# Patient Record
Sex: FEMALE | Race: WHITE | Employment: UNEMPLOYED | URBAN - METROPOLITAN AREA
[De-identification: names, ages, dates, MRNs, and addresses within clinical notes are randomized per-mention and may not be internally consistent; named-entity substitution may affect disease eponyms.]

---

## 2022-05-08 ENCOUNTER — APPOINTMENT (OUTPATIENT)
Dept: GENERAL RADIOLOGY | Age: 57
DRG: 363 | End: 2022-05-08
Payer: MEDICARE

## 2022-05-08 ENCOUNTER — HOSPITAL ENCOUNTER (INPATIENT)
Age: 57
LOS: 10 days | Discharge: HOME | DRG: 363 | End: 2022-05-18
Attending: STUDENT IN AN ORGANIZED HEALTH CARE EDUCATION/TRAINING PROGRAM | Admitting: STUDENT IN AN ORGANIZED HEALTH CARE EDUCATION/TRAINING PROGRAM
Payer: MEDICARE

## 2022-05-08 ENCOUNTER — APPOINTMENT (OUTPATIENT)
Dept: CT IMAGING | Age: 57
DRG: 363 | End: 2022-05-08
Payer: MEDICARE

## 2022-05-08 DIAGNOSIS — I50.9 NEW ONSET OF CONGESTIVE HEART FAILURE (HCC): Primary | ICD-10-CM

## 2022-05-08 DIAGNOSIS — J96.01 ACUTE RESPIRATORY FAILURE WITH HYPOXIA (HCC): ICD-10-CM

## 2022-05-08 DIAGNOSIS — L03.90 CELLULITIS, UNSPECIFIED CELLULITIS SITE: ICD-10-CM

## 2022-05-08 DIAGNOSIS — N63.10 BREAST MASS, RIGHT: ICD-10-CM

## 2022-05-08 PROBLEM — C80.1 MALIGNANCY (HCC): Status: ACTIVE | Noted: 2022-05-08

## 2022-05-08 PROBLEM — R09.89 SUSPECTED CHF (CONGESTIVE HEART FAILURE): Status: ACTIVE | Noted: 2022-05-08

## 2022-05-08 PROBLEM — M79.89 LEG SWELLING: Status: ACTIVE | Noted: 2022-05-08

## 2022-05-08 LAB
A/G RATIO: 1 (ref 1.1–2.2)
ALBUMIN SERPL-MCNC: 3.4 G/DL (ref 3.4–5)
ALP BLD-CCNC: 93 U/L (ref 40–129)
ALT SERPL-CCNC: 6 U/L (ref 10–40)
ANION GAP SERPL CALCULATED.3IONS-SCNC: 11 MMOL/L (ref 3–16)
APTT: 27.1 SEC (ref 26.2–38.6)
AST SERPL-CCNC: 11 U/L (ref 15–37)
BACTERIA: NORMAL /HPF
BASOPHILS ABSOLUTE: 0.1 K/UL (ref 0–0.2)
BASOPHILS RELATIVE PERCENT: 0.7 %
BILIRUB SERPL-MCNC: 1.1 MG/DL (ref 0–1)
BILIRUBIN URINE: NEGATIVE
BLOOD, URINE: NEGATIVE
BUN BLDV-MCNC: 7 MG/DL (ref 7–20)
CALCIUM SERPL-MCNC: 9.2 MG/DL (ref 8.3–10.6)
CHLORIDE BLD-SCNC: 96 MMOL/L (ref 99–110)
CLARITY: ABNORMAL
CO2: 30 MMOL/L (ref 21–32)
COLOR: YELLOW
CREAT SERPL-MCNC: 0.8 MG/DL (ref 0.6–1.1)
EOSINOPHILS ABSOLUTE: 0.1 K/UL (ref 0–0.6)
EOSINOPHILS RELATIVE PERCENT: 0.9 %
EPITHELIAL CELLS, UA: 3 /HPF (ref 0–5)
FERRITIN: 17.2 NG/ML (ref 15–150)
GFR AFRICAN AMERICAN: >60
GFR NON-AFRICAN AMERICAN: >60
GLUCOSE BLD-MCNC: 168 MG/DL (ref 70–99)
GLUCOSE BLD-MCNC: 174 MG/DL (ref 70–99)
GLUCOSE URINE: NEGATIVE MG/DL
HCT VFR BLD CALC: 41 % (ref 36–48)
HCT VFR BLD CALC: 41.3 % (ref 36–48)
HEMOGLOBIN: 12.7 G/DL (ref 12–16)
HYALINE CASTS: 1 /LPF (ref 0–8)
IMMATURE RETIC FRACT: 0.58 (ref 0.21–0.37)
INR BLD: 1.41 (ref 0.88–1.12)
IRON SATURATION: 5 % (ref 15–50)
IRON: 20 UG/DL (ref 37–145)
KETONES, URINE: NEGATIVE MG/DL
LACTIC ACID: 1 MMOL/L (ref 0.4–2)
LACTIC ACID: 1.5 MMOL/L (ref 0.4–2)
LEUKOCYTE ESTERASE, URINE: NEGATIVE
LYMPHOCYTES ABSOLUTE: 0.9 K/UL (ref 1–5.1)
LYMPHOCYTES RELATIVE PERCENT: 11.3 %
MCH RBC QN AUTO: 22.9 PG (ref 26–34)
MCHC RBC AUTO-ENTMCNC: 31.1 G/DL (ref 31–36)
MCV RBC AUTO: 73.7 FL (ref 80–100)
MICROSCOPIC EXAMINATION: YES
MONOCYTES ABSOLUTE: 0.9 K/UL (ref 0–1.3)
MONOCYTES RELATIVE PERCENT: 10.8 %
NEUTROPHILS ABSOLUTE: 6.1 K/UL (ref 1.7–7.7)
NEUTROPHILS RELATIVE PERCENT: 76.3 %
NITRITE, URINE: NEGATIVE
PDW BLD-RTO: 18.8 % (ref 12.4–15.4)
PERFORMED ON: ABNORMAL
PH UA: 5.5 (ref 5–8)
PLATELET # BLD: 300 K/UL (ref 135–450)
PMV BLD AUTO: 7.1 FL (ref 5–10.5)
POTASSIUM REFLEX MAGNESIUM: 3.9 MMOL/L (ref 3.5–5.1)
PRO-BNP: 3604 PG/ML (ref 0–124)
PROTEIN UA: NEGATIVE MG/DL
PROTHROMBIN TIME: 16.2 SEC (ref 9.9–12.7)
RBC # BLD: 5.56 M/UL (ref 4–5.2)
RBC UA: 1 /HPF (ref 0–4)
RETICULOCYTE ABSOLUTE COUNT: 0.18 M/UL (ref 0.02–0.1)
RETICULOCYTE COUNT PCT: 3.26 % (ref 0.5–2.18)
SODIUM BLD-SCNC: 137 MMOL/L (ref 136–145)
SPECIFIC GRAVITY UA: 1 (ref 1–1.03)
TOTAL IRON BINDING CAPACITY: 379 UG/DL (ref 260–445)
TOTAL PROTEIN: 6.9 G/DL (ref 6.4–8.2)
TROPONIN: 0.01 NG/ML
TROPONIN: 0.01 NG/ML
URINE REFLEX TO CULTURE: ABNORMAL
URINE TYPE: ABNORMAL
UROBILINOGEN, URINE: 1 E.U./DL
WBC # BLD: 8 K/UL (ref 4–11)
WBC UA: 1 /HPF (ref 0–5)

## 2022-05-08 PROCEDURE — 85045 AUTOMATED RETICULOCYTE COUNT: CPT

## 2022-05-08 PROCEDURE — 99285 EMERGENCY DEPT VISIT HI MDM: CPT

## 2022-05-08 PROCEDURE — 81001 URINALYSIS AUTO W/SCOPE: CPT

## 2022-05-08 PROCEDURE — 1200000000 HC SEMI PRIVATE

## 2022-05-08 PROCEDURE — 6360000002 HC RX W HCPCS: Performed by: STUDENT IN AN ORGANIZED HEALTH CARE EDUCATION/TRAINING PROGRAM

## 2022-05-08 PROCEDURE — 36415 COLL VENOUS BLD VENIPUNCTURE: CPT

## 2022-05-08 PROCEDURE — 6360000004 HC RX CONTRAST MEDICATION: Performed by: PHYSICIAN ASSISTANT

## 2022-05-08 PROCEDURE — 6360000002 HC RX W HCPCS: Performed by: PHYSICIAN ASSISTANT

## 2022-05-08 PROCEDURE — 83880 ASSAY OF NATRIURETIC PEPTIDE: CPT

## 2022-05-08 PROCEDURE — 2580000003 HC RX 258: Performed by: PHYSICIAN ASSISTANT

## 2022-05-08 PROCEDURE — 83615 LACTATE (LD) (LDH) ENZYME: CPT

## 2022-05-08 PROCEDURE — 96375 TX/PRO/DX INJ NEW DRUG ADDON: CPT

## 2022-05-08 PROCEDURE — 82803 BLOOD GASES ANY COMBINATION: CPT

## 2022-05-08 PROCEDURE — 2500000003 HC RX 250 WO HCPCS: Performed by: STUDENT IN AN ORGANIZED HEALTH CARE EDUCATION/TRAINING PROGRAM

## 2022-05-08 PROCEDURE — 85025 COMPLETE CBC W/AUTO DIFF WBC: CPT

## 2022-05-08 PROCEDURE — 71045 X-RAY EXAM CHEST 1 VIEW: CPT

## 2022-05-08 PROCEDURE — 85730 THROMBOPLASTIN TIME PARTIAL: CPT

## 2022-05-08 PROCEDURE — 83540 ASSAY OF IRON: CPT

## 2022-05-08 PROCEDURE — 93005 ELECTROCARDIOGRAM TRACING: CPT | Performed by: PHYSICIAN ASSISTANT

## 2022-05-08 PROCEDURE — 2700000000 HC OXYGEN THERAPY PER DAY

## 2022-05-08 PROCEDURE — 94761 N-INVAS EAR/PLS OXIMETRY MLT: CPT

## 2022-05-08 PROCEDURE — 82728 ASSAY OF FERRITIN: CPT

## 2022-05-08 PROCEDURE — 36600 WITHDRAWAL OF ARTERIAL BLOOD: CPT

## 2022-05-08 PROCEDURE — 83605 ASSAY OF LACTIC ACID: CPT

## 2022-05-08 PROCEDURE — 83550 IRON BINDING TEST: CPT

## 2022-05-08 PROCEDURE — 6370000000 HC RX 637 (ALT 250 FOR IP): Performed by: PHYSICIAN ASSISTANT

## 2022-05-08 PROCEDURE — 2580000003 HC RX 258: Performed by: STUDENT IN AN ORGANIZED HEALTH CARE EDUCATION/TRAINING PROGRAM

## 2022-05-08 PROCEDURE — 71260 CT THORAX DX C+: CPT

## 2022-05-08 PROCEDURE — 6360000002 HC RX W HCPCS: Performed by: NURSE PRACTITIONER

## 2022-05-08 PROCEDURE — 80053 COMPREHEN METABOLIC PANEL: CPT

## 2022-05-08 PROCEDURE — 96374 THER/PROPH/DIAG INJ IV PUSH: CPT

## 2022-05-08 PROCEDURE — 85610 PROTHROMBIN TIME: CPT

## 2022-05-08 PROCEDURE — 84484 ASSAY OF TROPONIN QUANT: CPT

## 2022-05-08 PROCEDURE — 87040 BLOOD CULTURE FOR BACTERIA: CPT

## 2022-05-08 PROCEDURE — 2060000000 HC ICU INTERMEDIATE R&B

## 2022-05-08 RX ORDER — SODIUM CHLORIDE 0.9 % (FLUSH) 0.9 %
5-40 SYRINGE (ML) INJECTION PRN
Status: DISCONTINUED | OUTPATIENT
Start: 2022-05-08 | End: 2022-05-18 | Stop reason: HOSPADM

## 2022-05-08 RX ORDER — SODIUM CHLORIDE 0.9 % (FLUSH) 0.9 %
5-40 SYRINGE (ML) INJECTION PRN
Status: DISCONTINUED | OUTPATIENT
Start: 2022-05-08 | End: 2022-05-08 | Stop reason: SDUPTHER

## 2022-05-08 RX ORDER — LISINOPRIL 5 MG/1
5 TABLET ORAL DAILY
Status: DISCONTINUED | OUTPATIENT
Start: 2022-05-09 | End: 2022-05-18 | Stop reason: HOSPADM

## 2022-05-08 RX ORDER — FLUCONAZOLE 100 MG/1
200 TABLET ORAL ONCE
Status: COMPLETED | OUTPATIENT
Start: 2022-05-08 | End: 2022-05-08

## 2022-05-08 RX ORDER — ACETAMINOPHEN 325 MG/1
650 TABLET ORAL EVERY 6 HOURS PRN
Status: DISCONTINUED | OUTPATIENT
Start: 2022-05-08 | End: 2022-05-18 | Stop reason: HOSPADM

## 2022-05-08 RX ORDER — FUROSEMIDE 10 MG/ML
40 INJECTION INTRAMUSCULAR; INTRAVENOUS ONCE
Status: COMPLETED | OUTPATIENT
Start: 2022-05-08 | End: 2022-05-08

## 2022-05-08 RX ORDER — NICOTINE 21 MG/24HR
1 PATCH, TRANSDERMAL 24 HOURS TRANSDERMAL DAILY PRN
Status: DISCONTINUED | OUTPATIENT
Start: 2022-05-08 | End: 2022-05-18 | Stop reason: HOSPADM

## 2022-05-08 RX ORDER — SODIUM CHLORIDE 0.9 % (FLUSH) 0.9 %
5-40 SYRINGE (ML) INJECTION EVERY 12 HOURS SCHEDULED
Status: DISCONTINUED | OUTPATIENT
Start: 2022-05-08 | End: 2022-05-08 | Stop reason: SDUPTHER

## 2022-05-08 RX ORDER — SODIUM CHLORIDE 9 MG/ML
INJECTION, SOLUTION INTRAVENOUS PRN
Status: DISCONTINUED | OUTPATIENT
Start: 2022-05-08 | End: 2022-05-08 | Stop reason: SDUPTHER

## 2022-05-08 RX ORDER — METRONIDAZOLE 500 MG/100ML
500 INJECTION, SOLUTION INTRAVENOUS EVERY 8 HOURS
Status: DISCONTINUED | OUTPATIENT
Start: 2022-05-09 | End: 2022-05-09

## 2022-05-08 RX ORDER — SODIUM CHLORIDE 9 MG/ML
INJECTION, SOLUTION INTRAVENOUS PRN
Status: DISCONTINUED | OUTPATIENT
Start: 2022-05-08 | End: 2022-05-18 | Stop reason: HOSPADM

## 2022-05-08 RX ORDER — NALOXONE HYDROCHLORIDE 0.4 MG/ML
0.4 INJECTION, SOLUTION INTRAMUSCULAR; INTRAVENOUS; SUBCUTANEOUS PRN
Status: DISCONTINUED | OUTPATIENT
Start: 2022-05-08 | End: 2022-05-18 | Stop reason: HOSPADM

## 2022-05-08 RX ORDER — FLUCONAZOLE 100 MG/1
200 TABLET ORAL DAILY
Status: DISCONTINUED | OUTPATIENT
Start: 2022-05-09 | End: 2022-05-18 | Stop reason: HOSPADM

## 2022-05-08 RX ORDER — LORAZEPAM 2 MG/ML
2 INJECTION INTRAMUSCULAR
Status: DISCONTINUED | OUTPATIENT
Start: 2022-05-08 | End: 2022-05-08

## 2022-05-08 RX ORDER — LORAZEPAM 1 MG/1
3 TABLET ORAL
Status: DISCONTINUED | OUTPATIENT
Start: 2022-05-08 | End: 2022-05-08

## 2022-05-08 RX ORDER — LORAZEPAM 1 MG/1
2 TABLET ORAL
Status: DISCONTINUED | OUTPATIENT
Start: 2022-05-08 | End: 2022-05-08

## 2022-05-08 RX ORDER — LORAZEPAM 2 MG/ML
4 INJECTION INTRAMUSCULAR
Status: DISCONTINUED | OUTPATIENT
Start: 2022-05-08 | End: 2022-05-08

## 2022-05-08 RX ORDER — LORAZEPAM 2 MG/ML
1 INJECTION INTRAMUSCULAR
Status: DISCONTINUED | OUTPATIENT
Start: 2022-05-08 | End: 2022-05-08

## 2022-05-08 RX ORDER — ONDANSETRON 2 MG/ML
4 INJECTION INTRAMUSCULAR; INTRAVENOUS EVERY 6 HOURS PRN
Status: DISCONTINUED | OUTPATIENT
Start: 2022-05-08 | End: 2022-05-18 | Stop reason: HOSPADM

## 2022-05-08 RX ORDER — LORAZEPAM 1 MG/1
4 TABLET ORAL
Status: DISCONTINUED | OUTPATIENT
Start: 2022-05-08 | End: 2022-05-08

## 2022-05-08 RX ORDER — LORAZEPAM 1 MG/1
1 TABLET ORAL
Status: DISCONTINUED | OUTPATIENT
Start: 2022-05-08 | End: 2022-05-08

## 2022-05-08 RX ORDER — FUROSEMIDE 10 MG/ML
40 INJECTION INTRAMUSCULAR; INTRAVENOUS 2 TIMES DAILY
Status: DISCONTINUED | OUTPATIENT
Start: 2022-05-09 | End: 2022-05-11

## 2022-05-08 RX ORDER — NALOXONE HYDROCHLORIDE 0.4 MG/ML
0.4 INJECTION, SOLUTION INTRAMUSCULAR; INTRAVENOUS; SUBCUTANEOUS ONCE
Status: COMPLETED | OUTPATIENT
Start: 2022-05-09 | End: 2022-05-08

## 2022-05-08 RX ORDER — NALOXONE HYDROCHLORIDE 0.4 MG/ML
0.4 INJECTION, SOLUTION INTRAMUSCULAR; INTRAVENOUS; SUBCUTANEOUS PRN
Status: DISCONTINUED | OUTPATIENT
Start: 2022-05-08 | End: 2022-05-08 | Stop reason: SDUPTHER

## 2022-05-08 RX ORDER — MULTIVITAMIN WITH IRON
1 TABLET ORAL DAILY
Status: DISCONTINUED | OUTPATIENT
Start: 2022-05-09 | End: 2022-05-18 | Stop reason: HOSPADM

## 2022-05-08 RX ORDER — ACETAMINOPHEN 650 MG/1
650 SUPPOSITORY RECTAL EVERY 6 HOURS PRN
Status: DISCONTINUED | OUTPATIENT
Start: 2022-05-08 | End: 2022-05-18 | Stop reason: HOSPADM

## 2022-05-08 RX ORDER — HEPARIN SODIUM 5000 [USP'U]/ML
5000 INJECTION, SOLUTION INTRAVENOUS; SUBCUTANEOUS EVERY 8 HOURS SCHEDULED
Status: DISCONTINUED | OUTPATIENT
Start: 2022-05-08 | End: 2022-05-18 | Stop reason: HOSPADM

## 2022-05-08 RX ORDER — SODIUM CHLORIDE 0.9 % (FLUSH) 0.9 %
5-40 SYRINGE (ML) INJECTION EVERY 12 HOURS SCHEDULED
Status: DISCONTINUED | OUTPATIENT
Start: 2022-05-08 | End: 2022-05-18 | Stop reason: HOSPADM

## 2022-05-08 RX ORDER — LORAZEPAM 2 MG/ML
3 INJECTION INTRAMUSCULAR
Status: DISCONTINUED | OUTPATIENT
Start: 2022-05-08 | End: 2022-05-08

## 2022-05-08 RX ORDER — GAUZE BANDAGE 2" X 2"
100 BANDAGE TOPICAL DAILY
Status: DISCONTINUED | OUTPATIENT
Start: 2022-05-09 | End: 2022-05-18 | Stop reason: HOSPADM

## 2022-05-08 RX ADMIN — FUROSEMIDE 40 MG: 10 INJECTION, SOLUTION INTRAMUSCULAR; INTRAVENOUS at 18:35

## 2022-05-08 RX ADMIN — Medication 1500 MG: at 20:29

## 2022-05-08 RX ADMIN — CEFEPIME HYDROCHLORIDE 2000 MG: 2 INJECTION, POWDER, FOR SOLUTION INTRAVENOUS at 19:45

## 2022-05-08 RX ADMIN — METRONIDAZOLE 500 MG: 500 INJECTION, SOLUTION INTRAVENOUS at 23:21

## 2022-05-08 RX ADMIN — NALOXONE HYDROCHLORIDE 0.4 MG: 0.4 INJECTION, SOLUTION INTRAMUSCULAR; INTRAVENOUS; SUBCUTANEOUS at 23:44

## 2022-05-08 RX ADMIN — HEPARIN SODIUM 5000 UNITS: 5000 INJECTION INTRAVENOUS; SUBCUTANEOUS at 23:18

## 2022-05-08 RX ADMIN — FLUCONAZOLE 200 MG: 100 TABLET ORAL at 20:30

## 2022-05-08 RX ADMIN — IOPAMIDOL 75 ML: 755 INJECTION, SOLUTION INTRAVENOUS at 21:13

## 2022-05-08 RX ADMIN — Medication 10 ML: at 23:17

## 2022-05-08 ASSESSMENT — LIFESTYLE VARIABLES
HOW MANY STANDARD DRINKS CONTAINING ALCOHOL DO YOU HAVE ON A TYPICAL DAY: 3 OR 4
HOW OFTEN DO YOU HAVE A DRINK CONTAINING ALCOHOL: 4 OR MORE TIMES A WEEK

## 2022-05-08 NOTE — ED NOTES
Pt to room 38. Pt will not allow RN to put pt on monitor until after she uses the bathroom. When pt back to room, 02 sat 75% on RA. Pt immediately placed on 4L NC and now on 92%. Tech and Medic at bedside for EKG and Labs. PA aware.       David Rivas RN  05/08/22 7822

## 2022-05-08 NOTE — ED PROVIDER NOTES
[address]  [address] [address]  Dept: [phone]  Loc: [phone]  eMERGENCYdEPARTMENT eNCOUnter      Pt Name: Jacob Boyer  MRN: [id number]  Deneen 1965  Date of evaluation: 5/8/2022  Provider:Arianne Harmon PA-C    CHIEF COMPLAINT       Chief Complaint   Patient presents with    Leg Swelling     bilat on and off x 1 year, pt states \"I ate a lot of salt lately\"       CRITICAL CARE TIME   Total Critical Care time was 32 minutes, excluding separately reportable procedures. There was a high probability of clinically significant/life threatening deterioration in the patient's condition which required my urgentintervention. HISTORY OF PRESENT ILLNESS  (Location/Symptom, Timing/Onset, Context/Setting, Quality, Duration,Modifying Factors, Severity.)   Jacob Boyer is a 55 y.o. female who presents to the emergency department by private vehicle complaining of lower extremity swelling. Patient states for the past year she has had intermittent bilateral lower extremity swelling. States swelling has been worse over the past couple weeks. Patient states \"I ate a lot of salt recently\", believes this is the cause. She also complaining of wound on her right shin. She cut herself shaving about a week ago. Wound is still open and she has developed blisters around region. Over the past couple days she has developed shortness of breath. States she becomes easily winded when overexcited, talking too long or exerting herself. Patient sleeps elevated on pillows, denies any increase in elevation. She has had a mild occasional cough, denies hemoptysis or production. Denies any chest pain, lightheadedness, fevers, chills, palpitations. Additionally, patient requesting us to look at a mass on her right breast.  Patient states she developed to firm nodules to her right breast about a year ago.   Mass has gradually enlarged over time and bleeds daily. She has not been seen or evaluated for this. She is also complaining of redness malodorous drainage beneath left breast.  Patient states when this occurs she typically place Vaseline. She is been applying Vaseline with no relief. Malodor began over the past couple days. Nursing Notes were reviewedand agreed with or any disagreements were addressed in the HPI. REVIEW OF SYSTEMS    (2-9 systems for level 4, 10 or more for level 5)     Review of Systems   Constitutional: Negative for chills and fever. HENT: Negative for congestion. Eyes: Negative. Respiratory: Positive for shortness of breath. Cardiovascular: Positive for leg swelling. Negative for chest pain. Gastrointestinal: Negative for abdominal pain, nausea and vomiting. Genitourinary: Negative. Musculoskeletal: Negative for arthralgias and myalgias. Skin: Positive for wound. Neurological: Negative. Psychiatric/Behavioral: Negative for behavioral problems and confusion. Except as noted above the remainder of the review of systems was reviewed and negative. PAST MEDICAL HISTORY   No past medical history on file. SURGICAL HISTORY     No past surgical history on file. CURRENT MEDICATIONS     [unfilled]    ALLERGIES     Patient has no known allergies. FAMILY HISTORY     No family history on file. No family status information on file. SOCIAL HISTORY          PHYSICAL EXAM    (up to 7 for level 4, 8 or more for level 5)     ED Triage Vitals [05/08/22 1539]   Enc Vitals Group      BP (!) 193/101      Pulse 108      Resp 21      Temp 98.2 °F (36.8 °C)      Temp Source Oral      SpO2 (!) 87 %      Weight 263 lb 7.2 oz (119.5 kg)      Height 5' 6\" (1.676 m)      Head Circumference       Peak Flow       Pain Score       Pain Loc       Pain Edu? Excl. in [address]? Physical Exam  HENT:      Head: Normocephalic and atraumatic.    Cardiovascular:      Rate and Rhythm: Regular rhythm. Tachycardia present. Pulmonary:      Breath sounds: Rhonchi present. Comments: Conversational tachypnea, coarse breath sounds audible throughout  Abdominal:      Palpations: Abdomen is soft. Tenderness: There is no abdominal tenderness. Comments: Abdominal wall induration present   Musculoskeletal:         General: Normal range of motion. Cervical back: Normal range of motion and neck supple. Comments: Bilateral lower extremity pitting edema, equal bilaterally,   Skin:     General: Skin is warm. Comments: 1. Wound with surrounding erythema to right lower extremity as noted in image below   2. beneath left breast erythema with satellite lesions, breast tissue indurated in associated region, malodorous  3. Right breast mass as noted in image below   Neurological:      General: No focal deficit present. Mental Status: She is alert and oriented to person, place, and time. Psychiatric:         Mood and Affect: Mood normal.         Behavior: Behavior normal.                            DIAGNOSTIC RESULTS     EKG: All EKG's are interpreted by the Emergency Department Physician who either signs or Co-signs this chart in the absence of a cardiologist.    RADIOLOGY:   Non-plain film images such as CT, Ultrasound and MRI are read by the radiologist. Plain radiographic images are visualized and preliminarilyinterpreted by the emergency physician with the below findings:    Interpretation per the Radiologist below,if available at the time of this note:    XR CHEST PORTABLE   Final Result   Pulmonary vascular congestion/interstitial edema. Cardiomegaly.          CT CHEST ABDOMEN PELVIS W CONTRAST    (Results Pending)         LABS:  Labs Reviewed   CBC WITH AUTO DIFFERENTIAL - Abnormal; Notable for the following components:       Result Value    RBC 5.56 (*)     MCV 73.7 (*)     MCH 22.9 (*)     RDW 18.8 (*)     Lymphocytes Absolute 0.9 (*)     All other components within normal limits   COMPREHENSIVE METABOLIC PANEL W/ REFLEX TO MG FOR LOW K - Abnormal; Notable for the following components:    Chloride 96 (*)     Glucose 168 (*)     Albumin/Globulin Ratio 1.0 (*)     Total Bilirubin 1.1 (*)     ALT 6 (*)     AST 11 (*)     All other components within normal limits   URINALYSIS WITH REFLEX TO CULTURE - Abnormal; Notable for the following components:    Clarity, UA CLOUDY (*)     All other components within normal limits   BRAIN NATRIURETIC PEPTIDE - Abnormal; Notable for the following components:    Pro-BNP 3,604 (*)     All other components within normal limits   CULTURE, BLOOD 1   CULTURE, BLOOD 2   TROPONIN   LACTIC ACID   MICROSCOPIC URINALYSIS   IRON AND TIBC   FERRITIN   LACTATE DEHYDROGENASE   RETICULOCYTES   APTT   PROTIME-INR   TROPONIN       All other labs were within normal range or not returned as of this dictation. EMERGENCY DEPARTMENT COURSE and DIFFERENTIAL DIAGNOSIS/MDM:   Vitals:    Vitals:    05/08/22 1539 05/08/22 1707 05/08/22 1711 05/08/22 1715   BP: (!) 193/101 138/80 138/80 (!) 155/78   Pulse: 108 95 104 106   Resp: 21 29 16 19   Temp: 98.2 °F (36.8 °C)      TempSrc: Oral      SpO2: (!) 87% 93% 95% 95%   Weight: 263 lb 7.2 oz (119.5 kg)      Height: 5' 6\" (1.676 m)          MDM     Patient presents ED with HPI noted above. Patient hypoxic with oxygen saturation of 75% on room air after transitioning from bathroom to bed when being roomed. She was placed on supplemental oxygen. Patient found to have new onset CHF. Chest x-ray showed pulmonary vascular congestion/interstitial edema and cardiomegaly. BNP elevated at 3604. Rhonchi audible on exam.  Patient with bilateral lower extremity and abdominal wall edema. EKG showed no acute ischemic pattern. Troponin 0.01. Patient with yeast infection beneath left breast with concerns for secondary infection. Skin around wound in right lower extremity with increased erythema.   Patient covered with antibiotics. Lactic normal.    Right breast mass will need addressed. Consultation made to hospitalist regarding admission. Patient seen independently with my attending available for consultation as needed. CONSULTS:  IP CONSULT TO HEART FAILURE NURSE/COORDINATOR  IP CONSULT TO DIETITIAN  IP CONSULT TO INFECTIOUS DISEASES  PHARMACY TO DOSE VANCOMYCIN  IP CONSULT TO GENERAL SURGERY  IP CONSULT TO OB GYN  IP CONSULT TO ONCOLOGY  IP CONSULT TO CARDIOLOGY  IP CONSULT TO SOCIAL WORK    PROCEDURES:  Procedures    FINAL IMPRESSION      1. New onset of congestive heart failure ([address]    2. Breast mass, right    3. Cellulitis, unspecified cellulitis site    4. Acute respiratory failure with hypoxia (HCC)          DISPOSITION/PLAN   [unfilled]    PATIENT REFERRED TO:  No follow-up provider specified.     DISCHARGE MEDICATIONS:  New Prescriptions    No medications on file       (Please note that portions of this note were completed with a voice recognition program.  Efforts were made to edit the dictations but occasionally words are mis-transcribed.)    MYRA Hilario, [place]  05/08/22 [facility], MYRA  05/08/22 [address] [place] [address], MYRA  05/15/22 [facility]

## 2022-05-08 NOTE — ED PROVIDER NOTES
The Ekg interpreted by me shows  sinus tachycardia, uffs=794   Axis is   Right axis deviation  QTc is  474 msec  Intervals and Durations are unremarkable.  Low voltage  ST Segments: nonspecific changes  No prior EKG available for comparison           Jaswant Almaguer MD  05/10/22 1168

## 2022-05-08 NOTE — PROGRESS NOTES
Patient denies the use of any prescription medication, OTC, or herbal supplements.     Gloria Martinez, Pharmacy Intern  5/8/2022  5:00 PM

## 2022-05-08 NOTE — H&P
Hospital Medicine History & Physical      PCP: No primary care provider on file. Date of Admission: 5/8/2022    Date of Service: Pt seen/examined on 5/8/2022 and admitted to inpatient    Chief Complaint: Leg swelling, shortness of breath, breast swelling and lesion      History Of Present Illness: The patient is a 55 y.o. female with no major past medical history although patient does not see a doctor in avoids medical evaluation other than coming in today but from what can be understood patient does have a significant smoking abuse history of smoking about 1 pack/day for at least a few decades as well as alcohol abuse history with at least a few beers if not upwards of 6 of them daily but has never had alcohol withdrawal before who presents to [facility] with main concern of progressively worsening shortness of breath in the last few days and especially in the last 24 hours mainly on exertion. However, it is noted that patient has had significant intermittent leg swelling and some component of possible abdominal bloating and swelling only on for least the last few months. Patient is somewhat in denial from what I can tell and also had difficulty telling me about her significant symptoms of breast swelling and she noted that the breast lesion that is currently present that is very fungating in appearance and very concerning at this time started as a marble sized area on her breast about 4 years ago and has been continuing to worsen.   Per the  and son, patient has been in denial and has been putting off coming for further evaluation of the breast lesions but also that the  feels as though patient's worsening shortness of breath has been going on much longer than the last few days and potentially is  For least a few months with leg swelling potentially being the same timeframe although I suspect that this is both been getting worse for any longer number of time. Apart from the symptoms of the breast swelling and lesions as well as leg swelling and shortness of breath on exertion, patient denies other recent symptoms of fever, chills, dizziness, syncope, chest pain, dysuria, blood in urine/stool/sputum, nausea/vomiting/diarrhea/abdominal pain. Past Medical History:    History reviewed. No pertinent past medical history. Past Surgical History:    History reviewed. No pertinent surgical history. Medications Prior to Admission:    Prior to Admission medications    Not on File       Allergies:  Patient has no known allergies. Social History:  The patient currently lives home    TOBACCO:   reports that she has been smoking. She has a 64.50 pack-year smoking history. She has never used smokeless tobacco.  ETOH:   reports current alcohol use of about 1.0 - 3.0 standard drink of alcohol per week. Drug use: Denied drug use    Family History:  Reviewed in detail and negative for DM, Early CAD, Cancer, CVA. Positive as follows:    History reviewed. No pertinent family history. REVIEW OF SYSTEMS:   as noted in the HPI. All other systems reviewed and negative. PHYSICAL EXAM:    /73   Pulse 95   Temp 98.4 °F (36.9 °C) (Oral)   Resp 23   Ht 5' 6\" (1.676 m)   Wt 256 lb 9.9 oz (116.4 kg)   SpO2 96%   BMI 41.42 kg/m²     General appearance: Chronically ill-appearing, fatigued appearing, on nasal cannula oxygen at this time, seems alert and oriented at this time in the ED  HEENT Normal cephalic, atraumatic without obvious deformity. Pupils equal, round, and reactive to light. Extra ocular muscles intact. Moist mucous membranes, anicteric sclera  Neck: Supple, no JVD.   Lungs: Diminished breath sounds bilaterally, likely this is due to body habitus, feel as though patient has significantly more crackles right posterior base compared to left but both are present, no wheezing noted  Heart: Slightly tachycardic intermittently but regular rhythm  Abdomen: Noted body wall anasarca and significant swelling, somewhat firm abdomen but not rigid, active bowel sounds although difficult to tell entirely  Extremities: 2+ bilateral lower extremity pitting edema, there is some increased erythema to the right lower extremity that is more significant compared to left there is no drainage or focal area of fluctuance to suggest abscess  Skin: Right and left lower extremity compared as above, skin to the bilateral breasts are noted: Right breast demonstrates a very large fungating impressive mass lesion that is tender and has some layering areas of blood at the 11 o'clock position of the breast and it does also seem to have some seeping area that looks potentially like pus around the area of the breast-it is very tender to palpation and underneath both breast there is area to suggest possible yeast infection and erythema. The left and right breast both have significant induration to both areas and even around the area of the right breast mass lesion that is described earlier. Neurologic: Currently in the ED patient is grossly intact neurologically and alert and oriented with family at bedside, she has 5 of 5 strength all extremities  Mental status: Alert, oriented, thought content appropriate. Capillary Refill: Acceptable  < 3 seconds  Peripheral Pulses: +3 Easily felt, not easily obliterated with pressure      CT chest abdomen and pelvis IV contrast:  1.  CT CHEST: Mild congestive heart failure. 2. Nonspecific main pulmonary artery dilatation can be seen with pulmonary   hypertension. 3. Mild atelectasis bilateral lower lungs and small volume right pleural   effusion.  Pneumonia is a consideration. 4. Large fungating right breast mass with diffuse right breast skin   thickening almost certainly primary breast cancer.    5. Bilateral axillary lymph node enlargement may be reactive or reflect   metastatic disease. 6. Anasarca. 7. CT ABDOMEN/PELVIS: No acute abnormality. 8. Nonspecific left adrenal 1.5 cm nodule.  Follow-up noncontrast CT abdomen   no sooner than 48 hours from contrast administration recommended. 9. Nonspecific mild adenopathy right upper quadrant common with hepatic   steatosis, probably reactive. 10. Nonspecific splenomegaly. 11. Hepatic steatosis and hepatomegaly. 12. Anasarca.          CBC   Recent Labs     05/08/22 1645 05/09/22  0609   WBC 8.0 10.8   HGB 12.7 12.4   HCT 41.3  41.0 39.8    300      RENAL  Recent Labs     05/08/22 1645 05/09/22  0609    140   K 3.9 3.6   CL 96* 99   CO2 30 29   BUN 7 6*   CREATININE 0.8 0.8     LFT'S  Recent Labs     05/08/22 1645   AST 11*   ALT 6*   BILITOT 1.1*   ALKPHOS 93     COAG  Recent Labs     05/08/22  2148   INR 1.41*     CARDIAC ENZYMES  Recent Labs     05/08/22 1645 05/08/22  2148   TROPONINI 0.01 0.01       U/A:    Lab Results   Component Value Date    COLORU Yellow 05/08/2022    WBCUA 1 05/08/2022    RBCUA 1 05/08/2022    BACTERIA None Seen 05/08/2022    CLARITYU CLOUDY 05/08/2022    SPECGRAV 1.005 05/08/2022    LEUKOCYTESUR Negative 05/08/2022    BLOODU Negative 05/08/2022    GLUCOSEU Negative 05/08/2022       ABG    Lab Results   Component Value Date    XYT0OCF 38.7 05/08/2022    BEART 9.2 05/08/2022    L3MNMDEC 92.7 05/08/2022    PHART 7.295 05/08/2022    WTS7LHZ 79.6 05/08/2022    PO2ART 72.5 05/08/2022    PCZ3LCW 41.1 05/08/2022           Active Hospital Problems    Diagnosis Date Noted    Suspected CHF (congestive heart failure) [R09.89] 05/08/2022     Priority: Medium    Malignancy ([address] [C80.1] 05/08/2022     Priority: Medium    Leg swelling [M79.89] 05/08/2022     Priority: Medium    Cellulitis [L03.90] 05/08/2022     Priority: Medium   · Acute respiratory failure with hypoxia and hypercarbia  · Smoking abuse      PHYSICIANS CERTIFICATION:    I certify that Brad Anguiano is expected to be hospitalized for greater than 2 midnights based on the following assessment and plan:      ASSESSMENT/PLAN:  · Suspected congestive heart failure  · Acute stroke with hypoxia and hypercarbia  · Leg swelling  · Cellulitis  · Pneumonia  · Malignancy  · Smoking abuse  · Altered mental status    Plan:  · Patient started on broad-spectrum antibiotics for some component of infectious process: Cellulitis versus pneumonia versus both. Suspect some component of congestive heart failure given patient's body wall anasarca and pleural effusions  · Patient started on vancomycin/cefepime/Flagyl and fluconazole, started on miconazole powder for breast cellulitis  · Patient started on lisinopril for the morning, started on Lasix 40 twice daily IV and got first dose of Lasix in the ED  · Patient did have a rapid response episode later and was significantly obtunded, noted to be hypercarbic, started on BiPAP  · Initially placed patient on CIWA protocol with Ativan but patient never got Ativan and due to her obtunded nature decided to stop the Ativan  · May need to consider steroid therapy later if hypercarbia continues, holding steroid therapy if patient does need biopsy evaluation of malignancy  · Consult to infectious disease  · Consult to cardiology  · Consult oncology  · Consult to general surgery  · Consult to OB/GYN  · Pulmonary consult  · Started on nicotine replacement therapy with nicotine patch  · Order transthoracic echo for the morning  · Repeat labs daily      DVT Prophylaxis: Heparin  Diet: ADULT DIET; Regular; 4 carb choices (60 gm/meal); Low Sodium (2 gm); 1500 ml  Code Status: Full Code  PT/OT Eval Status: Ambulatory    Dispo -pending clinical course       Samy Aquino, DO    Thank you No primary care provider on file. for the opportunity to be involved in this patient's care. If you have any questions or concerns please feel free to contact me at [phone].

## 2022-05-09 LAB
AMPHETAMINE SCREEN, URINE: NORMAL
ANION GAP SERPL CALCULATED.3IONS-SCNC: 12 MMOL/L (ref 3–16)
BARBITURATE SCREEN URINE: NORMAL
BASE EXCESS ARTERIAL: 9.2 MMOL/L (ref -3–3)
BASE EXCESS VENOUS: 10.1 MMOL/L
BASOPHILS ABSOLUTE: 0.1 K/UL (ref 0–0.2)
BASOPHILS RELATIVE PERCENT: 0.6 %
BENZODIAZEPINE SCREEN, URINE: NORMAL
BUN BLDV-MCNC: 6 MG/DL (ref 7–20)
CALCIUM SERPL-MCNC: 8.9 MG/DL (ref 8.3–10.6)
CANNABINOID SCREEN URINE: NORMAL
CARBOXYHEMOGLOBIN ARTERIAL: 6.2 % (ref 0–1.5)
CARBOXYHEMOGLOBIN: 5.4 %
CHLORIDE BLD-SCNC: 99 MMOL/L (ref 99–110)
CHOLESTEROL, TOTAL: 111 MG/DL (ref 0–199)
CO2: 29 MMOL/L (ref 21–32)
COCAINE METABOLITE SCREEN URINE: NORMAL
CREAT SERPL-MCNC: 0.8 MG/DL (ref 0.6–1.1)
EKG ATRIAL RATE: 108 BPM
EKG DIAGNOSIS: NORMAL
EKG P AXIS: 67 DEGREES
EKG P-R INTERVAL: 158 MS
EKG Q-T INTERVAL: 354 MS
EKG QRS DURATION: 76 MS
EKG QTC CALCULATION (BAZETT): 474 MS
EKG R AXIS: 150 DEGREES
EKG T AXIS: 6 DEGREES
EKG VENTRICULAR RATE: 108 BPM
EOSINOPHILS ABSOLUTE: 0.1 K/UL (ref 0–0.6)
EOSINOPHILS RELATIVE PERCENT: 0.6 %
ESTIMATED AVERAGE GLUCOSE: 154.2 MG/DL
GFR AFRICAN AMERICAN: >60
GFR NON-AFRICAN AMERICAN: >60
GLUCOSE BLD-MCNC: 144 MG/DL (ref 70–99)
HBA1C MFR BLD: 7 %
HCO3 ARTERIAL: 38.7 MMOL/L (ref 21–29)
HCO3 VENOUS: 39 MMOL/L (ref 23–29)
HCT VFR BLD CALC: 39.8 % (ref 36–48)
HDLC SERPL-MCNC: 26 MG/DL (ref 40–60)
HEMOGLOBIN, ART, EXTENDED: 12.6 G/DL (ref 12–16)
HEMOGLOBIN: 12.4 G/DL (ref 12–16)
LACTATE DEHYDROGENASE: 248 U/L (ref 100–190)
LDL CHOLESTEROL CALCULATED: 67 MG/DL
LYMPHOCYTES ABSOLUTE: 0.8 K/UL (ref 1–5.1)
LYMPHOCYTES RELATIVE PERCENT: 7.5 %
Lab: NORMAL
MAGNESIUM: 1.5 MG/DL (ref 1.8–2.4)
MCH RBC QN AUTO: 23 PG (ref 26–34)
MCHC RBC AUTO-ENTMCNC: 31 G/DL (ref 31–36)
MCV RBC AUTO: 74.2 FL (ref 80–100)
METHADONE SCREEN, URINE: NORMAL
METHEMOGLOBIN ARTERIAL: 0.2 %
METHEMOGLOBIN VENOUS: 0.6 %
MONOCYTES ABSOLUTE: 1.1 K/UL (ref 0–1.3)
MONOCYTES RELATIVE PERCENT: 10.5 %
NEUTROPHILS ABSOLUTE: 8.7 K/UL (ref 1.7–7.7)
NEUTROPHILS RELATIVE PERCENT: 80.8 %
O2 SAT, ARTERIAL: 92.7 %
O2 SAT, VEN: 93 %
O2 THERAPY: ABNORMAL
O2 THERAPY: ABNORMAL
OPIATE SCREEN URINE: NORMAL
OXYCODONE URINE: NORMAL
PCO2 ARTERIAL: 79.6 MMHG (ref 35–45)
PCO2, VEN: 76.3 MMHG (ref 40–50)
PDW BLD-RTO: 19.1 % (ref 12.4–15.4)
PH ARTERIAL: 7.29 (ref 7.35–7.45)
PH UA: 5
PH VENOUS: 7.32 (ref 7.35–7.45)
PHENCYCLIDINE SCREEN URINE: NORMAL
PLATELET # BLD: 300 K/UL (ref 135–450)
PMV BLD AUTO: 7.4 FL (ref 5–10.5)
PO2 ARTERIAL: 72.5 MMHG (ref 75–108)
PO2, VEN: 73 MMHG
POTASSIUM SERPL-SCNC: 3.6 MMOL/L (ref 3.5–5.1)
PROCALCITONIN: 0.11 NG/ML (ref 0–0.15)
PROPOXYPHENE SCREEN: NORMAL
RBC # BLD: 5.37 M/UL (ref 4–5.2)
SODIUM BLD-SCNC: 140 MMOL/L (ref 136–145)
TCO2 ARTERIAL: 41.1 MMOL/L
TCO2 CALC VENOUS: 41 MMOL/L
TRIGL SERPL-MCNC: 90 MG/DL (ref 0–150)
VLDLC SERPL CALC-MCNC: 18 MG/DL
WBC # BLD: 10.8 K/UL (ref 4–11)

## 2022-05-09 PROCEDURE — 83036 HEMOGLOBIN GLYCOSYLATED A1C: CPT

## 2022-05-09 PROCEDURE — 80048 BASIC METABOLIC PNL TOTAL CA: CPT

## 2022-05-09 PROCEDURE — 87070 CULTURE OTHR SPECIMN AEROBIC: CPT

## 2022-05-09 PROCEDURE — 2060000000 HC ICU INTERMEDIATE R&B

## 2022-05-09 PROCEDURE — 85025 COMPLETE CBC W/AUTO DIFF WBC: CPT

## 2022-05-09 PROCEDURE — 84145 PROCALCITONIN (PCT): CPT

## 2022-05-09 PROCEDURE — 83735 ASSAY OF MAGNESIUM: CPT

## 2022-05-09 PROCEDURE — 2500000003 HC RX 250 WO HCPCS: Performed by: STUDENT IN AN ORGANIZED HEALTH CARE EDUCATION/TRAINING PROGRAM

## 2022-05-09 PROCEDURE — 6370000000 HC RX 637 (ALT 250 FOR IP): Performed by: INTERNAL MEDICINE

## 2022-05-09 PROCEDURE — 6360000002 HC RX W HCPCS: Performed by: STUDENT IN AN ORGANIZED HEALTH CARE EDUCATION/TRAINING PROGRAM

## 2022-05-09 PROCEDURE — 87205 SMEAR GRAM STAIN: CPT

## 2022-05-09 PROCEDURE — 0HBT0ZX EXCISION OF RIGHT BREAST, OPEN APPROACH, DIAGNOSTIC: ICD-10-PCS | Performed by: SURGERY

## 2022-05-09 PROCEDURE — 80061 LIPID PANEL: CPT

## 2022-05-09 PROCEDURE — 93010 ELECTROCARDIOGRAM REPORT: CPT | Performed by: INTERNAL MEDICINE

## 2022-05-09 PROCEDURE — 93970 EXTREMITY STUDY: CPT

## 2022-05-09 PROCEDURE — 94660 CPAP INITIATION&MGMT: CPT

## 2022-05-09 PROCEDURE — 94761 N-INVAS EAR/PLS OXIMETRY MLT: CPT

## 2022-05-09 PROCEDURE — 88341 IMHCHEM/IMCYTCHM EA ADD ANTB: CPT

## 2022-05-09 PROCEDURE — 97530 THERAPEUTIC ACTIVITIES: CPT

## 2022-05-09 PROCEDURE — 88342 IMHCHEM/IMCYTCHM 1ST ANTB: CPT

## 2022-05-09 PROCEDURE — 36415 COLL VENOUS BLD VENIPUNCTURE: CPT

## 2022-05-09 PROCEDURE — 2580000003 HC RX 258: Performed by: STUDENT IN AN ORGANIZED HEALTH CARE EDUCATION/TRAINING PROGRAM

## 2022-05-09 PROCEDURE — 80307 DRUG TEST PRSMV CHEM ANLYZR: CPT

## 2022-05-09 PROCEDURE — 6360000002 HC RX W HCPCS: Performed by: INTERNAL MEDICINE

## 2022-05-09 PROCEDURE — 99223 1ST HOSP IP/OBS HIGH 75: CPT | Performed by: INTERNAL MEDICINE

## 2022-05-09 PROCEDURE — 6370000000 HC RX 637 (ALT 250 FOR IP): Performed by: STUDENT IN AN ORGANIZED HEALTH CARE EDUCATION/TRAINING PROGRAM

## 2022-05-09 PROCEDURE — 97161 PT EVAL LOW COMPLEX 20 MIN: CPT

## 2022-05-09 PROCEDURE — 88305 TISSUE EXAM BY PATHOLOGIST: CPT

## 2022-05-09 PROCEDURE — APPNB45 APP NON BILLABLE 31-45 MINUTES: Performed by: PHYSICIAN ASSISTANT

## 2022-05-09 PROCEDURE — 87641 MR-STAPH DNA AMP PROBE: CPT

## 2022-05-09 PROCEDURE — 82803 BLOOD GASES ANY COMBINATION: CPT

## 2022-05-09 PROCEDURE — 19120 REMOVAL OF BREAST LESION: CPT | Performed by: SURGERY

## 2022-05-09 PROCEDURE — 2700000000 HC OXYGEN THERAPY PER DAY

## 2022-05-09 RX ORDER — NALOXONE HYDROCHLORIDE 1 MG/ML
2 INJECTION INTRAMUSCULAR; INTRAVENOUS; SUBCUTANEOUS PRN
Status: DISCONTINUED | OUTPATIENT
Start: 2022-05-09 | End: 2022-05-18 | Stop reason: HOSPADM

## 2022-05-09 RX ORDER — ATORVASTATIN CALCIUM 10 MG/1
10 TABLET, FILM COATED ORAL DAILY
Status: DISCONTINUED | OUTPATIENT
Start: 2022-05-10 | End: 2022-05-18 | Stop reason: HOSPADM

## 2022-05-09 RX ORDER — MAGNESIUM SULFATE IN WATER 40 MG/ML
4000 INJECTION, SOLUTION INTRAVENOUS ONCE
Status: COMPLETED | OUTPATIENT
Start: 2022-05-09 | End: 2022-05-09

## 2022-05-09 RX ORDER — MAGNESIUM SULFATE 1 G/100ML
1000 INJECTION INTRAVENOUS PRN
Status: DISCONTINUED | OUTPATIENT
Start: 2022-05-09 | End: 2022-05-09

## 2022-05-09 RX ORDER — LIDOCAINE HYDROCHLORIDE AND EPINEPHRINE 10; 10 MG/ML; UG/ML
20 INJECTION, SOLUTION INFILTRATION; PERINEURAL ONCE
Status: DISCONTINUED | OUTPATIENT
Start: 2022-05-09 | End: 2022-05-18 | Stop reason: HOSPADM

## 2022-05-09 RX ORDER — METRONIDAZOLE 500 MG/1
500 TABLET ORAL EVERY 8 HOURS SCHEDULED
Status: DISCONTINUED | OUTPATIENT
Start: 2022-05-09 | End: 2022-05-13

## 2022-05-09 RX ADMIN — VANCOMYCIN HYDROCHLORIDE 1000 MG: 1 INJECTION, POWDER, LYOPHILIZED, FOR SOLUTION INTRAVENOUS at 21:22

## 2022-05-09 RX ADMIN — HEPARIN SODIUM 5000 UNITS: 5000 INJECTION INTRAVENOUS; SUBCUTANEOUS at 13:25

## 2022-05-09 RX ADMIN — NALOXONE HYDROCHLORIDE 2 MG: 1 INJECTION PARENTERAL at 00:18

## 2022-05-09 RX ADMIN — METRONIDAZOLE 500 MG: 500 INJECTION, SOLUTION INTRAVENOUS at 08:14

## 2022-05-09 RX ADMIN — THIAMINE HCL TAB 100 MG 100 MG: 100 TAB at 10:38

## 2022-05-09 RX ADMIN — HEPARIN SODIUM 5000 UNITS: 5000 INJECTION INTRAVENOUS; SUBCUTANEOUS at 05:58

## 2022-05-09 RX ADMIN — METRONIDAZOLE 500 MG: 500 TABLET ORAL at 13:25

## 2022-05-09 RX ADMIN — FLUCONAZOLE 200 MG: 100 TABLET ORAL at 20:35

## 2022-05-09 RX ADMIN — HEPARIN SODIUM 5000 UNITS: 5000 INJECTION INTRAVENOUS; SUBCUTANEOUS at 21:18

## 2022-05-09 RX ADMIN — CEFEPIME HYDROCHLORIDE 2000 MG: 2 INJECTION, POWDER, FOR SOLUTION INTRAVENOUS at 10:44

## 2022-05-09 RX ADMIN — CEFEPIME HYDROCHLORIDE 2000 MG: 2 INJECTION, POWDER, FOR SOLUTION INTRAVENOUS at 20:34

## 2022-05-09 RX ADMIN — THERA TABS 1 TABLET: TAB at 10:38

## 2022-05-09 RX ADMIN — MICONAZOLE NITRATE: 2 POWDER TOPICAL at 20:38

## 2022-05-09 RX ADMIN — MAGNESIUM SULFATE HEPTAHYDRATE 4000 MG: 40 INJECTION, SOLUTION INTRAVENOUS at 13:40

## 2022-05-09 RX ADMIN — VANCOMYCIN HYDROCHLORIDE 1000 MG: 1 INJECTION, POWDER, LYOPHILIZED, FOR SOLUTION INTRAVENOUS at 11:28

## 2022-05-09 RX ADMIN — METRONIDAZOLE 500 MG: 500 TABLET ORAL at 21:18

## 2022-05-09 ASSESSMENT — ENCOUNTER SYMPTOMS
SHORTNESS OF BREATH: 1
GASTROINTESTINAL NEGATIVE: 1
EYES NEGATIVE: 1

## 2022-05-09 NOTE — PROCEDURES
Attempted to perform complete echo @ 9:45am, however, patient could not tolerate probe pressure under the left breast due to pain. Mentions skin abrasions causing tenderness. Patient would like to try again when tenderness and pain have subsided.

## 2022-05-09 NOTE — PROGRESS NOTES
Upon entering pt's room, she was only arousal to painful stimuli (sternal rub). Pt will open her eyes for a second but will not answer orientation questions. Provider notified at this time. VSS    Provider came to bedside to assess pt. ABG and UDS ordered. Ordered narcan x2 doses as well. This RN administered them with provider still at bedside. Pt began to become more arousal to verbal stimuli but drifting. After 2mg of narcan was given, pt now alert and oriented x 4. VSS. Pt placed on bipap at this time.

## 2022-05-09 NOTE — CONSULTS
General Surgery Consult Note      [place]   : 1965 MRN: [id number]  Date of Admission: 2022  Admitting [de-identified] Kt Rosa DO  Primary Care Physician: No primary care provider on file. Reason for Consult: right breast mass    Diagnosis Present on Admission:   Suspected CHF (congestive heart failure)   Malignancy (HCC)   Leg swelling   Cellulitis   New onset of congestive heart failure (HCC)   Acute on chronic systolic heart failure (HCC)   Type 2 diabetes mellitus without complication, without long-term current use of insulin (HCC)   Aortic atherosclerosis (HCC)   Nicotine dependence      History of Present Illness  [place] is a 55 y.o. female admitted on 2022 for shortness of breath and increasing leg swelling. She was noted to have a large right breast mass which has been present for several years. Intermittent bleeding, no pain. Chest CT was obtained, and the fungating breast mass was noted, as well as axillary node enlargement. Past Medical History:   Diagnosis Date    Breast cancer [facility])      History reviewed. No pertinent surgical history. History reviewed. No pertinent family history. Social History     Socioeconomic History    Marital status:      Spouse name: Not on file    Number of children: Not on file    Years of education: Not on file    Highest education level: Not on file   Occupational History    Not on file   Tobacco Use    Smoking status: Current Every Day Smoker     Packs/day: 1.50     Years: 43.00     Pack years: 64.50    Smokeless tobacco: Never Used   Substance and Sexual Activity    Alcohol use:  Yes     Alcohol/week: 1.0 - 3.0 standard drink     Types: 1 - 3 Cans of beer per week     Comment: 22 last drink    Drug use: Not on file    Sexual activity: Not on file   Other Topics Concern    Not on file   Social History Narrative    Not on file     Social Determinants of Health     Financial Resource Strain:     Difficulty of Paying Living Expenses: Not on file   Food Insecurity:     Worried About Running Out of Food in the Last Year: Not on file    Partha of Food in the Last Year: Not on file   Transportation Needs:     Lack of Transportation (Medical): Not on file    Lack of Transportation (Non-Medical): Not on file   Physical Activity:     Days of Exercise per Week: Not on file    Minutes of Exercise per Session: Not on file   Stress:     Feeling of Stress : Not on file   Social Connections:     Frequency of Communication with Friends and Family: Not on file    Frequency of Social Gatherings with Friends and Family: Not on file    Attends Quaker Services: Not on file    Active Member of [address] [place] [address] [employer] or Organizations: Not on file    Attends Club or Organization Meetings: Not on file    Marital Status: Not on file   Intimate Partner Violence:     Fear of Current or Ex-Partner: Not on file    Emotionally Abused: Not on file    Physically Abused: Not on file    Sexually Abused: Not on file   Housing Stability:     Unable to Pay for Housing in the Last Year: Not on file    Number of [place] in the Last Year: Not on file    Unstable Housing in the Last Year: Not on file     No Known Allergies  Prior to Admission medications    Not on File       Review of Systems  As per HPI, otherwise negative    Physical Exam  BP 96/60   Pulse 89   Temp 98.7 °F (37.1 °C) (Oral)   Resp 20   Ht 5' 6\" (1.676 m)   Wt 256 lb 9.9 oz (116.4 kg)   SpO2 92%   BMI 41.42 kg/m²       Intake/Output Summary (Last 24 hours) at 5/9/2022 1650  Last data filed at 5/9/2022 1636  Gross per 24 hour   Intake 1560 ml   Output 3900 ml   Net -2340 ml       Body mass index is 41.42 kg/m². General/Appearance: WDWN female, alert, oriented, NAD  Breast - right side - large central fungating exophytic mass. Adenopathy present. Left breast - generalized swelling. No masses.  Inframammary fungal rash present. Labs  Recent Labs     05/08/22  1645 05/08/22  2148 05/09/22  0609   WBC 8.0  --  10.8   HGB 12.7  --  12.4   HCT 41.3  41.0  --  39.8     --  300   APTT  --  27.1  --    INR  --  1.41*  --      Recent Labs     05/08/22  1645 05/09/22  0609    140   K 3.9 3.6   CL 96* 99   CO2 30 29   BUN 7 6*   GFRAA >60 >60   MG  --  1.50*     Recent Labs     05/08/22  1645   AST 11*   ALT 6*     No results for input(s): UOSM in the last 72 hours. Invalid input(s): UAPR, UCOL, [facility], [place], [facility], [place], [place], [facility]    Imaging  VL Extremity Venous Bilateral   Final Result      CT CHEST ABDOMEN PELVIS W CONTRAST   Final Result   1. CT CHEST: Mild congestive heart failure. 2. Nonspecific main pulmonary artery dilatation can be seen with pulmonary   hypertension. 3. Mild atelectasis bilateral lower lungs and small volume right pleural   effusion. Pneumonia is a consideration. 4. Large fungating right breast mass with diffuse right breast skin   thickening almost certainly primary breast cancer. 5. Bilateral axillary lymph node enlargement may be reactive or reflect   metastatic disease. 6. Anasarca. 7. CT ABDOMEN/PELVIS: No acute abnormality. 8. Nonspecific left adrenal 1.5 cm nodule. Follow-up noncontrast CT abdomen   no sooner than 48 hours from contrast administration recommended. 9. Nonspecific mild adenopathy right upper quadrant common with hepatic   steatosis, probably reactive. 10. Nonspecific splenomegaly. 11. Hepatic steatosis and hepatomegaly. 12. Anasarca. XR CHEST PORTABLE   Final Result   Pulmonary vascular congestion/interstitial edema. Cardiomegaly. Assessment  Laura Mireles is a 55 y.o. female with large fungating right breast cancer, locally advanced. We were asked to see her by oncology for surgical biopsy.     Plan    Biopsy obtained at bedside today:    PREOPERATIVE DIAGNOSIS: right breast mass    POSTOPERATIVE DIAGNOSIS: same    PROCEDURE:right breast incisional biopsy    SURGEON: Kanika    ESTIMATED BLOOD LOSS:  Less than 25 mL    ASSISTANT: Elsa Blulock    ANESTHESIA: none    INDICATIONS FOR PROCEDURE: The patient is a 55 y.o. female who had  presented with a right breast mass. She is here now for incisional biopsy of the mass. The risks, benefits and alternatives were discussed with the  patient. Questions were answered and she is agreeable to proceed. DESCRIPTION OF OPERATION: At the bedside, the area was cleansed with chlorhexidine. A scalpel was used to excise a 1 cm portion of the mass from the medial edge of the tumor. It was sent to pathology for permanent section. Hemostasis was obtained with pressure, silver nitrate, and gelfoam. It was covered with a dry dressing. The patient tolerated the procedure well.        Electronically signed by Mo Moncada MD on 5/9/2022 at 4:53 PM

## 2022-05-09 NOTE — CONSULTS
Infectious Diseases Inpatient Consult Note      Reason for Consult:  Rt breast fungating mass with secondary infection concern for malignancy     Requesting Physician:       Primary Care Physician:  No primary care provider on file. History Obtained From:  Epic and patient    CHIEF COMPLAINT:     Chief Complaint   Patient presents with    Leg Swelling     bilat on and off x 1 year, pt states \"I ate a lot of salt lately\"       Breast cellulitis and Fungating mass     HISTORY OF PRESENT ILLNESS:  55 y.o. woman not seen physicians for long time now admitted with lower leg swelling, dizziness, Rt breast fungating mass with odor and drainage she also has Left breast swelling with on going fungal dermatitis, bi lateral lower leg edema with poor skin hygiene and poor dentition. She has morbid obesity BMI at  41, she is on high flow oxygen since admit due to sob. She was seen by Oncology and work up in progress for possible breast cancer. Pt has know breast mass for years but did not seek medical attention due to fear and denial per family at bed side. Ct chest/abd/pelvis results noted with anasarca, hepatomegaly and bi lateral axillary adenopathy with Rt Breast fungating mass. Location :Rt breast mass, pain, drainage odor +        Quality :aching        Severity  8/10:     Duration :months       Timing : intermittent   Context : Fungating breast mass     Modifying factors : none   Associated signs and symptoms: sob , cough , dizziness        Past Medical History:    History reviewed. No pertinent past medical history. Past Surgical History:    History reviewed. No pertinent surgical history. Current Medications:    No outpatient medications have been marked as taking for the 5/8/22 encounter [facility] Encounter). Allergies:  Patient has no known allergies. Immunizations : There is no immunization history on file for this patient.       Social History:     Social History     Tobacco Use    Smoking status: Current Every Day Smoker     Packs/day: 1.50     Years: 43.00     Pack years: 64.50    Smokeless tobacco: Never Used   Substance Use Topics    Alcohol use: Yes     Alcohol/week: 1.0 - 3.0 standard drink     Types: 1 - 3 Cans of beer per week     Comment: 05/07/22 last drink    Drug use: Not on file     Social History     Tobacco Use   Smoking Status Current Every Day Smoker    Packs/day: 1.50    Years: 43.00    Pack years: 64.50   Smokeless Tobacco Never Used      Family History :  No DVT no COPD       REVIEW OF SYSTEMS:     Constitutional:  negative for fevers, chills, night sweats  Eyes:  negative for blurred vision, eye discharge, visual disturbance   HEENT:  negative for hearing loss, ear drainage,nasal congestion  Respiratory:    cough ++ , shortness of breath ++  or hemoptysis   Cardiovascular:  negative for chest pain, palpitations, syncope  Gastrointestinal:  negative for nausea, vomiting, diarrhea, constipation, abdominal pain  Genitourinary:  negative for frequency, dysuria, urinary incontinence, hematuria  Hematologic/Lymphatic:  negative for easy bruising, bleeding and lymphadenopathy  Allergic/Immunologic:  negative for recurrent infections, angioedema, anaphylaxis   Endocrine:  negative for weight changes, polyuria, polydipsia and polyphagia  Musculoskeletal:  Rt breast fungating mass + lower leg edema++   Integumentary: No rashes, skin lesions  Neurological:  negative for headaches, slurred speech, unilateral weakness  Psychiatric: negative for hallucinations,confusion,agitation.      PHYSICAL EXAM:      Vitals:    BP (!) 92/48   Pulse 87   Temp 97.7 °F (36.5 °C) (Oral)   Resp 18   Ht 5' 6\" (1.676 m)   Wt 256 lb 9.9 oz (116.4 kg)   SpO2 95%   BMI 41.42 kg/m²     General Appearance: alert,in some  acute distress, ++  pallor, no icterus  On nasal cannula + poor dentition ++  Skin: warm and dry, no rash or erythema  Head: normocephalic and atraumatic  Eyes: pupils equal, round, and reactive to light, conjunctivae normal  ENT: tympanic membrane, external ear and ear canal normal bilaterally, nose without deformity, nasal mucosa and turbinates normal without polyps  Neck: supple and non-tender without mass, no thyromegaly  no cervical lymphadenopathy  Pulmonary/Chest: Bi basal crepts++  wheezes, rales or rhonchi, normal air movement, in some  respiratory distress  Cardiovascular: normal rate, regular rhythm, normal S1 and S2, no murmurs, rubs, clicks, or gallops, no carotid bruits  Abdomen: soft, non-tender, non-distended, normal bowel sounds, no masses or organomegaly  Extremities: no cyanosis, clubbing or ++  edema  Musculoskeletal: normal range of motion, no joint swelling, deformity or tenderness  Integumentary: No rashes, no abnormal skin lesions, no petechiae  Neurologic: reflexes normal and symmetric, no cranial nerve deficit  Psych:  Orientation, sensorium, mood normal   Lines:IV  Rt breast fungating mass++ odor+ drainage  Left beast edema and fungal rash ++       DATA:    CBC:   Lab Results   Component Value Date    WBC 10.8 05/09/2022    HGB 12.4 05/09/2022    HCT 39.8 05/09/2022    MCV 74.2 (L) 05/09/2022     05/09/2022     RENAL:   Lab Results   Component Value Date    CREATININE 0.8 05/09/2022    BUN 6 (L) 05/09/2022     05/09/2022    K 3.6 05/09/2022    CL 99 05/09/2022    CO2 29 05/09/2022     SED RATE: No results found for: SEDRATE  CK: No results found for: CKTOTAL  CRP: No results found for: CRP  Hepatic Function Panel:   Lab Results   Component Value Date    ALKPHOS 93 05/08/2022    ALT 6 05/08/2022    AST 11 05/08/2022    PROT 6.9 05/08/2022    PROT 8.1 01/22/2011    BILITOT 1.1 05/08/2022    LABALBU 3.4 05/08/2022     UA:  Lab Results   Component Value Date    COLORU Yellow 05/08/2022    CLARITYU CLOUDY 05/08/2022    GLUCOSEU Negative 05/08/2022    BILIRUBINUR Negative 05/08/2022    KETUA Negative 05/08/2022    SPECGRAV 1.005 05/08/2022 BLOODU Negative 05/08/2022    PHUR 5.0 05/09/2022    PROTEINU Negative 05/08/2022    UROBILINOGEN 1.0 05/08/2022    NITRU Negative 05/08/2022    LEUKOCYTESUR Negative 05/08/2022    LABMICR YES 05/08/2022    URINETYPE Other 05/08/2022      Urine Microscopic:   Lab Results   Component Value Date    BACTERIA None Seen 05/08/2022    HYALCAST 1 05/08/2022    WBCUA 1 05/08/2022    RBCUA 1 05/08/2022    EPIU 3 05/08/2022     Urine Reflex to Culture:   Lab Results   Component Value Date    URRFLXCULT Not Indicated 05/08/2022         MICRO: cultures reviewed and updated by me     Date/Time       Culture, Blood 1 [[id number]] Collected: 05/08/22 1911   Order Status: Sent Specimen: Blood Updated: 05/08/22 2152   Culture, Blood 2 [[id number]] Collected: 05/08/22 1911   Order Status: Sent Specimen: Blood Updated: 05/08/22 1916       Blood Culture: No results found for: BC, BLOODCULT2    Viral Culture:    No results found for: COVID19  Urine Culture: No results for input(s): Lesa Brown in the last 72 hours. Scheduled Meds:   heparin (porcine)  5,000 Units SubCUTAneous 3 times per day    lisinopril  5 mg Oral Daily    furosemide  40 mg IntraVENous BID    miconazole   Topical BID    cefepime  2,000 mg IntraVENous Q12H    fluconazole  200 mg Oral Daily    metroNIDAZOLE  500 mg IntraVENous Q8H    sodium chloride flush  5-40 mL IntraVENous 2 times per day    multivitamin  1 tablet Oral Daily    thiamine  100 mg Oral Daily    vancomycin (VANCOCIN) intermittent dosing (placeholder)   Other RX Placeholder    vancomycin  1,000 mg IntraVENous Q12H       Continuous Infusions:   sodium chloride         PRN Meds:  naloxone, acetaminophen **OR** acetaminophen, ondansetron, perflutren lipid microspheres, nicotine, sodium chloride flush, sodium chloride, naloxone    Imaging:   VL Extremity Venous Bilateral         CT CHEST ABDOMEN PELVIS W CONTRAST   Final Result   1. CT CHEST: Mild congestive heart failure.    2. Nonspecific main pulmonary artery dilatation can be seen with pulmonary   hypertension. 3. Mild atelectasis bilateral lower lungs and small volume right pleural   effusion. Pneumonia is a consideration. 4. Large fungating right breast mass with diffuse right breast skin   thickening almost certainly primary breast cancer. 5. Bilateral axillary lymph node enlargement may be reactive or reflect   metastatic disease. 6. Anasarca. 7. CT ABDOMEN/PELVIS: No acute abnormality. 8. Nonspecific left adrenal 1.5 cm nodule. Follow-up noncontrast CT abdomen   no sooner than 48 hours from contrast administration recommended. 9. Nonspecific mild adenopathy right upper quadrant common with hepatic   steatosis, probably reactive. 10. Nonspecific splenomegaly. 11. Hepatic steatosis and hepatomegaly. 12. Anasarca. XR CHEST PORTABLE   Final Result   Pulmonary vascular congestion/interstitial edema. Cardiomegaly. All pertinent images and reports for the current Hospitalization were reviewed by me. IMPRESSION:    Patient Active Problem List   Diagnosis    Suspected CHF (congestive heart failure)    Malignancy (HCC)    Leg swelling    Cellulitis     Acute Hypoxic resp failure  CHF  Anasarca  Suspect COPD with significant Smoking history   Obesity BMI at  39  Rt breast Fungating mass +  Left breast fungal dermatitis  DM new diagnosis  Smoking ++  Alcohol abuse  Lower leg edema  CT chest with fungating mass and axillary adenopathy       Seen by Multiple services due to multiple medical issues main concern is the locally advanced fungating mass Rt breast concern for Malignancy and biopsy pending and she is volume over loaded and hypoxic    Will need IV abx for the secondary infection due to fungating mass         Labs, Microbiology, Radiology and pertinent results from current hospitalization and care every where were reviewed by me as a part of the consultation.     PLAN :  1. Cont IV Cefepime x 2 gm q 12 hrs  2. Change to oral Flagyl x 500 mg q 8 HR  3. Cont IV Vancomycin x 1 gm Q 12 hrs  4. Oral Fluconazole x 200 mg   5. Wound cx requested  6. Breast biopsy completed by Gen surgery   7. Check HIV and hepatitis screen   8. Quit smoking   9. Watch for DTS       Discussed with patient/Family and Nursing   Risk of Complications/Morbidity: High      · Illness(es)/ Infection present that pose threat to bodily function. · There is potential for severe exacerbation of infection/side effects of treatment. Therapy requires intensive monitoring for antimicrobial agent toxicity. Thanks for allowing me to participate in your patient's care please call me with any questions or concerns.     Dr. Itz Han MD  [address] [address] Physician  Phone: [phone]   Fax : [phone]

## 2022-05-09 NOTE — PLAN OF CARE
Problem: Discharge Planning  Goal: Discharge to home or other facility with appropriate resources  Outcome: Progressing  Flowsheets  Taken 5/8/2022 2247  Discharge to home or other facility with appropriate resources: Refer to discharge planning if patient needs post-hospital services based on physician order or complex needs related to functional status, cognitive ability or social support system  Taken 5/8/2022 2234  Discharge to home or other facility with appropriate resources: Refer to discharge planning if patient needs post-hospital services based on physician order or complex needs related to functional status, cognitive ability or social support system     Problem: Safety - Adult  Goal: Free from fall injury  Outcome: Progressing     Problem: ABCDS Injury Assessment  Goal: Absence of physical injury  Outcome: Progressing     Problem: Skin/Tissue Integrity  Goal: Absence of new skin breakdown  Description: 1. Monitor for areas of redness and/or skin breakdown  2. Assess vascular access sites hourly  3. Every 4-6 hours minimum:  Change oxygen saturation probe site  4. Every 4-6 hours:  If on nasal continuous positive airway pressure, respiratory therapy assess nares and determine need for appliance change or resting period.   Outcome: Progressing

## 2022-05-09 NOTE — PROGRESS NOTES
Hospitalist Progress Note  5/9/2022 2:48 PM    PCP: No primary care provider on file. [phone]     Date of Admission: 5/8/2022                                                                                                                     HOSPITAL COURSE    Patient demographics:  The patient  Brandie Miller is a 55 y.o. female     Significant past medical history:   Patient Active Problem List   Diagnosis    Suspected CHF (congestive heart failure)    Malignancy ([facility] [address]    Leg swelling    Cellulitis    New onset of congestive heart failure ([facility] [address]         Presenting symptoms:  Leg swelling, shortness of breath, breast swelling and lesion    Diagnostic workup:      CONSULTS DURING ADMISSION :   IP CONSULT TO HEART FAILURE NURSE/COORDINATOR  IP CONSULT TO DIETITIAN  IP CONSULT TO INFECTIOUS DISEASES  PHARMACY TO DOSE VANCOMYCIN  IP CONSULT TO GENERAL SURGERY  IP CONSULT TO OB GYN  IP CONSULT TO ONCOLOGY  IP CONSULT TO CARDIOLOGY  IP CONSULT TO SOCIAL WORK  IP CONSULT TO PULMONOLOGY  IP CONSULT TO PALLIATIVE CARE  IP CONSULT TO [place]      Patient was diagnosed with:        Treatment while inpatient:                                                                                         ----------------------------------------------------------      SUBJECTIVE COMPLAINTS- follow up for CHF    Diet: ADULT DIET; Regular; 4 carb choices (60 gm/meal); Low Sodium (2 gm); 1500 ml      OBJECTIVE:   Patient Active Problem List   Diagnosis    Suspected CHF (congestive heart failure)    Malignancy (HCC)    Leg swelling    Cellulitis    New onset of congestive heart failure ([facility] [address]       Allergies  Patient has no known allergies.     Medications    Scheduled Meds:   metroNIDAZOLE  500 mg Oral 3 times per day    lidocaine-EPINEPHrine  20 mL IntraDERmal Once    magnesium sulfate  4,000 mg IntraVENous Once    heparin (porcine)  5,000 Units SubCUTAneous 3 times per day    lisinopril  5 mg Oral Daily  furosemide  40 mg IntraVENous BID    miconazole   Topical BID    cefepime  2,000 mg IntraVENous Q12H    fluconazole  200 mg Oral Daily    sodium chloride flush  5-40 mL IntraVENous 2 times per day    multivitamin  1 tablet Oral Daily    thiamine  100 mg Oral Daily    vancomycin (VANCOCIN) intermittent dosing (placeholder)   Other RX Placeholder    vancomycin  1,000 mg IntraVENous Q12H     Continuous Infusions:   sodium chloride       PRN Meds:  naloxone, acetaminophen **OR** acetaminophen, ondansetron, perflutren lipid microspheres, nicotine, sodium chloride flush, sodium chloride, naloxone    Vitals   Vitals /wt   Patient Vitals for the past 8 hrs:   BP Temp Temp src Pulse Resp SpO2   05/09/22 1220 -- -- -- -- 27 94 %   05/09/22 1100 117/73 97 °F (36.1 °C) Oral 87 13 93 %   05/09/22 0900 -- -- -- -- 18 95 %   05/09/22 0832 -- -- -- -- 18 92 %   05/09/22 0757 (!) 92/48 -- -- -- -- --   05/09/22 0730 (!) 93/47 97.7 °F (36.5 °C) Oral 87 24 92 %        72HR INTAKE/OUTPUT:      Intake/Output Summary (Last 24 hours) at 5/9/2022 1448  Last data filed at 5/9/2022 1438  Gross per 24 hour   Intake 1080 ml   Output 3900 ml   Net -2820 ml       Exam:    Gen:   Alert and oriented ×3  Eyes: PERRL. No sclera icterus. No conjunctival injection. ENT: No discharge. Pharynx clear. External appearance of ears and nose normal.  Neck: Trachea midline. No obvious mass. Resp: No accessory muscle use. No crackles. No wheezes. No rhonchi. CV: Regular rate. Regular rhythm. No murmur or rub. No edema. GI: Non-tender. Non-distended. No hernia. Skin: Warm, dry, normal texture and turgor. Lymph: No cervical LAD. No supraclavicular LAD. M/S: / Ext. No cyanosis. No clubbing. No joint deformity. Neuro: CN 2-12 are intact,  no neurologic deficits noted.     PT/INR:   Recent Labs     05/08/22  2148   PROTIME 16.2*   INR 1.41*     APTT:   Recent Labs     05/08/22  2148   APTT 27.1       CBC:   Recent Labs 05/08/22  1645 05/09/22  0609   WBC 8.0 10.8   HGB 12.7 12.4   HCT 41.3  41.0 39.8   MCV 73.7* 74.2*    300       BMP:   Recent Labs     05/08/22  1645 05/09/22  0609    140   K 3.9 3.6   CL 96* 99   CO2 30 29   BUN 7 6*   CREATININE 0.8 0.8       LIVER PROFILE:   Recent Labs     05/08/22  1645   ALKPHOS 93   AST 11*   ALT 6*   BILITOT 1.1*     No results for input(s): AMYLASE in the last 72 hours. No results for input(s): LIPASE in the last 72 hours. UA:  Recent Labs     05/08/22  1645   WBCUA 1   RBCUA 1       TROPONIN:   Recent Labs     05/08/22  1645 05/08/22  2148   TROPONINI 0.01 0.01       Lab Results   Component Value Date/Time    URRFLXCULT Not Indicated 05/08/2022 04:45 PM       No results for input(s): TSHREFLEX in the last 72 hours.     No components found for: [id number]  POC GLUCOSE:    Recent Labs     05/08/22  2326   POCGLU 174*     Recent Labs     05/09/22  0609   LABA1C 7.0      Lab Results   Component Value Date    LABA1C 7.0 05/09/2022         ASSESSMENT AND PLAN  Acute on chronic diastolic CHF  Effusion and anasarca  Continue on diuretics    Acute respiratory failure with hypoxia and hypercapnia  On BiPAP support    Leg swelling        Cellulitis  Locally advanced right-sided breast cancer  She will need outside PET scan for further evaluation  Continue antibiotics  Hematology oncology is following    Pneumonia  Continue antibiotics  Patient started on vancomycin/cefepime/Flagyl and fluconazole, started on miconazole powder for breast cellulitis  ID is consulted      Malignancy    Smoking abuse    Altered mental status      Tobacco abuse Z72.0  Nicotine patch and counseling      May need to consider steroid therapy later if hypercarbia continues, holding steroid therapy if patient does need biopsy evaluation of malignancy  Consult to infectious disease  Consult to cardiology  Consult oncology  Consult to general surgery  Consult to OB/GYN  Pulmonary consult        Code Status: Full Code        Dispo -cc        The patient and / or the family were informed of the results of any tests, a time was given to answer questions, a plan was proposed and they agreed with plan. Mark Escalante MD    This note was transcribed using [employer]. Please disregard any translational errors.

## 2022-05-09 NOTE — PROGRESS NOTES
4 Eyes Skin Assessment     NAME:  Leyla Turpin  YOB: 1965  MEDICAL RECORD NUMBER:  [id number]     The patient is being assess for  Admission    I agree that 2 RN's have performed a thorough Head to Toe Skin Assessment on the patient. ALL assessment sites listed below have been assessed. Areas assessed by both nurses:    Head, Face, Ears, Shoulders, Back, Chest, Arms, Elbows, Hands and Sacrum. Buttock, Coccyx, Ischium        Does the Patient have a Wound? Yes wound(s) were present on assessment.  LDA wound assessment was Initiated and completed        Mayur Prevention initiated:  No   Wound Care Orders initiated:  Yes    Pressure Injury (Stage 3,4, Unstageable, DTI, NWPT, and Complex wounds) if present place consult order under [de-identified] No    New and Established Ostomies if present place consult order under : No      Nurse 1 eSignature: Electronically signed by Damian Pacheco RN on 5/9/22 at 6:34 AM EDT    **SHARE this note so that the co-signing nurse is able to place an eSignature**    Nurse 2 eSignature: Electronically signed by Pascual Hoffmann RN on 5/10/22 at 7:01 AM EDT

## 2022-05-09 NOTE — PROGRESS NOTES
Pt arrived to floor via stretcher from ED and ambulated to bed. Telemetry activated. Patient oriented to room and use of call light. Call light and personal items within reach. Admission and assessment initiated. POC and education initiated and reviewed with patient. Denied further needs or questions at this time. Will continue to monitor.

## 2022-05-09 NOTE — CARE COORDINATION
[facility] Wound Ostomy Continence Nurse  Consult Note       NAME:  Angeles Garza  MEDICAL RECORD NUMBER:  [id number]  AGE: 55 y.o. GENDER: female  : 1965  TODAY'S DATE:  2022    Subjective   Reason for WOCN Evaluation and Assessment: fungating mass right breast (presued cancer per oncology notes)    Angeles Garza is a 55 y.o. female referred by:   [] Physician  [x] Nursing  [] Other:     Wound Identification:  Wound Type: malignant wound -supected  Contributing Factors: none    Wound History: mass present for years; pt had not sought out tx  Current Wound Care Treatment:  sacral border    Patient Goal of Care:  [] Wound Healing  [x] Odor Control  [] Palliative Care  [] Pain Control   [] Other:         PAST MEDICAL HISTORY        Diagnosis Date    Breast cancer ([facility] [address]        PAST SURGICAL HISTORY    History reviewed. No pertinent surgical history. FAMILY HISTORY    History reviewed. No pertinent family history. SOCIAL HISTORY    Social History     Tobacco Use    Smoking status: Current Every Day Smoker     Packs/day: 1.50     Years: 43.00     Pack years: 64.50    Smokeless tobacco: Never Used   Substance Use Topics    Alcohol use: Yes     Alcohol/week: 1.0 - 3.0 standard drink     Types: 1 - 3 Cans of beer per week     Comment: 22 last drink    Drug use: Not on file       ALLERGIES    No Known Allergies    MEDICATIONS    No current facility-administered medications on file prior to encounter. No current outpatient medications on file prior to encounter.        Objective    /73   Pulse 87   Temp 97 °F (36.1 °C) (Oral)   Resp 27   Ht 5' 6\" (1.676 m)   Wt 256 lb 9.9 oz (116.4 kg)   SpO2 94%   BMI 41.42 kg/m²     LABS:  WBC:    Lab Results   Component Value Date    WBC 10.8 2022     H/H:    Lab Results   Component Value Date    HGB 12.4 2022    HCT 39.8 2022     PTT:    Lab Results   Component Value Date    APTT 27.1 2022   [APTT}  PT/INR:    Lab apply foam border. Change every other day. Specialty Bed Required : No   [] Low Air Loss   [] Pressure Redistribution  [] Fluid Immersion  [] Bariatric  [] Total Pressure Relief  [] Other:     Current Diet: ADULT DIET; Regular; 4 carb choices (60 gm/meal);  Low Sodium (2 gm); 1500 ml  Dietician consult:  No    Discharge Plan:  Placement for patient upon discharge: home with support    Patient appropriate for Outpatient [address]: No    Referrals:  []   [facility]  [] Supplies  [x] Other: palliative care    Patient/Caregiver Teaching:  Level of patient/caregiver understanding able to:   [] Indicates understanding       [x] Needs reinforcement  [] Unsuccessful      [] Verbal Understanding  [] Demonstrated understanding       [] No evidence of learning  [] Refused teaching         [] N/A       Electronically signed by Erica Quiñonez on 5/9/2022 at 1:33 PM

## 2022-05-09 NOTE — PROGRESS NOTES
Physical Therapy  Facility/Department: [facility] 5N PROGRESSIVE CARE  Physical Therapy Initial Assessment    Name: [place]  : 1965  MRN: [id number]  Date of Service: 2022    Discharge Recommendations:  Patient would benefit from continued therapy after discharge,Continue to assess pending progress   PT Equipment Recommendations  Equipment Needed: No      Patient Diagnosis(es): The primary encounter diagnosis was New onset of congestive heart failure ([facility] [address] Diagnoses of Breast mass, right, Cellulitis, unspecified cellulitis site, and Acute respiratory failure with hypoxia ([address] were also pertinent to this visit. Past Medical History:  has a past medical history of Breast cancer ([facility] [address] Past Surgical History:  has no past surgical history on file. Assessment   Body Structures, Functions, Activity Limitations Requiring Skilled Therapeutic Intervention: Decreased functional mobility ; Decreased endurance  Assessment: Pt is a 55 y.o. F. admitted  for edema, R-sided breast Ca. She is currently on 8 L. O2, and presents pleasant and agreeable to evaluation. PT noted functional LE strength, and pt was able to ambulate short distances in room with SBA, no AD. She would benefit from continued therapy to improve her endurance. If her medical status improves, she could return directly home. Will continue to assess pending progress. [place] scored a 18/24 on the AM-PAC short mobility form. If patient discharges prior to next session this note will serve as a discharge summary. Please see below for the latest assessment towards goals. Specific Instructions for Next Treatment: Improve endurance; Ambulate  Therapy Prognosis: Guarded  Decision Making: Low Complexity  History: See below  Exam: Strength; ROM; Balance;  Ambulation  Clinical Presentation: Stable  Barriers to Learning: Fatigue; SOB  Activity Tolerance  Activity Tolerance: Patient tolerated evaluation without incident     Plan Plan  Plan: 2-3 times per week  Specific Instructions for Next Treatment: Improve endurance; Ambulate  Current Treatment Recommendations: Strengthening,Balance training,Gait training,Functional mobility training,Transfer training,Endurance training,Home exercise program,Equipment evaluation, education, & procurement,Therapeutic activities,Safety education & training,Patient/Caregiver education & training  Safety Devices  Type of Devices: All fall risk precautions in place,Chair alarm in place,Call light within reach,Gait belt,Left in chair  Restraints  Restraints Initially in Place: No     Restrictions  Restrictions/Precautions  Restrictions/Precautions: Fall Risk     Subjective   General  Chart Reviewed: Yes  Additional Pertinent Hx: The patient is a 41-year-old female who hasnot seen a doctor in many years, who comes in to the hospital withincreasing lower extremity edema and shortness of breath. She hadmultiple issues. She had increasing lower extremity edema. An echo hasbeen ordered and a Doppler has been ordered. She also had a very largelocally advanced right-sided breast mass that was fungating and quitered. Response To Previous Treatment: Not applicable  Referring Practitioner: Annie Jonas MD  Referral Date : 05/09/22  Diagnosis: R-sided breast Ca; Edema  Follows Commands: Within Functional Limits  Subjective  Subjective: Pt is pleasant and agreeable to evaluation. Social/Functional History  Social/Functional History  Lives With: Spouse  Type of Home: House  Home Layout: Two level,Able to Live on Main level with bedroom/bathroom  Home Access: Stairs to enter without rails  Entrance Stairs - Number of Steps: 3-4  Bathroom Shower/Tub: Tub/Shower unit  Bathroom Toilet: Standard  Home Equipment:  (No DME)  ADL Assistance: Independent  Ambulation Assistance: Independent  Transfer Assistance: Independent  Active :  Yes  Additional Comments: No Falls     Vision/Hearing  Vision Exceptions: Wears glasses at all times  Hearing: Within functional limits      Cognition   Orientation  Overall Orientation Status: Within Normal Limits     Objective     AROM RLE (degrees)  RLE AROM: WFL  AROM LLE (degrees)  LLE AROM : WFL  AROM RUE (degrees)  RUE AROM : WFL  AROM LUE (degrees)  LUE AROM : WFL    Strength RLE  Strength RLE: WFL  Comment: Hip Flex, Knee Flex/Ext WFL  Strength LLE  Strength LLE: WFL  Comment: Hip Flex, Knee Flex/Ext WFL  Strength RUE  Strength RUE: WFL  Comment: Shoulder Flex, Elbow Flex/Ext WFL  Strength LUE  Strength LUE: WFL  Comment: Shoulder Flex, Elbow Flex/Ext WFL     Bed mobility  Supine to Sit: Stand by assistance     Transfers  Sit to Stand: Stand by assistance  Stand to sit: Stand by assistance     Ambulation  Surface: level tile  Device: No Device (IV pole support)  Assistance: Stand by assistance  Quality of Gait: Fast pace, short steps, no LOB. Limited by fatigue. Distance: 15', 4'     Balance  Comments: March in place x 1 min., (B) UE support, SBA. Able to maintain static sitting balance with supervision. Able to maintain static standing balance with SBA. AM-PAC Score  AM-PAC Inpatient Mobility Raw Score : 18 (05/09/22 1534)  AM-PAC Inpatient T-Scale Score : 43.63 (05/09/22 1534)  Mobility Inpatient CMS 0-100% Score: 46.58 (05/09/22 1534)  Mobility Inpatient CMS G-Code Modifier : CK (05/09/22 1534)    Goals  Short Term Goals  Time Frame for Short term goals: 2-3 days  Short term goal 1: Bed mobility (I)  Short term goal 2: Transfers (I)  Short term goal 3: Ambulation 48' with LRAD, (I)  Short term goal 4: Ascend/Descend 4 steps with SBA  Patient Goals   Patient goals :  To return directly home       Therapy Time   Individual Concurrent Group Co-treatment   Time In 1505         Time Out 1543         Minutes 38         Timed Code Treatment Minutes: 25 Minutes   Low Complexity Evaluation    Canelo Naik PT    Electronically signed by Canelo Naik PT [id number] on 5/9/2022 at 3:45 PM

## 2022-05-09 NOTE — CONSULTS
Oncology Consult    See dictation    A/P:  1. Locally advanced right breast cancer. She has ignored this mass for years. She will need an outpatient PET/CT to evaluate some indeterminate lymphadenopathy and adrenal lesion. I will get a bone scan. If her disease is localized to the breast, the standard of care would be upfront chemotherapy followed by possible surgery down the road. She is not sure if she wants to go through all that. I will consult palliative care. I will also consult surgery for a biopsy. 2.  Edema. Dopplers have been ordered. An echo has been ordered. Thank you for the consultation. I will follow closely.     Alyce Murrieta MD

## 2022-05-09 NOTE — CONSULTS
PALLIATIVE MEDICINE CONSULTATION     Patient name:Danielito Denton   RJU:[id number]    :1965  Room/Bed:O7Q-4436/5277-01   LOS: 1 day         Date of consult:2022    Consult Information    Inpatient consult to Palliative Care  Consult performed by: APRIL Watson CNP  Consult ordered by: Lamine Bojorquez MD         Reason for Consult: Goals of Care, Code Status     ASSESSMENT/RECOMMENDATIONS     55 y.o. female with hypoxia and swelling. Symptom Management:  1. Hypoxia: Patient on 9 liters of oxygen via a nasal cannula. 2. Swelling: Patient has Lasix 40 mg scheduled BID. 3. Goals of Care: Met patient and her mother Emily at the bedside today. Patient was lethargic but oriented x 4. Patient did appear to be decisional today. Reviewed patient's current health status, goals of care and code status. Patient indicated she is not interested in hospice services at this time and would like to pursue all aggressive care at this time. Code status was reviewed and the patient would like to remain a Full Code. Patient/Family Goals of Care :     - Met patient and her mother Emily at the bedside today. Patient was lethargic but oriented x 4. Patient did appear to be decisional today. Reviewed patient's current health status, goals of care and code status. Patient indicated she is not interested in hospice services at this time and would like to pursue all aggressive care at this time. Code status was reviewed and the patient would like to remain a Full Code. Disposition/Discharge Plan:   Pending. Advance Directives:  · Surrogate Decision Maker: Per patient, she does not have POA paperwork completed but would like assistance in naming her mother Emily as her POA. Consult placed to Spiritual Team.  Current surrogate decision maker would be patient's spouse Sam Sue until new POA paperwork was completed. Patient was decisional today.      · Code status:  Full Code    Case discussed with: patient, floor RN, Emily (patient's mother). Thank you for allowing us to participate in the care of this patient. HISTORY     CC: Hypoxia  HPI: The patient is a 55 y.o. female with no major past medical history who presented to [facility] with leg swelling, shortness of breath and breast mass. The patient was admitted with new onset of congestive heart failure, breast mass, cellulitis and acute respiratory failure. Palliative Medicine SymptomScreening/ROS:    Review of Systems   Constitutional: Positive for fatigue. HENT: Negative. Eyes: Negative. Respiratory: Positive for shortness of breath. Cardiovascular: Positive for leg swelling. Gastrointestinal: Negative. Musculoskeletal: Negative. Skin: Positive for pallor. Neurological: Positive for weakness. Psychiatric/Behavioral: Negative. All other systems reviewed and are negative. A complete 10 count ROS was obtained. Pertinent positives mentioned above in HPI/ROS. All others if not mentioned are negative. Palliative Performance Scale:     [x] 60%  Amb reduced; Sig dz. Can't do hobbies/housework; Intake normal or reduced, Occasional assist; LOC full/confusion   [] 50%  Mainly sit/lie; Extensive disease. Mainly assist, Intake normal or reduced; Occasional assist; LOC full/confusion   [] 40%  Mainly in bed; Extensive disease; Mainly assist; Intake normal or reduced; Occasional assist; LOC full/confusion   [] 30%  Bed bound, Extensive disease; Total care; Intake reduced; LOC full/confusion   [] 20%  Bed bound; Extensive disease; Total care; Intake minimal; Drowsy/coma   [] 10%  Bed bound; Extensive disease; Total care; Mouth care only; Drowsy/coma   []  0%   Death     Home med list and hospital medications reviewed in chart as of 5/9/2022     EXAM     Vitals:    05/09/22 1220   BP:    Pulse:    Resp: 27   Temp:    SpO2: 94%       Physical Exam  Vitals reviewed.    Constitutional: Appearance: She is ill-appearing. Interventions: Nasal cannula in place. HENT:      Head: Normocephalic and atraumatic. Nose: Nose normal.   Eyes:      Extraocular Movements: Extraocular movements intact. Pupils: Pupils are equal, round, and reactive to light. Cardiovascular:      Rate and Rhythm: Regular rhythm. Pulses: Normal pulses. Pulmonary:      Comments: Crackles noted bilaterally today; tachypnea noted today. Abdominal:      General: Bowel sounds are normal.      Palpations: Abdomen is soft. Musculoskeletal:      Cervical back: Neck supple. Right lower leg: Edema (1+) present. Left lower leg: Edema (1+) present. Skin:     General: Skin is warm and dry. Coloration: Skin is pale. Comments: Mass noted on right breast   Neurological:      Comments: Oriented x 4    Psychiatric:         Behavior: Behavior normal.         Thought Content:  Thought content normal.         Judgment: Judgment normal.          Current labs in the epic chart reviewed as of 5/9/2022   Review of previous notes, admits, labs, radiology and testing relevant to this consult done in this chart today 5/9/2022      Total time: 84 minutes  >50% of time spent counseling patient at bedside or POA/family member if applicable , reviewing information and discussing care, coordinating with care team  Signed By: Electronically signed by Lugenia Boxer, APRN - CNP on 5/9/2022 at 12:49 PM  Palliative Medicine   [phone]    May 9, 2022

## 2022-05-09 NOTE — ED NOTES
Handoff given to [facility] RN, no further questions at this time.       Neha De La Cruz RN  05/08/22 2017

## 2022-05-09 NOTE — CONSULTS
REASON FOR CONSULTATION/CC: Acute on chronic hypercapnia      Consult at request of DO Bebe for above  PCP: No primary care provider on file. Established Pulmonologist: None     HISTORY OF PRESENT ILLNESS: Pavel Alcantara is a 55y.o. year old female with a history of breast cancer who presents with    Consult secondary to acute on chronic hypercapnic respiratory failure requiring BiPAP overnight and nearly avoiding intubation. Family in the room and the states that he noticed that the patient has been confused for several days mildly at first and then progressing. She denies ever being on oxygen. Significant smoking history. Denies ever having diagnosis of COPD versus asthma      This note may have been  transcribed using [id number] [employer]. Please disregard any translational errors. Assessment:         Plan:      Hospital Day 1     Breast cancer  *Oncology consult    Acute hypoxemic respiratory failure saturation less than 90% on room air  *Currently requiring 11 L nasal cannula  * Wean O2 to sat >90%     Acute hypercapnic respiratory failure on chronic hypercapnic respiratory failure  *Poor response to Narcan. Given 2 doses last night. *Given chronic hypercapnia along with heavy tobacco abuse, suspect obesity hypoventilation syndrome along with COPD. Right pleural effusion with possible pulmonary nodule  *Concern for metastasis. Too small for thoracentesis. Monitor. Abnormal radiograph  *Patient has right-sided atelectasis. Pneumonia is possible with concerns for metastasis. *Currently on vancomycin and cefepime. Sputum culture and MRSA if able. Tobacco abuse  *Significant tobacco history. COPD is possible but no PFTs on chart. *Must quit    Nutrition  - ADULT DIET; Regular; 4 carb choices (60 gm/meal); Low Sodium (2 gm); 1500 ml    Mobility       Access      Code discussion  *Patient is currently listed as full code.   We discussed her current situation and near cough  Cardiovascular: Negative for chest pain  Gastrointestinal: Negative for vomiting, diarrhea    Genitourinary: Negative for hematuria   Musculoskeletal: Negative for arthralgias   Skin: Negative for rash  Neurological: Negative for syncope  Hematological: Negative for adenopathy  Psychiatric/Behavorial: Negative for anxiety    Objective:   PHYSICAL EXAM:  Blood pressure (!) 92/48, pulse 87, temperature 97.7 °F (36.5 °C), temperature source Oral, resp. rate 18, height 5' 6\" (1.676 m), weight 256 lb 9.9 oz (116.4 kg), SpO2 92 %.'    Body mass index is 41.42 kg/m². Gen: No distress. Eyes: PERRL. No sclera icterus. No conjunctival injection. ENT: No discharge. Pharynx clear. External appearance of ears and nose normal.  Neck: Trachea midline. No obvious mass. Resp: No accessory muscle use. No crackles. No wheezes. No rhonchi. CV: Regular rate. Regular rhythm. No murmur or rub. No edema. GI: Non-tender. Non-distended. No hernia. Skin: Warm, dry, normal texture and turgor. No nodule on exposed extremities. Lymph: No cervical LAD. No supraclavicular LAD. M/S: No cyanosis. No clubbing. No joint deformity. Neuro: Moves all four extremities. Psych: Oriented x 3. No anxiety. Awake. Alert. Intact judgement and insight.       Data Reviewed:   LABS:  CBC:   Recent Labs     05/08/22 1645 05/09/22  0609   WBC 8.0 10.8   HGB 12.7 12.4   HCT 41.3  41.0 39.8   MCV 73.7* 74.2*    300     BMP:   Recent Labs     05/08/22 1645 05/09/22  0609    140   K 3.9 3.6   CL 96* 99   CO2 30 29   BUN 7 6*   CREATININE 0.8 0.8     LIVER PROFILE:   Recent Labs     05/08/22 1645   AST 11*   ALT 6*   BILITOT 1.1*   ALKPHOS 93     PT/INR:   Recent Labs     05/08/22 2148   PROTIME 16.2*   INR 1.41*     APTT:   Recent Labs     05/08/22 2148   APTT 27.1     UA:  Recent Labs     05/08/22 1645 05/08/22 1645 05/09/22  0008   COLORU Yellow  --   --    PHUR 5.5   < > 5.0   WBCUA 1  --   --    RBCUA 1  --   -- BACTERIA None Seen  --   --    CLARITYU CLOUDY*  --   --    SPECGRAV 1.005  --   --    LEUKOCYTESUR Negative  --   --    UROBILINOGEN 1.0  --   --    BILIRUBINUR Negative  --   --    BLOODU Negative  --   --    GLUCOSEU Negative  --   --     < > = values in this interval not displayed. Recent Labs     05/08/22  2350   PHART 7.295*   IHF5IZY 79.6*   PO2ART 72.5*            Radiology Review:  Pertinent images / reports were reviewed as a part of this visit. CT Chest w/ contrast: No results found for this or any previous visit. CT Chest w/o contrast: No results found for this or any previous visit. CTPA: No results found for this or any previous visit. CXR PA/LAT: No results found for this or any previous visit. CXR portable: Results for orders placed during the hospital encounter of 05/08/22    XR CHEST PORTABLE    Narrative  EXAMINATION:  ONE XRAY VIEW OF THE CHEST    5/8/2022 5:17 pm    COMPARISON:  None. HISTORY:  ORDERING SYSTEM PROVIDED HISTORY: sob  TECHNOLOGIST PROVIDED HISTORY:  Reason for exam:->sob  Reason for Exam: Leg Swelling    FINDINGS:  HEART/MEDIASTINUM: The cardiac silhouette is enlarged. PLEURA/LUNGS: There is pulmonary vascular congestion/interstitial edema. There is no appreciable pneumothorax. BONES/SOFT TISSUE: No acute abnormality. Impression  Pulmonary vascular congestion/interstitial edema. Cardiomegaly.

## 2022-05-09 NOTE — CONSULTS
Referring Physician: Dr. Anna Marie Junior  Reason for Consultation: \"Suspected CHF\"  Chief Complaint: Short of breath    Subjective:   History of Present Illness:  Kavitha Olsen is a 55 y.o. patient who presented to the hospital with complaints of worsening lower extremity edema and shortness of breath. The patient did not routinely follow with a physician. She notes lower extremity edema for the past several years but did not seek medical attention. The swelling would wax and wane in intensity but would become so severe at times that she could not bend her knees or move her ankles. She would have skin breakdown at times on her lower extremities. However over the last few weeks, she has been having worsening shortness of breath. She states that she will sleep upright in bed or sitting at the edge of her bed secondary to PND/orthopnea. She says the real reason she came to the hospital was that she fell out of bed and her leg was trapped in an awkward position that she thought she may have injured her leg. [facility] was also consulted as she has a large fungating right breast mass. She first noticed 2 \"marble sized\" masses in her right breast approximately 10 years ago. For over 1 year, she says the mass on her chest will open up periodically and bleed but she has never sought medical attention. She was started on IV Lasix and notes improvement in the edema. She has generalized anasarca. Pulmonary was also consulted as the patient had hypercapnic respiratory failure requiring BiPAP there are no family members present bedside but per report the patient had been confused for several days prior to hospitalization. She endorses smoking. Past Medical History:   has a past medical history of Breast cancer ([facility] [address] Surgical History:  Denies prior cardiac surgery or coronary intervention. Social History:   reports that she has been smoking. She has a 64.50 pack-year smoking history.  She has never used smokeless tobacco. She reports current alcohol use of about 1.0 - 3.0 standard drink of alcohol per week. Family History:  Denies premature coronary atherosclerosis. Home Medications:  Were reviewed and are listed in nursing record and/or below  Prior to Admission medications    Not on File        CURRENT Medications:  naloxone (NARCAN) injection 2 mg, PRN  metroNIDAZOLE (FLAGYL) tablet 500 mg, 3 times per day  lidocaine-EPINEPHrine 1 %-1:[id number] injection 20 mL, Once  magnesium sulfate 1000 mg in dextrose 5% 100 mL IVPB, PRN  acetaminophen (TYLENOL) tablet 650 mg, Q6H PRN   Or  acetaminophen (TYLENOL) suppository 650 mg, Q6H PRN  ondansetron (ZOFRAN) injection 4 mg, Q6H PRN  heparin (porcine) injection 5,000 Units, 3 times per day  lisinopril (PRINIVIL;ZESTRIL) tablet 5 mg, Daily  furosemide (LASIX) injection 40 mg, BID  perflutren lipid microspheres (DEFINITY) injection 1.65 mg, ONCE PRN  miconazole (MICOTIN) 2 % powder, BID  nicotine (NICODERM CQ) 21 MG/24HR 1 patch, Daily PRN  cefepime (MAXIPIME) 2000 mg IVPB minibag, Q12H  fluconazole (DIFLUCAN) tablet 200 mg, Daily  sodium chloride flush 0.9 % injection 5-40 mL, 2 times per day  sodium chloride flush 0.9 % injection 5-40 mL, PRN  0.9 % sodium chloride infusion, PRN  multivitamin 1 tablet, Daily  thiamine mononitrate tablet 100 mg, Daily  vancomycin (VANCOCIN) intermittent dosing (placeholder), RX Placeholder  vancomycin 1000 mg IVPB in 250 mL D5W addavial, Q12H  naloxone (NARCAN) injection 0.4 mg, PRN      Allergies:  Patient has no known allergies. Review of Systems:   · Constitutional: no unanticipated weight loss. There's been no change in energy level, sleep pattern, or activity level. No fevers, chills. · Eyes: No visual changes or diplopia. No scleral icterus. · ENT: No Headaches, hearing loss or vertigo. No mouth sores or sore throat. · Cardiovascular: as reviewed in HPI  · Respiratory: No cough or wheezing, no sputum production.  No hemoptysis. · Gastrointestinal: No abdominal pain, appetite loss, blood in stools. No change in bowel or bladder habits. · Genitourinary: No dysuria, trouble voiding, or hematuria. · Musculoskeletal:  No gait disturbance, no joint complaints. · Integumentary: Right breast mass with redness. · Neurological: No headache, diplopia, change in muscle strength, numbness or tingling. · Psychiatric: No anxiety or depression. · Endocrine: No temperature intolerance. No excessive thirst, fluid intake, or urination. No tremor. · Hematologic/Lymphatic: No abnormal bruising or bleeding, blood clots or swollen lymph nodes. · Allergic/Immunologic: No nasal congestion or hives. Objective:   PHYSICAL EXAM:    Vitals:    05/09/22 1220   BP: 117/73   Pulse: 87   Resp: 27   Temp: 97.7   SpO2: 94%    Weight: 256 lb 9.9 oz (116.4 kg)       General Appearance:  Alert, cooperative, no respiratory distress, appears stated age. Head:  Normocephalic, without obvious abnormality, atraumatic. Eyes:  Pupils equal and round. No scleral icterus. Mouth: Moist mucosa, no pharyngeal erythema. Nose: Nares normal. No drainage or sinus tenderness. Neck: Supple, symmetrical, trachea midline. No adenopathy. No tenderness/mass/nodules. No carotid bruit or elevated JVD. Lungs:   Respirations unlabored. Bibasilar crackles. Chest Wall:  No tenderness or deformity. Heart:  Regular rate. S1/S2 normal. No murmur, rub, or gallop. Abdomen:   Soft, non-tender, bowel sounds active. Musculoskeletal: No muscle wasting or digital clubbing. Extremities: Extremities normal, atraumatic. No cyanosis. 3+ BLE edema.  + Anasarca   Pulses: 2+ radial and carotid pulses, symmetric. Skin:  Fungating right breast mass with associated erythema. Pysch: Normal mood and affect. Alert and oriented x 4. Poor insight. Neurologic: Normal gross motor. Follows commands.        Labs     CBC:   Lab Results   Component Value Date    WBC 10.8 05/09/2022    RBC 5.37 05/09/2022    HGB 12.4 05/09/2022    HCT 39.8 05/09/2022    MCV 74.2 05/09/2022    RDW 19.1 05/09/2022     05/09/2022     CMP:  Lab Results   Component Value Date     05/09/2022    K 3.6 05/09/2022    K 3.9 05/08/2022    CL 99 05/09/2022    CO2 29 05/09/2022    BUN 6 05/09/2022    CREATININE 0.8 05/09/2022    GFRAA >60 05/09/2022    GFRAA >60 01/22/2011    AGRATIO 1.0 05/08/2022    LABGLOM >60 05/09/2022    GLUCOSE 144 05/09/2022    PROT 6.9 05/08/2022    PROT 8.1 01/22/2011    CALCIUM 8.9 05/09/2022    BILITOT 1.1 05/08/2022    ALKPHOS 93 05/08/2022    AST 11 05/08/2022    ALT 6 05/08/2022     PT/INR:  No results found for: PTINR  HgBA1c:  Lab Results   Component Value Date    LABA1C 7.0 05/09/2022     Lab Results   Component Value Date    TROPONINI 0.01 05/08/2022       Cardiac Data     EKG: Personally interpreted. 5/8/2022. Sinus tachycardia. Low voltage QRS complex. Nonspecific T wave abnormality. Echo: None on file. Telemetry: Personally interpreted. Sinus. Assessment and Plan   1) Acute on chronic systolic heart failure. EF unknown. NYHA IV. Patient appears hypervolemic. Continue IV Lasix. Continue ACE-I. We will start beta-blocker when better compensated. Will add Aldactone if EF <40%. Will readdress SGLT2i as an outpatient. 2) Acute on chronic hypoxic and hypercapnic respiratory failure. Pulmonary consulted. Diurese as tolerated. Wean O2 as tolerated. 3) Breast cancer. [facility] consulted. 4) CAD risk equivalent with type 2 diabetes mellitus/aortic atherosclerosis. Diabetes management per primary team.  We will start statin therapy. 5) Nicotine Addiction. Encouraged smoking cessation but patient is in the contemplative stage. 6) Morbid obesity. BMI 41. Encourage weight loss. Overall, the problems requiring hospitalization are high in severity. Thank you for allowing us to participate in the care of Briana Garvey.     Lin Valente Chuck Neal MD  [facility]  5/9/2022 12:47 PM

## 2022-05-09 NOTE — CONSULTS
[address]                                  CONSULTATION    PATIENT NAME: Tara Leiva                    :        1965  MED REC NO:   [id number]                          ROOM:       [id number]  ACCOUNT NO:   [de-identified]                           ADMIT DATE: 2022  PROVIDER:     Karma Galloway MD    ONCOLOGY CONSULTATION    CONSULT DATE:  2022    REASON FOR CONSULTATION:  Locally advanced breast cancer. CONSULTING PROVIDER:  Lesvia Lino DO    HISTORY OF PRESENT ILLNESS:  The patient is a 59-year-old female who has  not seen a doctor in many years, who comes in to the hospital with  increasing lower extremity edema and shortness of breath. She had  multiple issues. She had increasing lower extremity edema. An echo has  been ordered and a Doppler has been ordered. She also had a very large  locally advanced right-sided breast mass that was fungating and quite  red. She states that this has been there for years and she has ignored  it and it has gradually gotten bigger. Oncology was consulted for that  purpose. She did have a chest and abdomen and pelvis CT. The chest CT  showed the large fungating right breast mass. There was also a  nonspecific left adrenal nodule. There was hepatic steatosis as well. There was also bilateral axillary and lymph node enlargement. Oncology  was consulted. PAST MEDICAL HISTORY:  None. PAST SURGICAL HISTORY:  None. ALLERGIES:  She has no known drug allergies. MEDICATIONS:  She was on no medicines prior to this admission. SOCIAL HISTORY:  She is . She smokes one pack per day and has  done so for 35 years. She drinks two to six beers per day. FAMILY HISTORY:  There is a grandparent with breast cancer.     REVIEW OF SYSTEMS:  She denies any recent fever, chills, sweats, nausea,  vomiting, abdominal pain, chest pain, headaches, any new bone aches,  dysphagia, odynophagia, diarrhea, constipation, hemoptysis, hematemesis,  change in vision/hearing/smell/taste, weakness, neuropathy, skin rashes,  productive cough, urinary or bowel prolapse or incontinence, petechiae,  purpura, skin rashes, vaginal bleeding, pruritus, hallucinations, nasal  congestion or drainage, seizures, strokes, syncope, depression, anxiety,  suicidal ideations, melena, or hematochezia. She has mild to moderate  fatigue and mild to moderate dyspnea on exertion. She has bilateral  lower extremity edema and the breast mass. Her 10-system review of  systems is otherwise negative. PHYSICAL EXAMINATION:  VITAL SIGNS:  She is afebrile with normal vital signs. GENERAL:  She is in no acute distress. HEENT:  Her pupils are round and reactive to light and accommodation. Extraocular muscles are intact. NECK:  She has no jugular venous distention. No thyromegaly. Oropharynx is clear. She has no carotid bruits. She has bilateral  palpable axillary lymphadenopathy that are subtle. BREASTS:  She has a very large breast mass that is fungating and red and  friable. HEART:  Regular rate and rhythm. LUNGS:  Clear to auscultation bilaterally. ABDOMEN:  Nondistended, nontender with bowel sounds x4. EXTREMITIES:  She has 2+ lower extremity edema bilaterally. NEUROLOGIC:  Exam is nonfocal.    LABORATORY DATA:  Her white blood cell count is 10.8, hemoglobin 12.4,  platelets 663. ASSESSMENT AND PLAN:  1. Locally advanced right-sided breast cancer. She has ignored this  mass for years. She will need an outpatient PET/CT to evaluate some  indeterminate lymphadenopathy and an adrenal lesion. I will get a bone  scan. If her disease is localized to the breast, the standard of care  would be neoadjuvant chemotherapy followed by possible surgery down the  road if she has a good response. She is not sure if she wants to go  through all of that.   I will consult Palliative Care to help talk her  through this. I will also consult Surgery for biopsy. 2.  Edema. Dopplers have been ordered. An echo has also been ordered. Thank you for the consultation. I will follow closely.         Suri Callahan MD    D: 05/09/2022 9:07:28       T: 05/09/2022 13:29:15     [email or web address]  Job#: [id number]     Doc#: [id number]    CC:

## 2022-05-09 NOTE — CONSULTS
Clinical Pharmacy Note  Vancomycin Consult    Yoel Agarwal is a 55 y.o. female ordered Vancomycin for cellulitis; consult received from Dr. Karen Gudino to manage therapy. Also receiving cefepime, Flagyl, Diflucan. Patient Active Problem List   Diagnosis    Suspected CHF (congestive heart failure)    Malignancy (HCC)    Leg swelling    Cellulitis       Allergies:  Patient has no known allergies. Temp max:  Temp (24hrs), Av.7 °F (36.5 °C), Min:97.2 °F (36.2 °C), Max:98.2 °F (36.8 °C)      Recent Labs     22  1645   WBC 8.0       Recent Labs     22  1645   BUN 7   CREATININE 0.8         Intake/Output Summary (Last 24 hours) at 2022 2245  Last data filed at 2022 2215  Gross per 24 hour   Intake --   Output 3900 ml   Net -3900 ml         Ht Readings from Last 1 Encounters:   22 5' 6\" (1.676 m)        Wt Readings from Last 1 Encounters:   22 256 lb 9.9 oz (116.4 kg)         Estimated Creatinine Clearance: 103 mL/min (based on SCr of 0.8 mg/dL). Assessment/Plan:  Vancomycin 1000 mg IV every 12 hours ordered. Regimen projects a trough level of 10-15 mg/L. Level ordered for 5/10/22 @0800. Thank you for the consult.    Demetria Landers, PharmD

## 2022-05-09 NOTE — PROGRESS NOTES
Pt unable to tolerate echo due to pain when pressure is applied around breast area. Echo states they can attempt again tomorrow. Pt also refusing bone scan at this time. MD notified.

## 2022-05-09 NOTE — CARE COORDINATION
INITIAL CASE MANAGEMENT ASSESSMENT    Reviewed chart, met with patient to assess possible discharge needs. Explained Case Management role/services. Living Situation: Prior to medical admission, patient lived in own home with spouse and sons. There are 2-3 CECE the home. Once inside, patient resided on first floor. Patient states she has some difficulty accessing the property and receives assistance from family as needed. ADLs: largely independent, PRN assist as needed from family     DME: none; will follow for any therapy recs. PT/OT Recs: pending       Active Services: none prior to admission     Transportation: Patient drives occasionally, but mostly receives help with transportation by family. Medications: [facility] on [address] in [place]    PCP: initiated care with Sanju Kohler in [place], patient states her first appointment is June 16, 2022      HD/PD: N/A    PLAN/COMMENTS: Patient plans to return home with family and follow up with specialists outpatient, as directed. PT and OT ordered; will follow for recommendations. Patient wearing oxygen during bedside assessment, will follow for possible O2 needs. SW/CM provided contact information for patient or family to call with any questions. SW/CM will follow and assist as needed.     Electronically signed by Jim Naqvi RN on 5/9/2022 at 2:51 PM

## 2022-05-10 LAB
ANION GAP SERPL CALCULATED.3IONS-SCNC: 8 MMOL/L (ref 3–16)
BASOPHILS ABSOLUTE: 0.1 K/UL (ref 0–0.2)
BASOPHILS RELATIVE PERCENT: 1.3 %
BUN BLDV-MCNC: 8 MG/DL (ref 7–20)
CALCIUM SERPL-MCNC: 8.6 MG/DL (ref 8.3–10.6)
CHLORIDE BLD-SCNC: 99 MMOL/L (ref 99–110)
CO2: 33 MMOL/L (ref 21–32)
CREAT SERPL-MCNC: 0.9 MG/DL (ref 0.6–1.1)
EOSINOPHILS ABSOLUTE: 0.1 K/UL (ref 0–0.6)
EOSINOPHILS RELATIVE PERCENT: 1.1 %
GFR AFRICAN AMERICAN: >60
GFR NON-AFRICAN AMERICAN: >60
GLUCOSE BLD-MCNC: 120 MG/DL (ref 70–99)
HAV IGM SER IA-ACNC: NORMAL
HCT VFR BLD CALC: 37.7 % (ref 36–48)
HEMOGLOBIN: 11.5 G/DL (ref 12–16)
HEPATITIS B CORE IGM ANTIBODY: NORMAL
HEPATITIS B SURFACE ANTIGEN INTERPRETATION: NORMAL
HEPATITIS C ANTIBODY INTERPRETATION: NORMAL
HIV AG/AB: NORMAL
HIV ANTIGEN: NORMAL
HIV-1 ANTIBODY: NORMAL
HIV-2 AB: NORMAL
LYMPHOCYTES ABSOLUTE: 0.6 K/UL (ref 1–5.1)
LYMPHOCYTES RELATIVE PERCENT: 9 %
MAGNESIUM: 2 MG/DL (ref 1.8–2.4)
MCH RBC QN AUTO: 22.9 PG (ref 26–34)
MCHC RBC AUTO-ENTMCNC: 30.5 G/DL (ref 31–36)
MCV RBC AUTO: 75 FL (ref 80–100)
MONOCYTES ABSOLUTE: 0.7 K/UL (ref 0–1.3)
MONOCYTES RELATIVE PERCENT: 10.1 %
MRSA SCREEN RT-PCR: NORMAL
NEUTROPHILS ABSOLUTE: 5.6 K/UL (ref 1.7–7.7)
NEUTROPHILS RELATIVE PERCENT: 78.5 %
PDW BLD-RTO: 19.1 % (ref 12.4–15.4)
PLATELET # BLD: 232 K/UL (ref 135–450)
PMV BLD AUTO: 7.4 FL (ref 5–10.5)
POTASSIUM SERPL-SCNC: 3.8 MMOL/L (ref 3.5–5.1)
PROCALCITONIN: 0.1 NG/ML (ref 0–0.15)
RBC # BLD: 5.02 M/UL (ref 4–5.2)
SODIUM BLD-SCNC: 140 MMOL/L (ref 136–145)
VANCOMYCIN TROUGH: 14.1 UG/ML (ref 10–20)
WBC # BLD: 7.1 K/UL (ref 4–11)

## 2022-05-10 PROCEDURE — 6370000000 HC RX 637 (ALT 250 FOR IP): Performed by: INTERNAL MEDICINE

## 2022-05-10 PROCEDURE — 87390 HIV-1 AG IA: CPT

## 2022-05-10 PROCEDURE — 80048 BASIC METABOLIC PNL TOTAL CA: CPT

## 2022-05-10 PROCEDURE — 86702 HIV-2 ANTIBODY: CPT

## 2022-05-10 PROCEDURE — 99233 SBSQ HOSP IP/OBS HIGH 50: CPT | Performed by: INTERNAL MEDICINE

## 2022-05-10 PROCEDURE — 6370000000 HC RX 637 (ALT 250 FOR IP): Performed by: STUDENT IN AN ORGANIZED HEALTH CARE EDUCATION/TRAINING PROGRAM

## 2022-05-10 PROCEDURE — 84145 PROCALCITONIN (PCT): CPT

## 2022-05-10 PROCEDURE — 80202 ASSAY OF VANCOMYCIN: CPT

## 2022-05-10 PROCEDURE — 94761 N-INVAS EAR/PLS OXIMETRY MLT: CPT

## 2022-05-10 PROCEDURE — 87070 CULTURE OTHR SPECIMN AEROBIC: CPT

## 2022-05-10 PROCEDURE — 97530 THERAPEUTIC ACTIVITIES: CPT

## 2022-05-10 PROCEDURE — 83735 ASSAY OF MAGNESIUM: CPT

## 2022-05-10 PROCEDURE — 86701 HIV-1ANTIBODY: CPT

## 2022-05-10 PROCEDURE — 6360000002 HC RX W HCPCS: Performed by: INTERNAL MEDICINE

## 2022-05-10 PROCEDURE — 6360000002 HC RX W HCPCS: Performed by: STUDENT IN AN ORGANIZED HEALTH CARE EDUCATION/TRAINING PROGRAM

## 2022-05-10 PROCEDURE — 36415 COLL VENOUS BLD VENIPUNCTURE: CPT

## 2022-05-10 PROCEDURE — 87205 SMEAR GRAM STAIN: CPT

## 2022-05-10 PROCEDURE — 2060000000 HC ICU INTERMEDIATE R&B

## 2022-05-10 PROCEDURE — 87181 SC STD AGAR DILUTION PER AGT: CPT

## 2022-05-10 PROCEDURE — 2700000000 HC OXYGEN THERAPY PER DAY

## 2022-05-10 PROCEDURE — 80074 ACUTE HEPATITIS PANEL: CPT

## 2022-05-10 PROCEDURE — 97165 OT EVAL LOW COMPLEX 30 MIN: CPT

## 2022-05-10 PROCEDURE — 85025 COMPLETE CBC W/AUTO DIFF WBC: CPT

## 2022-05-10 PROCEDURE — 2580000003 HC RX 258: Performed by: STUDENT IN AN ORGANIZED HEALTH CARE EDUCATION/TRAINING PROGRAM

## 2022-05-10 PROCEDURE — 87077 CULTURE AEROBIC IDENTIFY: CPT

## 2022-05-10 PROCEDURE — 87186 SC STD MICRODIL/AGAR DIL: CPT

## 2022-05-10 RX ORDER — METOPROLOL SUCCINATE 25 MG/1
25 TABLET, EXTENDED RELEASE ORAL DAILY
Status: DISCONTINUED | OUTPATIENT
Start: 2022-05-11 | End: 2022-05-18 | Stop reason: HOSPADM

## 2022-05-10 RX ADMIN — IRON SUCROSE 200 MG: 20 INJECTION, SOLUTION INTRAVENOUS at 15:09

## 2022-05-10 RX ADMIN — HEPARIN SODIUM 5000 UNITS: 5000 INJECTION INTRAVENOUS; SUBCUTANEOUS at 21:27

## 2022-05-10 RX ADMIN — HEPARIN SODIUM 5000 UNITS: 5000 INJECTION INTRAVENOUS; SUBCUTANEOUS at 13:57

## 2022-05-10 RX ADMIN — HEPARIN SODIUM 5000 UNITS: 5000 INJECTION INTRAVENOUS; SUBCUTANEOUS at 05:40

## 2022-05-10 RX ADMIN — Medication 10 ML: at 07:52

## 2022-05-10 RX ADMIN — MICONAZOLE NITRATE: 2 POWDER TOPICAL at 08:47

## 2022-05-10 RX ADMIN — Medication 10 ML: at 20:42

## 2022-05-10 RX ADMIN — CEFEPIME HYDROCHLORIDE 2000 MG: 2 INJECTION, POWDER, FOR SOLUTION INTRAVENOUS at 20:41

## 2022-05-10 RX ADMIN — ATORVASTATIN CALCIUM 10 MG: 10 TABLET, FILM COATED ORAL at 08:46

## 2022-05-10 RX ADMIN — LISINOPRIL 5 MG: 5 TABLET ORAL at 08:46

## 2022-05-10 RX ADMIN — METRONIDAZOLE 500 MG: 500 TABLET ORAL at 05:40

## 2022-05-10 RX ADMIN — THERA TABS 1 TABLET: TAB at 08:46

## 2022-05-10 RX ADMIN — FLUCONAZOLE 200 MG: 100 TABLET ORAL at 20:41

## 2022-05-10 RX ADMIN — METRONIDAZOLE 500 MG: 500 TABLET ORAL at 21:27

## 2022-05-10 RX ADMIN — VANCOMYCIN HYDROCHLORIDE 1000 MG: 1 INJECTION, POWDER, LYOPHILIZED, FOR SOLUTION INTRAVENOUS at 08:51

## 2022-05-10 RX ADMIN — SODIUM CHLORIDE 25 ML: 9 INJECTION, SOLUTION INTRAVENOUS at 20:40

## 2022-05-10 RX ADMIN — FUROSEMIDE 40 MG: 10 INJECTION, SOLUTION INTRAMUSCULAR; INTRAVENOUS at 17:22

## 2022-05-10 RX ADMIN — FUROSEMIDE 40 MG: 10 INJECTION, SOLUTION INTRAMUSCULAR; INTRAVENOUS at 08:46

## 2022-05-10 RX ADMIN — METRONIDAZOLE 500 MG: 500 TABLET ORAL at 13:57

## 2022-05-10 RX ADMIN — MICONAZOLE NITRATE: 2 POWDER TOPICAL at 20:41

## 2022-05-10 RX ADMIN — CEFEPIME HYDROCHLORIDE 2000 MG: 2 INJECTION, POWDER, FOR SOLUTION INTRAVENOUS at 07:54

## 2022-05-10 RX ADMIN — THIAMINE HCL TAB 100 MG 100 MG: 100 TAB at 08:46

## 2022-05-10 NOTE — PROGRESS NOTES
Occupational Therapy  Facility/Department: [address] PROGRESSIVE CARE  Occupational Therapy Initial Assessment and Tentative D/C      Name: Yoel Agarwal  : 1965  MRN: [id number]  Date of Service: 5/10/2022    Discharge Recommendations: Yoel Agarwal scored a 21/24 on the AM-PAC ADL Inpatient form. Current research shows that an AM-PAC score of 18 or greater is typically associated with a discharge to the patient's home setting. If patient discharges prior to next session this note will serve as a discharge summary. Please see below for the latest assessment towards goals. Continue to assess pending progress,Home with assist PRN  OT Equipment Recommendations  Equipment Needed: Yes  Mobility Devices: ADL Assistive Devices  ADL Assistive Devices: Shower Chair with back (due to decreased endurance and SOB)       Patient Diagnosis(es): The primary encounter diagnosis was New onset of congestive heart failure ([facility] [address] Diagnoses of Breast mass, right, Cellulitis, unspecified cellulitis site, and Acute respiratory failure with hypoxia ([address] were also pertinent to this visit. Past Medical History:  has a past medical history of Breast cancer ([address] Past Surgical History:  has no past surgical history on file. Assessment   Performance deficits / Impairments: Decreased functional mobility ; Decreased balance;Decreased ADL status; Decreased endurance;Decreased high-level IADLs  Assessment: Yoel Agarwal is a 55 y.o. female who presents to the emergency department by private vehicle complaining of lower extremity swelling. Patient states for the past year she has had intermittent bilateral lower extremity swelling. States swelling has been worse over the past couple weeks. Patient states \"I ate a lot of salt recently\", believes this is the cause. She also complaining of wound on her right shin. She cut herself shaving about a week ago.   Wound is still open and she has developed blisters around region. Over the past couple days she has developed shortness of breath. States she becomes easily winded when overexcited, talking too long or exerting herself. Patient sleeps elevated on pillows, denies any increase in elevation. She has had a mild occasional cough, denies hemoptysis or production. Denies any chest pain, lightheadedness, fevers, chills, palpitations. PTA pt from home with  where pt was Ind with mobility and ADLs. Pt currently functioning below baseline completing mobility and transfers with supervision/SBA. Pt able to ambulate short distances in room with no LOB. Pt's SpO2 >90% throughout on 10L O2. Anticipate pt needing up to supervision for ADLs. Pt will benefit from skilled OT services at this time. Anticipate pt safe to return home with improved O2 and shower chair due to decreased endurance. Prognosis: Good  Decision Making: Low Complexity  Exam: see above  REQUIRES OT FOLLOW-UP: Yes  Activity Tolerance  Activity Tolerance: Patient Tolerated treatment well;Treatment limited secondary to medical complications (free text)  Activity Tolerance Comments: increased O2 demand        Plan   Plan  Times per Week: 3-5x  Current Treatment Recommendations: Functional mobility training,Balance training,Strengthening,Endurance training,Patient/Caregiver education & training,Safety education & training,Self-Care / ADL,Equipment evaluation, education, & procurement     Restrictions  Restrictions/Precautions  Restrictions/Precautions: Fall Risk  Position Activity Restriction  Other position/activity restrictions: 10L O2    Subjective   General  Chart Reviewed: Yes  Patient assessed for rehabilitation services?: Yes  Additional Pertinent Hx: per ED note, Esther Santana is a 55 y.o. female who presents to the emergency department by private vehicle complaining of lower extremity swelling. Patient states for the past year she has had intermittent bilateral lower extremity swelling.   States swelling has been worse over the past couple weeks. Patient states \"I ate a lot of salt recently\", believes this is the cause. She also complaining of wound on her right shin. She cut herself shaving about a week ago. Wound is still open and she has developed blisters around region. Over the past couple days she has developed shortness of breath. States she becomes easily winded when overexcited, talking too long or exerting herself. Patient sleeps elevated on pillows, denies any increase in elevation. She has had a mild occasional cough, denies hemoptysis or production. Denies any chest pain, lightheadedness, fevers, chills, palpitations. Family / Caregiver Present: No  Referring Practitioner: Micah Way MD  Subjective  Subjective: Pt agreeable to OT evaluation. Pt reports no pain. Pt on 10L O2 throughout session. Social/Functional History  Social/Functional History  Lives With: Spouse  Type of Home: House  Home Layout: Two level,Able to Live on Main level with bedroom/bathroom  Home Access: Stairs to enter without rails  Entrance Stairs - Number of Steps: 3-4  Bathroom Shower/Tub: Tub/Shower unit  Bathroom Toilet: Standard  Home Equipment:  (No DME)  ADL Assistance: Independent  Ambulation Assistance: Independent  Transfer Assistance: Independent  Active : Yes  Additional Comments: No Falls       Objective   Pulse: 95  Heart Rate Source: Monitor  BP: 116/62  BP Location: Right upper arm  MAP (Calculated): 80  Resp: 14  SpO2: 92 %  O2 Device: High flow nasal cannula  Vision Exceptions: Wears glasses at all times  Hearing: Within functional limits          Safety Devices  Type of Devices:  All fall risk precautions in place;Call light within reach;Gait belt;Left in chair  Restraints  Restraints Initially in Place: No  Bed Mobility Training  Bed Mobility Training: No  Balance  Sitting: Intact  Standing: Impaired  Standing - Static: Good  Standing - Dynamic: Good;Fair  Transfer Training  Transfer Training: Yes  Overall Level of Assistance: Supervision  Sit to Stand: Supervision  Stand to Sit: Supervision  Bed to Chair: Supervision  Gait  Overall Level of Assistance: Stand-by assistance (no LOB; no reports of light headedness and dizziness; pt's SpO2 >90% throughout)  Distance (ft): 30 Feet (20 x2 sets of marching in place)     AROM: Within functional limits  PROM: Within functional limits  Strength:  Within functional limits  Coordination: Within functional limits  Tone: Normal  Sensation: Intact  ADL  Additional Comments: Anticipate pt needing up to supervision for ADLs based on ROM, strength, and balance                 Cognition  Overall Cognitive Status: WFL                  Education Given To: Patient  Education Provided: Role of Therapy;Plan of Care;Transfer Training  Education Method: Demonstration;Verbal  Barriers to Learning: None  Education Outcome: Verbalized understanding;Demonstrated understanding          AM-PAC Score        AM-PAC Inpatient Daily Activity Raw Score: 21 (05/10/22 0730)  AM-PAC Inpatient ADL T-Scale Score : 44.27 (05/10/22 0730)  ADL Inpatient CMS 0-100% Score: 32.79 (05/10/22 0730)  ADL Inpatient CMS G-Code Modifier : Jazmin Jo (05/10/22 0730)    Goals  Short Term Goals  Time Frame for Short term goals: prior to D/C  Short Term Goal 1: complete functional mobility and transfers Ind  Short Term Goal 2: complete bathing and dressing Ind  Short Term Goal 3: complete toileting Ind  Short Term Goal 4: complete grooming in stance at sink Ind  Long Term Goals  Time Frame for Long term goals : STG=LTG  Patient Goals   Patient goals : return home       Therapy Time   Individual Concurrent Group Co-treatment   Time In 0700         Time Out 0725         Minutes 25         Timed Code Treatment Minutes: 10 Minutes (15 minute eval)       Linh Quintanilla OTR/L

## 2022-05-10 NOTE — PLAN OF CARE
Problem: Discharge Planning  Goal: Discharge to home or other facility with appropriate resources  5/9/2022 2353 by Oliver Yepez RN  Outcome: Progressing  5/9/2022 1716 by Amita Coreas RN  Outcome: Progressing     Problem: Safety - Adult  Goal: Free from fall injury  5/9/2022 2353 by Oliver Yepez RN  Outcome: Progressing  5/9/2022 1716 by Amita Coreas RN  Outcome: Progressing     Problem: ABCDS Injury Assessment  Goal: Absence of physical injury  5/9/2022 2353 by Oliver Yepez RN  Outcome: Progressing  5/9/2022 1716 by Amita Coreas RN  Outcome: Progressing     Problem: Skin/Tissue Integrity  Goal: Absence of new skin breakdown  Description: 1. Monitor for areas of redness and/or skin breakdown  2. Assess vascular access sites hourly  3. Every 4-6 hours minimum:  Change oxygen saturation probe site  4. Every 4-6 hours:  If on nasal continuous positive airway pressure, respiratory therapy assess nares and determine need for appliance change or resting period. 5/9/2022 2353 by Oliver Yepez RN  Outcome: Progressing  5/9/2022 1716 by Amita Coreas RN  Outcome: Progressing     Problem: Neurosensory - Adult  Goal: Achieves stable or improved neurological status  5/9/2022 2353 by Oliver Yepez RN  Outcome: Progressing  5/9/2022 1716 by Amita Coreas RN  Outcome: Progressing  Goal: Absence of seizures  5/9/2022 2353 by Oliver Yepez RN  Outcome: Progressing  Flowsheets (Taken 5/9/2022 2030)  Absence of seizures:   Monitor for seizure activity.   If seizure occurs, document type and location of movements and any associated apnea   If seizure occurs, turn head to side and suction secretions as needed  5/9/2022 1716 by Amita Coreas RN  Outcome: Progressing  Goal: Remains free of injury related to seizures activity  5/9/2022 2353 by Oliver Yepez RN  Outcome: Progressing  5/9/2022 1716 by Amita Coreas RN  Outcome: Progressing  Goal: Achieves maximal functionality and self care  5/9/2022 2353 by Oliver Yepez RN  Outcome: Progressing  5/9/2022 1716 by Soraya Templeton RN  Outcome: Progressing     Problem: Respiratory - Adult  Goal: Achieves optimal ventilation and oxygenation  5/9/2022 2353 by Woody Burks RN  Outcome: Progressing  5/9/2022 1716 by Soraya Templeton RN  Outcome: Progressing     Problem: Cardiovascular - Adult  Goal: Maintains optimal cardiac output and hemodynamic stability  5/9/2022 2353 by Woody Burks RN  Outcome: Progressing  Flowsheets (Taken 5/9/2022 2030)  Maintains optimal cardiac output and hemodynamic stability: Monitor blood pressure and heart rate  5/9/2022 1716 by Soraya Templeton RN  Outcome: Progressing  Flowsheets (Taken 5/9/2022 1046 by Lizzie Ferrara)  Maintains optimal cardiac output and hemodynamic stability:   Monitor blood pressure and heart rate   Assess for signs of decreased cardiac output  Goal: Absence of cardiac dysrhythmias or at baseline  5/9/2022 2353 by Woody Burks RN  Outcome: Progressing  Flowsheets (Taken 5/9/2022 2030)  Absence of cardiac dysrhythmias or at baseline: Monitor cardiac rate and rhythm  5/9/2022 1716 by Soraya Templeton RN  Outcome: Progressing     Problem: Skin/Tissue Integrity - Adult  Goal: Skin integrity remains intact  5/9/2022 2353 by Woody Burks RN  Outcome: Progressing  Flowsheets (Taken 5/9/2022 2030)  Skin Integrity Remains Intact: Monitor for areas of redness and/or skin breakdown  5/9/2022 1716 by Soraya Templeton RN  Outcome: Progressing  Goal: Incisions, wounds, or drain sites healing without S/S of infection  5/9/2022 2353 by Woody Burks RN  Outcome: Progressing  Flowsheets (Taken 5/9/2022 2030)  Incisions, Wounds, or Drain Sites Healing Without Sign and Symptoms of Infection:   ADMISSION and DAILY: Assess and document risk factors for pressure ulcer development   TWICE DAILY: Assess and document skin integrity   TWICE DAILY: Assess and document dressing/incision, wound bed, drain sites and surrounding tissue   Implement wound care per orders   Initiate pressure ulcer prevention bundle as indicated  5/9/2022 1716 by Michael Adair RN  Outcome: Progressing  Goal: Oral mucous membranes remain intact  5/9/2022 2353 by Ronda Martin RN  Outcome: Progressing  5/9/2022 1716 by Michael Adair RN  Outcome: Progressing     Problem: Skin/Tissue Integrity - Adult  Goal: Incisions, wounds, or drain sites healing without S/S of infection  5/9/2022 2353 by Ronda Martin RN  Outcome: Progressing  Flowsheets (Taken 5/9/2022 2030)  Incisions, Wounds, or Drain Sites Healing Without Sign and Symptoms of Infection:   ADMISSION and DAILY: Assess and document risk factors for pressure ulcer development   TWICE DAILY: Assess and document skin integrity   TWICE DAILY: Assess and document dressing/incision, wound bed, drain sites and surrounding tissue   Implement wound care per orders   Initiate pressure ulcer prevention bundle as indicated  5/9/2022 1716 by Michael Adair RN  Outcome: Progressing     Problem: Skin/Tissue Integrity - Adult  Goal: Oral mucous membranes remain intact  5/9/2022 2353 by Ronda Martin RN  Outcome: Progressing  5/9/2022 1716 by Michael Adair RN  Outcome: Progressing     Problem: Musculoskeletal - Adult  Goal: Return mobility to safest level of function  5/9/2022 2353 by Ronda Martin RN  Outcome: Progressing  Flowsheets (Taken 5/9/2022 2030)  Return Mobility to Safest Level of Function:   Assess patient stability and activity tolerance for standing, transferring and ambulating with or without assistive devices   Instruct patient/family in ordered activity level  5/9/2022 1716 by Michael Adair RN  Outcome: Progressing  Goal: Maintain proper alignment of affected body part  5/9/2022 2353 by Ronda Martin RN  Outcome: Progressing  5/9/2022 1716 by Michael Adair RN  Outcome: Progressing  Goal: Return ADL status to a safe level of function  5/9/2022 2353 by Ronda Martin RN  Outcome: Progressing  5/9/2022 1716 by Michael Adair RN  Outcome: Progressing     Problem: Musculoskeletal - Adult  Goal: Maintain proper alignment of affected body part  5/9/2022 2353 by Meliton Ramirez RN  Outcome: Progressing  5/9/2022 1716 by Halle Almazan RN  Outcome: Progressing     Problem: Gastrointestinal - Adult  Goal: Minimal or absence of nausea and vomiting  5/9/2022 2353 by Meliton Ramirez RN  Outcome: Progressing  Flowsheets (Taken 5/9/2022 2030)  Minimal or absence of nausea and vomiting:   Administer ordered antiemetic medications as needed   Provide nonpharmacologic comfort measures as appropriate  5/9/2022 1716 by Halle Almazan RN  Outcome: Progressing  Goal: Maintains or returns to baseline bowel function  5/9/2022 2353 by Meliton Ramirez RN  Outcome: Progressing  Flowsheets (Taken 5/9/2022 2030)  Maintains or returns to baseline bowel function:   Assess bowel function   Encourage mobilization and activity   Administer ordered medications as needed  5/9/2022 1716 by Halle Almazan RN  Outcome: Progressing  Goal: Maintains adequate nutritional intake  5/9/2022 2353 by Meliton Ramirez RN  Outcome: Progressing  Flowsheets (Taken 5/9/2022 2030)  Maintains adequate nutritional intake:   Monitor percentage of each meal consumed   Monitor intake and output, weight and lab values  5/9/2022 1716 by Halle Almazan RN  Outcome: Progressing  Goal: Establish and maintain optimal ostomy function  Outcome: Progressing     Problem: Gastrointestinal - Adult  Goal: Maintains or returns to baseline bowel function  5/9/2022 2353 by Meliton Ramirez RN  Outcome: Progressing  Flowsheets (Taken 5/9/2022 2030)  Maintains or returns to baseline bowel function:   Assess bowel function   Encourage mobilization and activity   Administer ordered medications as needed  5/9/2022 1716 by Halle Almazan RN  Outcome: Progressing     Problem: Genitourinary - Adult  Goal: Absence of urinary retention  5/9/2022 2353 by Meliton Ramirez RN  Outcome: Progressing  Flowsheets (Taken 5/9/2022 2030)  Absence of urinary retention:   Assess patients ability to void and empty bladder   Monitor intake/output and perform bladder scan as needed  5/9/2022 1716 by Symone Ryan RN  Outcome: Progressing  Goal: Urinary catheter remains patent  5/9/2022 2353 by Rosemary Branch RN  Outcome: Progressing  5/9/2022 1716 by Symone Ryan RN  Outcome: Progressing     Problem: Infection - Adult  Goal: Absence of infection at discharge  5/9/2022 2353 by Rosemary Branch RN  Outcome: Progressing  5/9/2022 1716 by Symone Ryan RN  Outcome: Progressing  Goal: Absence of infection during hospitalization  5/9/2022 2353 by Rosemary Branch RN  Outcome: Progressing  5/9/2022 1716 by Symone Ryan RN  Outcome: Progressing  Goal: Absence of fever/infection during anticipated neutropenic period  5/9/2022 2353 by Rosemary Branch RN  Outcome: Progressing  5/9/2022 1716 by Symone Ryan RN  Outcome: Progressing     Problem: Metabolic/Fluid and Electrolytes - Adult  Goal: Electrolytes maintained within normal limits  5/9/2022 2353 by Rosemary Branch RN  Outcome: Progressing  5/9/2022 1716 by Symone Ryan RN  Outcome: Progressing  Goal: Hemodynamic stability and optimal renal function maintained  5/9/2022 2353 by Rosemary Branch RN  Outcome: Progressing  5/9/2022 1716 by Symone Ryan RN  Outcome: Progressing  Goal: Glucose maintained within prescribed range  5/9/2022 2353 by Rosemary Branch RN  Outcome: Progressing  5/9/2022 1716 by Symone Ryan RN  Outcome: Progressing     Problem: Hematologic - Adult  Goal: Maintains hematologic stability  5/9/2022 2353 by Rosemary Branch RN  Outcome: Progressing  5/9/2022 1716 by Symone Ryan RN  Outcome: Progressing     Problem: Chronic Conditions and Co-morbidities  Goal: Patient's chronic conditions and co-morbidity symptoms are monitored and maintained or improved  5/9/2022 2353 by Rosemary Branch RN  Outcome: Progressing  5/9/2022 1716 by Symone Ryan RN  Outcome: Progressing

## 2022-05-10 NOTE — PROGRESS NOTES
Infectious Disease Follow up Notes  Admit Date: 5/8/2022  Hospital Day: 3    Antibiotics :   IV Cefepime  Oral Flagyl  Fluconazole      CHIEF COMPLAINT:     CHF  Rt breast mass  Cellulitis  Hypoxic resp failure  Concern for Breast cancer    Subjective interval History :  55 y.o. woman not seen physicians for long time now admitted with lower leg swelling, dizziness, Rt breast fungating mass with odor and drainage she also has Left breast swelling with on going fungal dermatitis, bi lateral lower leg edema with poor skin hygiene and poor dentition. She has morbid obesity BMI at  41, she is on high flow oxygen since admit due to sob. She was seen by Oncology and work up in progress for possible breast cancer. Pt has know breast mass for years but did not seek medical attention due to fear and denial per family at bed side. Ct chest/abd/pelvis results noted with anasarca, hepatomegaly and bi lateral axillary adenopathy with Rt Breast fungating mass. Location :Rt breast mass, pain, drainage odor +        Quality :aching        Severity  8/10:     Duration :months       Timing : intermittent   Context : Fungating breast mass     Modifying factors : none   Associated signs and symptoms: sob , cough , dizziness       Interval History : Ongoing shortness of breath using 6 L nasal cannula, having some cough. Righ right leg ongoing swelling with local cellulitis noted. Tolerating antibiotic therapy okay. Breast biopsy completed results pending. Past Medical History:    Past Medical History:   Diagnosis Date    Breast cancer [facility])        Past Surgical History:    History reviewed. No pertinent surgical history. Current Medications:    No outpatient medications have been marked as taking for the 5/8/22 encounter [facility] Encounter). Allergies:  Patient has no known allergies. Immunizations :    There is no immunization history on file for this patient. Social History:    Social History     Tobacco Use    Smoking status: Current Every Day Smoker     Packs/day: 1.50     Years: 43.00     Pack years: 64.50    Smokeless tobacco: Never Used   Substance Use Topics    Alcohol use: Yes     Alcohol/week: 1.0 - 3.0 standard drink     Types: 1 - 3 Cans of beer per week     Comment: 05/07/22 last drink    Drug use: Not on file     Social History     Tobacco Use   Smoking Status Current Every Day Smoker    Packs/day: 1.50    Years: 43.00    Pack years: 64.50   Smokeless Tobacco Never Used      Family History : no DVT no copd      REVIEW OF SYSTEMS:      Constitutional:  negative for fevers, chills, night sweats  Eyes:  negative for blurred vision, eye discharge, visual disturbance   HEENT:  negative for hearing loss, ear drainage,nasal congestion  Respiratory:    cough ++ , shortness of breath ++  or hemoptysis   Cardiovascular:  negative for chest pain, palpitations, syncope  Gastrointestinal:  negative for nausea, vomiting, diarrhea, constipation, abdominal pain  Genitourinary:  negative for frequency, dysuria, urinary incontinence, hematuria  Hematologic/Lymphatic:  negative for easy bruising, bleeding and lymphadenopathy  Allergic/Immunologic:  negative for recurrent infections, angioedema, anaphylaxis   Endocrine:  negative for weight changes, polyuria, polydipsia and polyphagia  Musculoskeletal:  Rt breast fungating mass + lower leg edema++   Integumentary: No rashes, skin lesions  Neurological:  negative for headaches, slurred speech, unilateral weakness  Psychiatric: negative for hallucinations,confusion,agitation.        PHYSICAL EXAM:      Vitals:    /63   Pulse 80   Temp 97 °F (36.1 °C) (Oral)   Resp 19   Ht 5' 6\" (1.676 m)   Wt 254 lb 3.1 oz (115.3 kg)   SpO2 92%   BMI 41.03 kg/m²     General Appearance: alert,in some  acute distress, ++  pallor, no icterus  On nasal cannula + poor dentition ++  Skin: warm and dry, no rash or erythema  Head: normocephalic and atraumatic  Eyes: pupils equal, round, and reactive to light, conjunctivae normal  ENT: tympanic membrane, external ear and ear canal normal bilaterally, nose without deformity, nasal mucosa and turbinates normal without polyps  Neck: supple and non-tender without mass, no thyromegaly  no cervical lymphadenopathy  Pulmonary/Chest: Bi basal crepts++  wheezes, rales or rhonchi, normal air movement, in some  respiratory distress  Cardiovascular: normal rate, regular rhythm, normal S1 and S2, no murmurs, rubs, clicks, or gallops, no carotid bruits  Abdomen: soft, non-tender, non-distended, normal bowel sounds, no masses or organomegaly  Extremities: no cyanosis, clubbing or ++  edema  Musculoskeletal: normal range of motion, no joint swelling, deformity or tenderness  Integumentary: No rashes, no abnormal skin lesions, no petechiae  Neurologic: reflexes normal and symmetric, no cranial nerve deficit  Psych:  Orientation, sensorium, mood normal            Lines:IV  Rt breast fungating mass++ odor+ drainage  Left beast edema and fungal rash ++         Data Review:    CBC:   Lab Results   Component Value Date    WBC 7.1 05/10/2022    HGB 11.5 (L) 05/10/2022    HCT 37.7 05/10/2022    MCV 75.0 (L) 05/10/2022     05/10/2022     RENAL:   Lab Results   Component Value Date    CREATININE 0.9 05/10/2022    BUN 8 05/10/2022     05/10/2022    K 3.8 05/10/2022    CL 99 05/10/2022    CO2 33 (H) 05/10/2022     SED RATE: No results found for: SEDRATE  CK: No results found for: CKTOTAL  CRP: No results found for: CRP  Hepatic Function Panel:   Lab Results   Component Value Date    ALKPHOS 93 05/08/2022    ALT 6 05/08/2022    AST 11 05/08/2022    PROT 6.9 05/08/2022    PROT 8.1 01/22/2011    BILITOT 1.1 05/08/2022    LABALBU 3.4 05/08/2022     UA:  Lab Results   Component Value Date    COLORU Yellow 05/08/2022    CLARITYU CLOUDY 05/08/2022    GLUCOSEU Negative 05/08/2022 BILIRUBINUR Negative 05/08/2022    KETUA Negative 05/08/2022    SPECGRAV 1.005 05/08/2022    BLOODU Negative 05/08/2022    PHUR 5.0 05/09/2022    PROTEINU Negative 05/08/2022    UROBILINOGEN 1.0 05/08/2022    NITRU Negative 05/08/2022    LEUKOCYTESUR Negative 05/08/2022    LABMICR YES 05/08/2022    URINETYPE Other 05/08/2022      Urine Microscopic:   Lab Results   Component Value Date    BACTERIA None Seen 05/08/2022    HYALCAST 1 05/08/2022    WBCUA 1 05/08/2022    RBCUA 1 05/08/2022    EPIU 3 05/08/2022     Urine Reflex to Culture:   Lab Results   Component Value Date    URRFLXCULT Not Indicated 05/08/2022         MICRO: cultures reviewed and updated by me   Blood Culture:   Lab Results   Component Value Date    [facility]  05/08/2022     No Growth to date. Any change in status will be called. BLOODCULT2  05/08/2022     No Growth to date. Any change in status will be called. Respiratory Culture:  Lab Results   Component Value Date    CULTRESP Further report to follow 05/09/2022    [facility]  05/09/2022     No Epithelial Cells seen  3+ WBC's (Polymorphonuclear)  1+ Gram variable rods       AFB:No results found for: AFBSMEAR  Viral Culture:  No results found for: COVID19  Urine Culture: No results for input(s): Harpreetiaalexander MoonIrving in the last 72 hours. IMAGING:    VL Extremity Venous Bilateral   Final Result      CT CHEST ABDOMEN PELVIS W CONTRAST   Final Result   1. CT CHEST: Mild congestive heart failure. 2. Nonspecific main pulmonary artery dilatation can be seen with pulmonary   hypertension. 3. Mild atelectasis bilateral lower lungs and small volume right pleural   effusion. Pneumonia is a consideration. 4. Large fungating right breast mass with diffuse right breast skin   thickening almost certainly primary breast cancer. 5. Bilateral axillary lymph node enlargement may be reactive or reflect   metastatic disease. 6. Anasarca. 7. CT ABDOMEN/PELVIS: No acute abnormality.    8. Nonspecific left adrenal 1.5 cm nodule. Follow-up noncontrast CT abdomen   no sooner than 48 hours from contrast administration recommended. 9. Nonspecific mild adenopathy right upper quadrant common with hepatic   steatosis, probably reactive. 10. Nonspecific splenomegaly. 11. Hepatic steatosis and hepatomegaly. 12. Anasarca. XR CHEST PORTABLE   Final Result   Pulmonary vascular congestion/interstitial edema. Cardiomegaly.          NM BONE SCAN WHOLE BODY    (Results Pending)         All the pertinent images and reports for the current Hospitalization were reviewed by me     Scheduled Meds:   metroNIDAZOLE  500 mg Oral 3 times per day    lidocaine-EPINEPHrine  20 mL IntraDERmal Once    atorvastatin  10 mg Oral Daily    heparin (porcine)  5,000 Units SubCUTAneous 3 times per day    lisinopril  5 mg Oral Daily    furosemide  40 mg IntraVENous BID    miconazole   Topical BID    cefepime  2,000 mg IntraVENous Q12H    fluconazole  200 mg Oral Daily    sodium chloride flush  5-40 mL IntraVENous 2 times per day    multivitamin  1 tablet Oral Daily    thiamine  100 mg Oral Daily       Continuous Infusions:   sodium chloride         PRN Meds:  naloxone, acetaminophen **OR** acetaminophen, ondansetron, perflutren lipid microspheres, nicotine, sodium chloride flush, sodium chloride, naloxone      Assessment:     Patient Active Problem List   Diagnosis    Suspected CHF (congestive heart failure)    Malignancy (HCC)    Leg swelling    Cellulitis    New onset of congestive heart failure (HCC)    Acute on chronic systolic heart failure (HCC)    Type 2 diabetes mellitus without complication, without long-term current use of insulin (HCC)    Aortic atherosclerosis (HCC)    Nicotine dependence     Acute Hypoxic resp failure  CHF  Anasarca  Suspect COPD with significant Smoking history   Obesity BMI at  39  Rt breast Fungating mass +  Left breast fungal dermatitis  DM new diagnosis  Smoking ++  Alcohol abuse  Lower leg edema  CT chest with fungating mass and axillary adenopathy         Seen by Multiple services due to multiple medical issues main concern is the locally advanced fungating mass Rt breast concern for Malignancy and biopsy pending and she is volume over loaded and hypoxic     Will need IV abx for the secondary infection due to fungating mass willl be able to choose oral abx once clinically better     Pt still debating what to do in regards to her treatment for the breast mass while awaiting Biopsy results         Labs, Microbiology, Radiology and all the pertinent results from current hospitalization and  care every where were reviewed  by me as a part of the evaluation   Plan:   1. Cont IV Cefepime x 2 gm q 12 hrs  2. Change to oral Flagyl x 500 mg q 8 HR  3. D/c  IV Vancomycin x 1 gm Q 12 hrs  4. Oral Fluconazole x 200 mg   5. Wound cx requested in process  6. Breast biopsy completed by Gen surgery   7. HIV  -ve  and hepatitis screen -ve  8. Quit smoking   9. Watch for DTS   10 Rt leg Kerlix and ace wraps       Discussed with patient/Family and Nursing   Risk of Complications/Morbidity: High      · Illness(es)/ Infection present that pose threat to bodily function. · There is potential for severe exacerbation of infection/side effects of treatment. Therapy requires intensive monitoring for antimicrobial agent toxicity    Discussed with patient/Family and Nursing staff     Thanks for allowing me to participate in your patient's care and please call me with any questions or concerns.     Colin Clayton MD  Infectious Disease  [facility] ([facility]) Physician  Phone: [phone]   Fax : [phone]

## 2022-05-10 NOTE — CONSULTS
Clinical Pharmacy Note  Vancomycin Consult    Yoel Montenegro is a 55 y.o. female ordered Vancomycin for cellulitis; consult received from Dr. Leyda Ocampo to manage therapy. Also receiving cefepime, Flagyl, Diflucan. Patient Active Problem List   Diagnosis    Suspected CHF (congestive heart failure)    Malignancy (HCC)    Leg swelling    Cellulitis    New onset of congestive heart failure (HCC)    Acute on chronic systolic heart failure (HCC)    Type 2 diabetes mellitus without complication, without long-term current use of insulin (HCC)    Aortic atherosclerosis (HCC)    Nicotine dependence       Allergies:  Patient has no known allergies. Temp max:  Temp (24hrs), Av.7 °F (36.5 °C), Min:96.9 °F (36.1 °C), Max:98.7 °F (37.1 °C)      Recent Labs     22  16422  0609 05/10/22  0627   WBC 8.0 10.8 7.1       Recent Labs     22  16422  0609 05/10/22  0627   BUN 7 6* 8   CREATININE 0.8 0.8 0.9         Intake/Output Summary (Last 24 hours) at 5/10/2022 7547  Last data filed at 5/10/2022 0010  Gross per 24 hour   Intake 1970 ml   Output 800 ml   Net 1170 ml         Ht Readings from Last 1 Encounters:   22 5' 6\" (1.676 m)        Wt Readings from Last 1 Encounters:   05/10/22 254 lb 3.1 oz (115.3 kg)         Estimated Creatinine Clearance: 91 mL/min (based on SCr of 0.9 mg/dL). Assessment/Plan:  Vanco day 3  Trough 14.1  Continue Vancomycin 1000 mg IV every 12 hours ordered. Regimen projects a trough level of 10-15 mg/L. Thank you for the consult.      Electronically signed by Ajith Bradford, [facility] on 5/10/2022 at 9:04 AM

## 2022-05-10 NOTE — PROGRESS NOTES
Hospitalist Progress Note  5/10/2022 9:20 AM    PCP: No primary care provider on file. [phone]     Date of Admission: 5/8/2022                                                                                                                     HOSPITAL COURSE    Patient demographics:  The patient  Cary Torres is a 55 y.o. female     Significant past medical history:   Patient Active Problem List   Diagnosis    Suspected CHF (congestive heart failure)    [facility]ignancy ([facility]    Leg swelling    Cellulitis    New onset of congestive heart failure ([facility]    Acute on chronic systolic heart failure ([facility]    Type 2 diabetes mellitus without complication, without long-term current use of insulin ([facility]    Aortic atherosclerosis (HCC)    Nicotine dependence         Presenting symptoms:  Leg swelling, shortness of breath, breast swelling and lesion    Diagnostic workup:      CONSULTS DURING ADMISSION :   IP CONSULT TO HEART FAILURE NURSE/COORDINATOR  IP CONSULT TO DIETITIAN  IP CONSULT TO INFECTIOUS DISEASES  PHARMACY TO DOSE VANCOMYCIN  IP CONSULT TO GENERAL SURGERY  IP CONSULT TO OB GYN  IP CONSULT TO ONCOLOGY  IP CONSULT TO CARDIOLOGY  IP CONSULT TO SOCIAL WORK  IP CONSULT TO PULMONOLOGY  IP CONSULT TO PALLIATIVE CARE  [facility]      Patient was diagnosed with:        Treatment while inpatient:                                                                                         ----------------------------------------------------------      SUBJECTIVE COMPLAINTS- follow up for CHF    Diet: ADULT DIET; Regular; 4 carb choices (60 gm/meal);  Low Sodium (2 gm); 1500 ml      OBJECTIVE:   Patient Active Problem List   Diagnosis    Suspected CHF (congestive heart failure)    Malignancy (HCC)    Leg swelling    Cellulitis    New onset of congestive heart failure (HCC)    Acute on chronic systolic heart failure (HCC)    Type 2 diabetes mellitus without complication, without long-term current use of insulin ([facility]    Aortic atherosclerosis ([facility] [facility]    Nicotine dependence       Allergies  Patient has no known allergies. Medications    Scheduled Meds:   metroNIDAZOLE  500 mg Oral 3 times per day    lidocaine-EPINEPHrine  20 mL IntraDERmal Once    atorvastatin  10 mg Oral Daily    heparin (porcine)  5,000 Units SubCUTAneous 3 times per day    lisinopril  5 mg Oral Daily    furosemide  40 mg IntraVENous BID    miconazole   Topical BID    cefepime  2,000 mg IntraVENous Q12H    fluconazole  200 mg Oral Daily    sodium chloride flush  5-40 mL IntraVENous 2 times per day    multivitamin  1 tablet Oral Daily    thiamine  100 mg Oral Daily    vancomycin (VANCOCIN) intermittent dosing (placeholder)   Other RX Placeholder    vancomycin  1,000 mg IntraVENous Q12H     Continuous Infusions:   sodium chloride       PRN Meds:  naloxone, acetaminophen **OR** acetaminophen, ondansetron, perflutren lipid microspheres, nicotine, sodium chloride flush, sodium chloride, naloxone    Vitals   Vitals /wt   Patient Vitals for the past 8 hrs:   BP Temp Temp src Pulse Resp SpO2 Weight   05/10/22 0744 (!) 114/54 97.4 °F (36.3 °C) Oral 82 18 97 % --   05/10/22 0742 -- -- -- -- 25 99 % --   05/10/22 0438 -- -- -- -- 14 92 % --   05/10/22 0417 -- -- -- -- -- -- 254 lb 3.1 oz (115.3 kg)   05/10/22 0351 116/62 96.9 °F (36.1 °C) Oral 95 18 91 % --        72HR INTAKE/OUTPUT:      Intake/Output Summary (Last 24 hours) at 5/10/2022 0920  Last data filed at 5/10/2022 0010  Gross per 24 hour   Intake 1970 ml   Output 800 ml   Net 1170 ml       Exam:    Gen:   Alert and oriented ×3  Eyes: PERRL. No sclera icterus. No conjunctival injection. ENT: No discharge. Pharynx clear. External appearance of ears and nose normal.  Neck: Trachea midline. No obvious mass. Resp: No accessory muscle use. No crackles. No wheezes. No rhonchi. CV: Regular rate. Regular rhythm. No murmur or rub. No edema. GI: Non-tender. Non-distended. No hernia. Skin: Warm, dry, normal texture and turgor. Lymph: No cervical LAD. No supraclavicular LAD. M/S: / Ext. No cyanosis. No clubbing. No joint deformity. Neuro: CN 2-12 are intact,  no neurologic deficits noted. PT/INR:   Recent Labs     05/08/22 2148   PROTIME 16.2*   INR 1.41*     APTT:   Recent Labs     05/08/22 2148   APTT 27.1       CBC:   Recent Labs     05/08/22 1645 05/09/22  0609 05/10/22  0627   WBC 8.0 10.8 7.1   HGB 12.7 12.4 11.5*   HCT 41.3  41.0 39.8 37.7   MCV 73.7* 74.2* 75.0*    300 232       BMP:   Recent Labs     05/08/22 1645 05/09/22 0609 05/10/22  0627    140 140   K 3.9 3.6 3.8   CL 96* 99 99   CO2 30 29 33*   BUN 7 6* 8   CREATININE 0.8 0.8 0.9       LIVER PROFILE:   Recent Labs     05/08/22  1645   ALKPHOS 93   AST 11*   ALT 6*   BILITOT 1.1*     No results for input(s): AMYLASE in the last 72 hours. No results for input(s): LIPASE in the last 72 hours. UA:  Recent Labs     05/08/22 1645   WBCUA 1   RBCUA 1       TROPONIN:   Recent Labs     05/08/22 1645 05/08/22 2148   TROPONINI 0.01 0.01       Lab Results   Component Value Date/Time    URRFLXCULT Not Indicated 05/08/2022 04:45 PM       No results for input(s): TSHREFLEX in the last 72 hours.     No components found for: [id number]  POC GLUCOSE:    Recent Labs     05/08/22  2326   POCGLU 174*     Recent Labs     05/09/22  0609   LABA1C 7.0      Lab Results   Component Value Date    LABA1C 7.0 05/09/2022         ASSESSMENT AND PLAN  Acute on chronic systolic CHF  Ejection fraction is unknown  Patient could not tolerate echocardiogram due to pain  Effusion and anasarca  Continue on diuretics  Significantly fluid overloaded    Acute respiratory failure with hypoxia and hypercapnia  On BiPAP support    Leg swelling  Likely related to CHF      Cellulitis  Continue with antibiotics  Cefepime metronidazole and fluconazole    Pneumonia  Continue antibiotics  Patient started on vancomycin/cefepime/Flagyl and fluconazole, started on miconazole powder for breast cellulitis  ID is consulted    Right breast mass  Status post biopsy  Locally advanced breast cancer. Patient refused bone scan PET scan as an outpatient    Smoking abuse    Altered mental status      Tobacco abuse Z72.0  Nicotine patch and counseling      Consult to infectious disease  Consult to cardiology  Consult oncology  Consult to general surgery  Consult to OB/GYN  Pulmonary consult        Code Status: Full Code        Dispo -cc        The patient and / or the family were informed of the results of any tests, a time was given to answer questions, a plan was proposed and they agreed with plan. Gordy Mabry MD    This note was transcribed using KAL. Please disregard any translational errors.

## 2022-05-10 NOTE — PROGRESS NOTES
Hematology Oncology Daily Progress Note    Admit Date: 5/8/2022  Hospital day several    Subjective:     Patient has complaints of mild to mod fatigue--denies sob/cp. Medication side effects: none    Scheduled Meds:   metroNIDAZOLE  500 mg Oral 3 times per day    lidocaine-EPINEPHrine  20 mL IntraDERmal Once    atorvastatin  10 mg Oral Daily    heparin (porcine)  5,000 Units SubCUTAneous 3 times per day    lisinopril  5 mg Oral Daily    furosemide  40 mg IntraVENous BID    miconazole   Topical BID    cefepime  2,000 mg IntraVENous Q12H    fluconazole  200 mg Oral Daily    sodium chloride flush  5-40 mL IntraVENous 2 times per day    multivitamin  1 tablet Oral Daily    thiamine  100 mg Oral Daily     Continuous Infusions:   sodium chloride       PRN Meds:naloxone, acetaminophen **OR** acetaminophen, ondansetron, perflutren lipid microspheres, nicotine, sodium chloride flush, sodium chloride, naloxone    Review of Systems  Pertinent items are noted in HPI. REVIEW OF SYSTEMS:         · Constitutional: Denies fever, sweats, weight loss     · Eyes: No visual changes or diplopia. No scleral icterus. · ENT: No Headaches, hearing loss or vertigo. No mouth sores or sore throat. · Cardiovascular: No chest pain, dyspnea on exertion, palpitations or loss of consciousness. · Respiratory: No cough or wheezing, no sputum production. No hemoptysis. .    · Gastrointestinal: No abdominal pain, appetite loss, blood in stools. No change in bowel habits. · Genitourinary: No dysuria, trouble voiding, or hematuria. · Musculoskeletal:  Generalized weakness. No joint complaints. · Integumentary: No rash or pruritis. · Neurological: No headache, diplopia. No change in gait, balance, or coordination. No paresthesias. · Endocrine: No temperature intolerance. No excessive thirst, fluid intake, or urination.    · Hematologic/Lymphatic: No abnormal bruising or ecchymoses, blood clots or swollen lymph nodes.  · Allergic/Immunologic: No nasal congestion or hives. ·     Objective:     Patient Vitals for the past 8 hrs:   BP Temp Temp src Pulse Resp SpO2 Height Weight   05/10/22 1100 -- -- -- -- -- -- 5' 6\" (1.676 m) --   05/10/22 1056 104/63 97 °F (36.1 °C) Oral 80 24 92 % -- --   05/10/22 0744 (!) 114/54 97.4 °F (36.3 °C) Oral 82 18 97 % -- --   05/10/22 0742 -- -- -- -- 25 99 % -- --   05/10/22 0438 -- -- -- -- 14 92 % -- --   05/10/22 0417 -- -- -- -- -- -- -- 254 lb 3.1 oz (115.3 kg)   05/10/22 0351 116/62 96.9 °F (36.1 °C) Oral 95 18 91 % -- --     I/O last 3 completed shifts:   In: 7778 [P.O.:2210]  Out: 4700 [Urine:4700]  I/O this shift:  In: 240 [P.O.:240]  Out: -     /63   Pulse 80   Temp 97 °F (36.1 °C) (Oral)   Resp 24   Ht 5' 6\" (1.676 m)   Wt 254 lb 3.1 oz (115.3 kg)   SpO2 92%   BMI 41.03 kg/m²     General Appearance:    Alert, cooperative, no distress, appears stated age   Head:    Normocephalic, without obvious abnormality, atraumatic   Eyes:    PERRL, conjunctiva/corneas clear, EOM's intact, fundi     benign, both eyes        Ears:    Normal TM's and external ear canals, both ears   Nose:   Nares normal, septum midline, mucosa normal, no drainage    or sinus tenderness   Throat:   Lips, mucosa, and tongue normal; teeth and gums normal   Neck:   Supple, symmetrical, trachea midline, no adenopathy;        thyroid:  No enlargement/tenderness/nodules; no carotid    bruit or JVD   Back:     Symmetric, no curvature, ROM normal, no CVA tenderness   Lungs:     Clear to auscultation bilaterally, respirations unlabored   Chest wall:    No tenderness or deformity   Heart:    Regular rate and rhythm, S1 and S2 normal, no murmur, rub   or gallop   Abdomen:     Soft, non-tender, bowel sounds active all four quadrants,     no masses, no organomegaly           Extremities:   Extremities normal, atraumatic, no cyanosis or edema   Pulses:   2+ and symmetric all extremities   Skin:   Skin color, texture, turgor normal, no rashes or lesions   Lymph nodes:   Cervical, supraclavicular, and axillary nodes normal   Neurologic:   CNII-XII intact. Normal strength, sensation and reflexes       throughout         Data Review  CBC:   Lab Results   Component Value Date    WBC 7.1 05/10/2022    RBC 5.02 05/10/2022       Assessment:     Principal Problem:    Suspected CHF (congestive heart failure)  Active Problems:    Malignancy (HCC)    Leg swelling    Cellulitis    New onset of congestive heart failure (HCC)    Acute on chronic systolic heart failure (HCC)    Type 2 diabetes mellitus without complication, without long-term current use of insulin (HCC)    Aortic atherosclerosis (HCC)    Nicotine dependence  Resolved Problems:    * No resolved hospital problems. *      Plan:     1. Locally advanced breast cancer. Pt s/p biopsy yesterday. She is refusing a bone scan. She is still debating whether or not she wants to do any treatment.     2. ID--AF        Electronically signed by Theresa Briones MD on 5/10/2022 at 11:12 AM

## 2022-05-10 NOTE — CONSULTS
Comprehensive Nutrition Assessment    Type and Reason for Visit:  Consult (HF education)    Nutrition Recommendations/Plan:   Continue Carb control, Low Na diet with 1500 mL FR. Start Ensure HP daily. Monitor HF dx to address diet prior to D/C if confirmed. Malnutrition Assessment:  Malnutrition Status:  No malnutrition (05/10/22 1108)      Nutrition Assessment:    Consult \"HF education\". Pt with PMH of Breast cancer who presented with leg swelling. Pt unable to complete echo d/t pain. Awaiting HF confirmation, will defer diet education at this time. Pt with fungating mass to breast. Pt on carb control, low Na diet with 1500 mL FR and eating fairly well, >50%. Will provide HP ONS to aid in wound healing. Nutrition Related Findings:    +1 BLE and BUE edema; Wound Type: Open Wounds (fungating mass)       Current Nutrition Intake & Therapies:    Average Meal Intake: 51-75%  Average Supplements Intake: None Ordered  ADULT DIET; Regular; 4 carb choices (60 gm/meal); Low Sodium (2 gm); 1500 ml    Anthropometric Measures:  Height: 5' 6\" (167.6 cm)  Ideal Body Weight (IBW): 130 lbs (59 kg)    Admission Body Weight: 256 lb (116.1 kg)  Current Body Weight: 254 lb (115.2 kg), 195.4 % IBW.  Weight Source: Bed Scale  Current BMI (kg/m2): 41                          BMI Categories: Obese Class 3 (BMI 40.0 or greater)    Estimated Daily Nutrient Needs:        Energy (kcal/day): 8631-5004 kcal (11-14 kcal/kg 115kg CBW)     Protein (g/day): 118 gm (2 gm/kg IBW)     Fluid (ml/day): per provider; 1500 mL FR    Nutrition Diagnosis:   · Increased nutrient needs related to increase demand for energy/nutrients as evidenced by wounds      Nutrition Interventions:   Food and/or Nutrient Delivery: Continue Current Diet,Start Oral Nutrition Supplement  Nutrition Education/Counseling: No recommendation at this time (awaiting confirmation of HF)  Coordination of Nutrition Care: Continue to monitor while inpatient       Goals: Goals: PO intake 50% or greater,prior to discharge       Nutrition Monitoring and Evaluation:      Food/Nutrient Intake Outcomes: Food and Nutrient Intake,Supplement Intake  Physical Signs/Symptoms Outcomes: Biochemical Data,Weight,Skin,Nutrition Focused Physical Findings,Fluid Status or Edema    Discharge Planning:    No discharge needs at this time     Paulino Cabral RD, LD  Contact: [phone]

## 2022-05-10 NOTE — PROGRESS NOTES
Pulmonary Progress Note    Date of Admission: 5/8/2022   LOS: 2 days       CC:  shortness of breath     Subjective:  Feeling better     ROS:   No nausea  No Vomiting  No chest pain       Assessment:          Plan: This note may have been transcribed using [id number] [employer]. Please disregard any translational errors. Hospital Day: 2     Breast cancer  *Oncology consult     Acute hypoxemic respiratory failure saturation less than 90% on room air  *Currently requiring 9 L nasal cannula  * Wean O2 to sat >90%   * improving with fluid removal      Acute hypercapnic respiratory failure on chronic hypercapnic respiratory failure  *Poor response to Narcan. Given 2 doses last night. * improved with bipap. Has not used after recovery      Right pleural effusion with possible pulmonary nodule  *Concern for metastasis. Too small for thoracentesis. Monitor.     Abnormal radiograph  *Patient has right-sided atelectasis. Pneumonia is possible with concerns for metastasis. *  cefepime. * d/c vanc      Tobacco abuse  *Significant tobacco history. COPD is possible but no PFTs on chart. *Must quit            Data:        PHYSICAL EXAM:   Blood pressure (!) 114/54, pulse 82, temperature 97.4 °F (36.3 °C), temperature source Oral, resp. rate 18, height 5' 6\" (1.676 m), weight 254 lb 3.1 oz (115.3 kg), SpO2 97 %.'  Body mass index is 41.03 kg/m². Gen: No distress. ENT:   Resp: No accessory muscle use. No crackles. No wheezes. No rhonchi. CV: Regular rate. Regular rhythm. No murmur or rub. No edema. Skin: Warm, dry, normal texture and turgor. No nodule on exposed extremities. M/S: No cyanosis. No clubbing. No joint deformity. Psych: Oriented x 3. No anxiety. Awake. Alert. Intact judgement and insight. Good Mood / Affect.   Memory appears in tact       Medications:    Scheduled Meds:   metroNIDAZOLE  500 mg Oral 3 times per day    lidocaine-EPINEPHrine  20 mL IntraDERmal Once    atorvastatin 10 mg Oral Daily    heparin (porcine)  5,000 Units SubCUTAneous 3 times per day    lisinopril  5 mg Oral Daily    furosemide  40 mg IntraVENous BID    miconazole   Topical BID    cefepime  2,000 mg IntraVENous Q12H    fluconazole  200 mg Oral Daily    sodium chloride flush  5-40 mL IntraVENous 2 times per day    multivitamin  1 tablet Oral Daily    thiamine  100 mg Oral Daily    vancomycin (VANCOCIN) intermittent dosing (placeholder)   Other RX Placeholder    vancomycin  1,000 mg IntraVENous Q12H       Continuous Infusions:   sodium chloride         PRN Meds:  naloxone, acetaminophen **OR** acetaminophen, ondansetron, perflutren lipid microspheres, nicotine, sodium chloride flush, sodium chloride, naloxone    Labs reviewed:  CBC:   Recent Labs     05/08/22  1645 05/09/22  0609 05/10/22  0627   WBC 8.0 10.8 7.1   HGB 12.7 12.4 11.5*   HCT 41.3  41.0 39.8 37.7   MCV 73.7* 74.2* 75.0*    300 232     BMP:   Recent Labs     05/08/22  1645 05/09/22  0609 05/10/22  0627    140 140   K 3.9 3.6 3.8   CL 96* 99 99   CO2 30 29 33*   BUN 7 6* 8   CREATININE 0.8 0.8 0.9     LIVER PROFILE:   Recent Labs     05/08/22 1645   AST 11*   ALT 6*   BILITOT 1.1*   ALKPHOS 93     PT/INR:   Recent Labs     05/08/22 2148   PROTIME 16.2*   INR 1.41*     APTT:   Recent Labs     05/08/22 2148   APTT 27.1     UA:  Recent Labs     05/08/22 1645 05/08/22 1645 05/09/22  0008   COLORU Yellow  --   --    PHUR 5.5   < > 5.0   WBCUA 1  --   --    RBCUA 1  --   --    BACTERIA None Seen  --   --    CLARITYU CLOUDY*  --   --    SPECGRAV 1.005  --   --    LEUKOCYTESUR Negative  --   --    UROBILINOGEN 1.0  --   --    BILIRUBINUR Negative  --   --    BLOODU Negative  --   --    GLUCOSEU Negative  --   --     < > = values in this interval not displayed. No results for input(s): PH, PCO2, PO2 in the last 72 hours. Cx:      Films: This note was transcribed using [employer]  Please disregard any translational errors.       Eddie Boo [facility] [phone]

## 2022-05-10 NOTE — CONSULTS
(115.3 kg)    Wt Readings from Last 10 Encounters:   05/10/22 254 lb 3.1 oz (115.3 kg)          Intake/Output Summary (Last 24 hours) at 5/10/2022 1426  Last data filed at 5/10/2022 0941  Gross per 24 hour   Intake 1610 ml   Output 800 ml   Net 810 ml       LABS  BMP:   Lab Results   Component Value Date     05/10/2022    K 3.8 05/10/2022    K 3.9 05/08/2022    CL 99 05/10/2022    CO2 33 05/10/2022    BUN 8 05/10/2022    LABALBU 3.4 05/08/2022    CREATININE 0.9 05/10/2022    CALCIUM 8.6 05/10/2022    GFRAA >60 05/10/2022    GFRAA >60 01/22/2011    LABGLOM >60 05/10/2022    GLUCOSE 120 05/10/2022     CBC:   Recent Labs     05/08/22  1645 05/09/22  0609 05/10/22  0627   WBC 8.0 10.8 7.1   HGB 12.7 12.4 11.5*   HCT 41.3  41.0 39.8 37.7   MCV 73.7* 74.2* 75.0*    300 232     BNP: No results found for: BNP    ECHOCARDIOGRAM: pending    Assessment:     CONSULTS:   IP CONSULT TO HEART FAILURE NURSE/COORDINATOR  IP CONSULT TO DIETITIAN  IP CONSULT TO INFECTIOUS DISEASES  IP CONSULT TO GENERAL SURGERY  IP CONSULT TO OB GYN  IP CONSULT TO ONCOLOGY  IP CONSULT TO CARDIOLOGY  IP CONSULT TO SOCIAL WORK  IP CONSULT TO PULMONOLOGY  IP CONSULT TO PALLIATIVE CARE  IP CONSULT TO SPIRITUAL SERVICES    Patient has a CARDIOLOGY CONSULT: Yes      Patient taking an ACEI/ARB:  Yes       Patient taking a BETA BLOCKER:  Yes    SCALE AVAILABLE:  No: ( said she will get one)     EDUCATION STATUS: Patient   [x]  Provided both written and verbal education on Heart Failure signs/symptoms. [x]  Provided instructions on daily medications. [x]  Provided instructions to monitor and record weight daily. [x]  Provided instructions to call if weight increases 3 lbs in one day or 5 lbs in one week. [x]  Received verbal acknowledgment/understanding of Heart Failure related causes. [x]  Provided instructions on how to maintain a low sodium diet.    [x]  Provided recommendations for smoking cessation programs  [x]  Provided recommendations on activity and exercise    []  Other:   Ms. Jose Echeverria was seated at the side of the bed, finishing up lunch, wearing 02 @ 8. Hi flow NC. I introduced myself and my role in heart failure education, She agreed to meet with me. At this time, she has not her echocardiogram performed 2/2 the breast mass. Regardless of those results, she is being treated for heart failure, and she has all of the s/s as well. She said she thinks she's had this swelling because she eats too many pork rinds and olives. She said she craves salty foods, and she can tell when she eats those foods, she feels more swollen. I did tell her that while those are foods high in sodium, that is not the only reason she is in heart failure. Deep Sepulveda tells me that she has \"white coat syndrome\" so she has put off seeing any doctors for years. She recalls having a 16 lb cyst removed when she was pregnant with her oldest son, and she wondered if this was something similar. I told her this was in fact something entirely different. She says she has had swelling in her legs and bottom for over a year. Sometimes she can not even bend her legs, and the pain is terrible. She said she is now wearing sweat pants and has always preferred baggy sweatshirts--she has not been able to button her Wilburt Cici in some time. She does not have a scale, but says she always weighed around 170-180 lbs  She came in to the ER @ 263lbs  . She didn't want to come to the hospital, but she fell out of bed, and was unable to get up. I told her we were glad she finally came in to have these many problems addressed. I explained the importance of knowing what she weighs;  we are working on getting her back to a 'dry weight\", so if she can get a scale, she can keep up with this at home. As important as any pill she may take, she needs to weigh herself each morning and write it down in the weight log. We went over the 3/5 lb rule quite a bit.  If she can  on the earliest signs of heart failure--she can avoid undue suffering , and a trip to the hospital.  I told her that now she has a cardiologist, she can simply call the office for help. I told her how if she can catch it early, she would be able to manage this at home. I reminded her that she has been living with, and around this heart failure for quite some time now, and now she will have many more tools to help her now. She seems to have poor insight overall, but this seemed to make sense to her. She works a couple of days a week, as a cleaning person for an office . She said her  has been going to help out as well,. She c/o severe sob with climbing the stairs, but was able to get all of the mopping done. She and her  live with their two grown sons, and granddaughter. Ilene Rivero is generally the , and cook, but admits to eating out quite a bit. I reminded her that the foods highest in sodium, besides the pork rinds and olives, are the most processed, convenient, preserved, packaged. The best way to control the sodium is to control your own ingredients, and spices. I also reminded her that this is why we get the daily weight--if she should underestimate the sodium content, or unintentionally consume something unknowingly high in sodium, her weight will reflect this the next morning. Or over the course of a week her weight will be up by 5lbs. Despite  having so many undiagnosed medical problems, they are able to get out , and enjoy going to the casino a few times each week. We again reviewed the 2 gm sodium restriction, and the 64 ounce fluid restriction. She reports that she doesn't consume that many liquids in a day, so she didn't think this would be a problem. I told her however she can keep track, is fine, but to be aware of how much she is getting. Ilene Rivero continues to smoke. She has been smoking since the 7th grade.   I urged her to ask for a nicotene patch if she needs one, and if she is ready to quit smoking, now is the best time to quit, because by the time she returns home she will have been with out one for several days. She will consider this, but didn't seem too interested in stopping. I explained she would have a follow up appointment in place when she is discharged from the hospital and she said that would be fine, she would keep her appointments. I provided her with the phone numbers to [facility], as well as the heart failure nurse navigator resource line. I told her I would be making a referral the [address] [place] [address],[address] [facility] for additional education since this was so much information at one time. The dietician will be following up as well. CURRENT DIET: ADULT DIET; Regular; 4 carb choices (60 gm/meal); Low Sodium (2 gm); 1500 ml  ADULT ORAL NUTRITION SUPPLEMENT; Lunch; Low Calorie/High Protein Oral Supplement    EDUCATIONAL PACKETS PROVIDED-[facility] Heart Failure Booklet. Fast Food Nutrition Guide  [x]  What is Heart Failure? [x]  Heart Failure: Warning Signs of a Flare-Up  [x]  Heart Failure: Making Changes to Your Diet  [x]  Heart Failure: Medications to Help Your Heart   []  Other:     PATIENT/CAREGIVER TEACHING:   Level of patient/caregiver understanding able to:   [x] Verbalize understanding   [x] Demonstrate understanding       [x] Teach back        [x] Needs reinforcement     []  Other:      TEACHING TIME:  40 minutes       Plan:       DISCHARGE PLAN:  Placement for patient upon discharge: home with support   Hospice Care:  no  Code Status: Full Code  Discharge appointment scheduled: Yes     RECOMMENDATIONS:   [x]  Encourage to call Heart Failure Resource Line with any questions or concerns. [x]  Educate further Ms. Morris's on fluid restriction 48 oz- 64 oz during inpatient stay so she can understand how to measure intake at home. [x]  Continue to educate on S/S of Heart Failure.   [x]  Emphasize daily weights, diet, and if changes, to call Heart Failure Resource Line  [x]  Other: [address] [facility],[address] [facility] referral placed           Electronically signed by Otoniel Renee RN, BSN   on 5/10/2022 at 2:26 PM

## 2022-05-10 NOTE — PROGRESS NOTES
Pt appears slightly agitated tonight but is cooperating with care. This RN educated the pt on the need to wear her bipap tonight. Pt states she was told otherwise during the day and threatened, lightly, to leave AMA. Pt refusing to wear bipap at this time. Pt does not seem to follow plan of care. Questioning why she is on so many antibiotics and why she needs oxygen. This RN educated her on medication regimen and oxygen therapy. Pt saturating above 90% on 9L HFNC at this time, sitting comfortably in bed.

## 2022-05-10 NOTE — PLAN OF CARE
Problem: Discharge Planning  Goal: Discharge to home or other facility with appropriate resources  5/10/2022 0814 by Asher Adair RN  Outcome: Progressing     Problem: Safety - Adult  Goal: Free from fall injury  5/10/2022 0814 by Asher Adair RN  Outcome: Progressing     Problem: ABCDS Injury Assessment  Goal: Absence of physical injury  5/10/2022 0814 by Asher Adair RN  Outcome: Progressing     Problem: Skin/Tissue Integrity  Goal: Absence of new skin breakdown  Description: 1. Monitor for areas of redness and/or skin breakdown  2. Assess vascular access sites hourly  3. Every 4-6 hours minimum:  Change oxygen saturation probe site  4. Every 4-6 hours:  If on nasal continuous positive airway pressure, respiratory therapy assess nares and determine need for appliance change or resting period.   5/10/2022 0814 by Asher Adair RN  Outcome: Progressing     Problem: Neurosensory - Adult  Goal: Achieves stable or improved neurological status  5/10/2022 0814 by Asher Adair RN  Outcome: Progressing     Problem: Neurosensory - Adult  Goal: Absence of seizures  5/10/2022 0814 by Asher Adair RN  Outcome: Progressing     Problem: Neurosensory - Adult  Goal: Remains free of injury related to seizures activity  5/10/2022 0814 by Asher Adair RN  Outcome: Progressing     Problem: Neurosensory - Adult  Goal: Achieves maximal functionality and self care  5/10/2022 0814 by Asher Adair RN  Outcome: Progressing     Problem: Respiratory - Adult  Goal: Achieves optimal ventilation and oxygenation  5/10/2022 0814 by Asher Adair RN  Outcome: Progressing     Problem: Cardiovascular - Adult  Goal: Maintains optimal cardiac output and hemodynamic stability  5/10/2022 0814 by Asher Adair RN  Outcome: Progressing     Problem: Cardiovascular - Adult  Goal: Absence of cardiac dysrhythmias or at baseline  5/10/2022 0814 by Asher Adair RN  Outcome: Progressing     Problem: Skin/Tissue Integrity - Adult  Goal: Skin integrity remains intact  5/10/2022 0814 by Deneen Gama RN  Outcome: Progressing     Problem: Skin/Tissue Integrity - Adult  Goal: Incisions, wounds, or drain sites healing without S/S of infection  5/10/2022 0814 by Deneen Gama RN  Outcome: Progressing     Problem: Skin/Tissue Integrity - Adult  Goal: Oral mucous membranes remain intact  5/10/2022 0814 by Deneen Gama RN  Outcome: Progressing     Problem: Musculoskeletal - Adult  Goal: Return mobility to safest level of function  5/10/2022 0814 by Deneen Gama RN  Outcome: Progressing     Problem: Musculoskeletal - Adult  Goal: Maintain proper alignment of affected body part  5/10/2022 0814 by Deneen Gama RN  Outcome: Progressing     Problem: Musculoskeletal - Adult  Goal: Return ADL status to a safe level of function  5/10/2022 0814 by Deneen Gama RN  Outcome: Progressing     Problem: Gastrointestinal - Adult  Goal: Minimal or absence of nausea and vomiting  5/10/2022 0814 by Deneen Gama RN  Outcome: Progressing     Problem: Gastrointestinal - Adult  Goal: Maintains or returns to baseline bowel function  5/10/2022 0814 by Deneen Gama RN  Outcome: Progressing     Problem: Gastrointestinal - Adult  Goal: Maintains adequate nutritional intake  5/10/2022 0814 by Deneen Gama RN  Outcome: Progressing     Problem: Gastrointestinal - Adult  Goal: Establish and maintain optimal ostomy function  5/10/2022 0814 by Deneen Gama RN  Outcome: Progressing     Problem: Genitourinary - Adult  Goal: Absence of urinary retention  5/10/2022 0814 by Deneen Gama RN  Outcome: Progressing     Problem: Genitourinary - Adult  Goal: Urinary catheter remains patent  5/10/2022 0814 by Deneen Gama RN  Outcome: Progressing     Problem: Infection - Adult  Goal: Absence of infection at discharge  5/10/2022 0814 by Deneen Gama RN  Outcome: Progressing     Problem: Infection - Adult  Goal: Absence of infection during hospitalization  5/10/2022 0814 by Emyrsofya Bah Evie Love RN  Outcome: Progressing     Problem: Infection - Adult  Goal: Absence of fever/infection during anticipated neutropenic period  5/10/2022 0814 by Iona Wei RN  Outcome: Progressing     Problem: Metabolic/Fluid and Electrolytes - Adult  Goal: Electrolytes maintained within normal limits  5/10/2022 0814 by Iona Wei RN  Outcome: Progressing     Problem: Metabolic/Fluid and Electrolytes - Adult  Goal: Hemodynamic stability and optimal renal function maintained  5/10/2022 0814 by Iona Wei RN  Outcome: Progressing     Problem: Metabolic/Fluid and Electrolytes - Adult  Goal: Glucose maintained within prescribed range  5/10/2022 0814 by Iona Wei RN  Outcome: Progressing     Problem: Hematologic - Adult  Goal: Maintains hematologic stability  5/10/2022 0814 by Iona Wei RN  Outcome: Progressing     Problem: Chronic Conditions and Co-morbidities  Goal: Patient's chronic conditions and co-morbidity symptoms are monitored and maintained or improved  5/10/2022 0814 by Iona Wei RN  Outcome: Progressing

## 2022-05-10 NOTE — PROGRESS NOTES
Pt still refusing to wear bipap at this time. Pt educated on importance but insistent on not wearing it. Provider notified.

## 2022-05-10 NOTE — PROGRESS NOTES
Referring Physician: Dr. Toan Ernst  Reason for Consultation: \"Suspected CHF\"  Chief Complaint: Short of breath    Subjective:   History of Present Illness:  Briana Garvey is a 55 y.o. patient who presented to the hospital with complaints of worsening lower extremity edema and shortness of breath. The patient did not routinely follow with a physician. She notes lower extremity edema for the past several years but did not seek medical attention. The swelling would wax and wane in intensity but would become so severe at times that she could not bend her knees or move her ankles. She would have skin breakdown at times on her lower extremities. However over the last few weeks, she has been having worsening shortness of breath. She states that she will sleep upright in bed or sitting at the edge of her bed secondary to PND/orthopnea. She says the real reason she came to the hospital was that she fell out of bed and her leg was trapped in an awkward position that she thought she may have injured her leg. [facility] was also consulted as she has a large fungating right breast mass. She first noticed 2 \"marble sized\" masses in her right breast approximately 10 years ago. For over 1 year, she says the mass on her chest will open up periodically and bleed but she has never sought medical attention. She was started on IV Lasix and notes improvement in the edema. She has generalized anasarca. Pulmonary was also consulted as the patient had hypercapnic respiratory failure requiring BiPAP there are no family members present bedside but per report the patient had been confused for several days prior to hospitalization. She endorses smoking. Interval history:  No acute overnight cardiac events. Patient's  and son are present bedside. The patient says she does not want to complete additional testing related to her breast cancer. She is still quite edematous but says the swelling is improving.   She also feels the shortness of breath is improving. She denies associated chest pains. Past Medical History:   has a past medical history of Breast cancer ([address] Surgical History:  Denies prior cardiac surgery or coronary intervention. Social History:   reports that she has been smoking. She has a 64.50 pack-year smoking history. She has never used smokeless tobacco. She reports current alcohol use of about 1.0 - 3.0 standard drink of alcohol per week. Family History:  Denies premature coronary atherosclerosis. Home Medications:  Were reviewed and are listed in nursing record and/or below  Prior to Admission medications    Not on File        CURRENT Medications:  naloxone (NARCAN) injection 2 mg, PRN  metroNIDAZOLE (FLAGYL) tablet 500 mg, 3 times per day  lidocaine-EPINEPHrine 1 %-1:901909 injection 20 mL, Once  atorvastatin (LIPITOR) tablet 10 mg, Daily  acetaminophen (TYLENOL) tablet 650 mg, Q6H PRN   Or  acetaminophen (TYLENOL) suppository 650 mg, Q6H PRN  ondansetron (ZOFRAN) injection 4 mg, Q6H PRN  heparin (porcine) injection 5,000 Units, 3 times per day  lisinopril (PRINIVIL;ZESTRIL) tablet 5 mg, Daily  furosemide (LASIX) injection 40 mg, BID  perflutren lipid microspheres (DEFINITY) injection 1.65 mg, ONCE PRN  miconazole (MICOTIN) 2 % powder, BID  nicotine (NICODERM CQ) 21 MG/24HR 1 patch, Daily PRN  cefepime (MAXIPIME) 2000 mg IVPB minibag, Q12H  fluconazole (DIFLUCAN) tablet 200 mg, Daily  sodium chloride flush 0.9 % injection 5-40 mL, 2 times per day  sodium chloride flush 0.9 % injection 5-40 mL, PRN  0.9 % sodium chloride infusion, PRN  multivitamin 1 tablet, Daily  thiamine mononitrate tablet 100 mg, Daily  naloxone (NARCAN) injection 0.4 mg, PRN      Allergies:  Patient has no known allergies. Review of Systems:   · Constitutional: no unanticipated weight loss. There's been no change in energy level, sleep pattern, or activity level. No fevers, chills.    · Eyes: No visual changes or diplopia. No scleral icterus. · ENT: No Headaches, hearing loss or vertigo. No mouth sores or sore throat. · Cardiovascular: as reviewed in HPI  · Respiratory: No cough or wheezing, no sputum production. No hemoptysis. · Gastrointestinal: No abdominal pain, appetite loss, blood in stools. No change in bowel or bladder habits. · Genitourinary: No dysuria, trouble voiding, or hematuria. · Musculoskeletal:  No gait disturbance, no joint complaints. · Integumentary: Right breast mass with redness. · Neurological: No headache, diplopia, change in muscle strength, numbness or tingling. · Psychiatric: No anxiety or depression. · Endocrine: No temperature intolerance. No excessive thirst, fluid intake, or urination. No tremor. · Hematologic/Lymphatic: No abnormal bruising or bleeding, blood clots or swollen lymph nodes. · Allergic/Immunologic: No nasal congestion or hives. Objective:   PHYSICAL EXAM:    Vitals:    05/10/22 0744   BP: (!) 114/54   Pulse: 82   Resp: 18   Temp: 97.4 °F (36.3 °C)   SpO2: 97%    Weight: 254 lb 3.1 oz (115.3 kg)       General Appearance:  Alert, cooperative, no respiratory distress, appears stated age. Wearing O2. Head:  Normocephalic, without obvious abnormality, atraumatic. Eyes:  Pupils equal and round. No scleral icterus. Mouth: Moist mucosa, no pharyngeal erythema. Nose: Nares normal. No drainage or sinus tenderness. Neck: Supple, symmetrical, trachea midline. No adenopathy. No tenderness/mass/nodules. No carotid bruit or elevated JVD. Lungs:   Respirations unlabored. Bibasilar crackles. Chest Wall:  No tenderness or deformity. Heart:  Regular rate. S1/S2 normal. No murmur, rub, or gallop. Abdomen:   Soft, non-tender, bowel sounds active. Musculoskeletal: No muscle wasting or digital clubbing. Extremities: Extremities normal, atraumatic. No cyanosis. 2+ BLE edema.  + Anasarca   Pulses: 2+ radial and carotid pulses, symmetric.    Skin: Fungating right breast mass. Pysch: Normal mood and affect. Alert and oriented x 4. Poor insight. Neurologic: Normal gross motor. Follows commands. Labs     CBC:   Lab Results   Component Value Date    WBC 7.1 05/10/2022    RBC 5.02 05/10/2022    HGB 11.5 05/10/2022    HCT 37.7 05/10/2022    MCV 75.0 05/10/2022    RDW 19.1 05/10/2022     05/10/2022     CMP:  Lab Results   Component Value Date     05/10/2022    K 3.8 05/10/2022    K 3.9 05/08/2022    CL 99 05/10/2022    CO2 33 05/10/2022    BUN 8 05/10/2022    CREATININE 0.9 05/10/2022    GFRAA >60 05/10/2022    GFRAA >60 01/22/2011    AGRATIO 1.0 05/08/2022    LABGLOM >60 05/10/2022    GLUCOSE 120 05/10/2022    PROT 6.9 05/08/2022    PROT 8.1 01/22/2011    CALCIUM 8.6 05/10/2022    BILITOT 1.1 05/08/2022    ALKPHOS 93 05/08/2022    AST 11 05/08/2022    ALT 6 05/08/2022     PT/INR:  No results found for: PTINR  HgBA1c:  Lab Results   Component Value Date    LABA1C 7.0 05/09/2022     Lab Results   Component Value Date    TROPONINI 0.01 05/08/2022       Cardiac Data     EKG: Personally interpreted. 5/8/2022. Sinus tachycardia. Low voltage QRS complex. Nonspecific T wave abnormality. Echo: None on file. Telemetry: Personally interpreted. Sinus. Assessment and Plan   1) Acute on chronic systolic heart failure. EF unknown (patient has refused echocardiogram twice secondary to left breast discomfort). NYHA IV. Patient appears hypervolemic. Continue IV Lasix. Continue ACE-I. Will start Toprol-XL tomorrow. Will add Aldactone if EF <40%. Will readdress SGLT2i as an outpatient. 2) Acute on chronic hypoxic and hypercapnic respiratory failure. Pulmonary consulted. Diurese as tolerated. Wean O2 as tolerated. 3) Breast cancer. [facility] consulted. Patient is unsure if she would pursue additional treatment but says she will refuse the bone scan. 4) CAD risk equivalent with type 2 diabetes mellitus/aortic atherosclerosis.   Diabetes management per primary team.  Continue statin therapy. 5) Nicotine Addiction. Encouraged smoking cessation but patient is in the contemplative stage. 6) Morbid obesity. BMI 41. Encourage weight loss. 7) Iron deficiency anemia. Ferritin only 17. Will order IV iron but defer additional work-up/treatment to primary team.    Overall, the problems requiring hospitalization are high in severity. Thank you for allowing us to participate in the care of Zain Amato. Rosa Freitas.  Deanne Mckeon, [address]  5/10/2022 10:53 AM

## 2022-05-11 LAB
ANION GAP SERPL CALCULATED.3IONS-SCNC: 9 MMOL/L (ref 3–16)
BASOPHILS ABSOLUTE: 0.1 K/UL (ref 0–0.2)
BASOPHILS RELATIVE PERCENT: 1.3 %
BUN BLDV-MCNC: 11 MG/DL (ref 7–20)
CALCIUM SERPL-MCNC: 8.7 MG/DL (ref 8.3–10.6)
CHLORIDE BLD-SCNC: 98 MMOL/L (ref 99–110)
CO2: 36 MMOL/L (ref 21–32)
CREAT SERPL-MCNC: 0.9 MG/DL (ref 0.6–1.1)
CULTURE, RESPIRATORY: NORMAL
EOSINOPHILS ABSOLUTE: 0.1 K/UL (ref 0–0.6)
EOSINOPHILS RELATIVE PERCENT: 2.4 %
GFR AFRICAN AMERICAN: >60
GFR NON-AFRICAN AMERICAN: >60
GLUCOSE BLD-MCNC: 102 MG/DL (ref 70–99)
GRAM STAIN RESULT: NORMAL
HCT VFR BLD CALC: 37.6 % (ref 36–48)
HEMOGLOBIN: 11.5 G/DL (ref 12–16)
LV EF: 58 %
LVEF MODALITY: NORMAL
LYMPHOCYTES ABSOLUTE: 0.8 K/UL (ref 1–5.1)
LYMPHOCYTES RELATIVE PERCENT: 12.7 %
MAGNESIUM: 1.8 MG/DL (ref 1.8–2.4)
MCH RBC QN AUTO: 22.6 PG (ref 26–34)
MCHC RBC AUTO-ENTMCNC: 30.4 G/DL (ref 31–36)
MCV RBC AUTO: 74.4 FL (ref 80–100)
MONOCYTES ABSOLUTE: 0.6 K/UL (ref 0–1.3)
MONOCYTES RELATIVE PERCENT: 9.9 %
NEUTROPHILS ABSOLUTE: 4.4 K/UL (ref 1.7–7.7)
NEUTROPHILS RELATIVE PERCENT: 73.7 %
PDW BLD-RTO: 18.9 % (ref 12.4–15.4)
PLATELET # BLD: 217 K/UL (ref 135–450)
PMV BLD AUTO: 7.2 FL (ref 5–10.5)
POTASSIUM SERPL-SCNC: 4 MMOL/L (ref 3.5–5.1)
PROCALCITONIN: 0.16 NG/ML (ref 0–0.15)
RBC # BLD: 5.06 M/UL (ref 4–5.2)
SODIUM BLD-SCNC: 143 MMOL/L (ref 136–145)
WBC # BLD: 6 K/UL (ref 4–11)

## 2022-05-11 PROCEDURE — 2060000000 HC ICU INTERMEDIATE R&B

## 2022-05-11 PROCEDURE — 2700000000 HC OXYGEN THERAPY PER DAY

## 2022-05-11 PROCEDURE — 99233 SBSQ HOSP IP/OBS HIGH 50: CPT | Performed by: INTERNAL MEDICINE

## 2022-05-11 PROCEDURE — 83735 ASSAY OF MAGNESIUM: CPT

## 2022-05-11 PROCEDURE — 97530 THERAPEUTIC ACTIVITIES: CPT

## 2022-05-11 PROCEDURE — 6370000000 HC RX 637 (ALT 250 FOR IP): Performed by: STUDENT IN AN ORGANIZED HEALTH CARE EDUCATION/TRAINING PROGRAM

## 2022-05-11 PROCEDURE — 84145 PROCALCITONIN (PCT): CPT

## 2022-05-11 PROCEDURE — 36415 COLL VENOUS BLD VENIPUNCTURE: CPT

## 2022-05-11 PROCEDURE — 6360000004 HC RX CONTRAST MEDICATION: Performed by: STUDENT IN AN ORGANIZED HEALTH CARE EDUCATION/TRAINING PROGRAM

## 2022-05-11 PROCEDURE — C8929 TTE W OR WO FOL WCON,DOPPLER: HCPCS

## 2022-05-11 PROCEDURE — 6370000000 HC RX 637 (ALT 250 FOR IP): Performed by: INTERNAL MEDICINE

## 2022-05-11 PROCEDURE — 6360000002 HC RX W HCPCS: Performed by: STUDENT IN AN ORGANIZED HEALTH CARE EDUCATION/TRAINING PROGRAM

## 2022-05-11 PROCEDURE — 6360000002 HC RX W HCPCS: Performed by: INTERNAL MEDICINE

## 2022-05-11 PROCEDURE — 99232 SBSQ HOSP IP/OBS MODERATE 35: CPT | Performed by: NURSE PRACTITIONER

## 2022-05-11 PROCEDURE — 2580000003 HC RX 258: Performed by: STUDENT IN AN ORGANIZED HEALTH CARE EDUCATION/TRAINING PROGRAM

## 2022-05-11 PROCEDURE — 6360000002 HC RX W HCPCS: Performed by: NURSE PRACTITIONER

## 2022-05-11 PROCEDURE — 94761 N-INVAS EAR/PLS OXIMETRY MLT: CPT

## 2022-05-11 PROCEDURE — 94660 CPAP INITIATION&MGMT: CPT

## 2022-05-11 PROCEDURE — 80048 BASIC METABOLIC PNL TOTAL CA: CPT

## 2022-05-11 PROCEDURE — 85025 COMPLETE CBC W/AUTO DIFF WBC: CPT

## 2022-05-11 RX ORDER — ACETAZOLAMIDE 500 MG/5ML
250 INJECTION, POWDER, LYOPHILIZED, FOR SOLUTION INTRAVENOUS ONCE
Status: COMPLETED | OUTPATIENT
Start: 2022-05-11 | End: 2022-05-11

## 2022-05-11 RX ORDER — BACITRACIN, NEOMYCIN, POLYMYXIN B 400; 3.5; 5 [USP'U]/G; MG/G; [USP'U]/G
OINTMENT TOPICAL DAILY
Status: DISCONTINUED | OUTPATIENT
Start: 2022-05-11 | End: 2022-05-18 | Stop reason: HOSPADM

## 2022-05-11 RX ORDER — FUROSEMIDE 10 MG/ML
40 INJECTION INTRAMUSCULAR; INTRAVENOUS 3 TIMES DAILY
Status: DISCONTINUED | OUTPATIENT
Start: 2022-05-11 | End: 2022-05-14

## 2022-05-11 RX ADMIN — POLYMYXIN B SULFATE, BACITRACIN ZINC, NEOMYCIN SULFATE: 5000; 3.5; 4 OINTMENT TOPICAL at 11:53

## 2022-05-11 RX ADMIN — SODIUM CHLORIDE 25 ML: 9 INJECTION, SOLUTION INTRAVENOUS at 21:03

## 2022-05-11 RX ADMIN — METRONIDAZOLE 500 MG: 500 TABLET ORAL at 05:32

## 2022-05-11 RX ADMIN — ACETAZOLAMIDE 250 MG: 500 INJECTION, POWDER, LYOPHILIZED, FOR SOLUTION INTRAVENOUS at 08:36

## 2022-05-11 RX ADMIN — HEPARIN SODIUM 5000 UNITS: 5000 INJECTION INTRAVENOUS; SUBCUTANEOUS at 13:19

## 2022-05-11 RX ADMIN — IRON SUCROSE 200 MG: 20 INJECTION, SOLUTION INTRAVENOUS at 11:53

## 2022-05-11 RX ADMIN — MICONAZOLE NITRATE: 2 POWDER TOPICAL at 20:49

## 2022-05-11 RX ADMIN — THIAMINE HCL TAB 100 MG 100 MG: 100 TAB at 07:57

## 2022-05-11 RX ADMIN — METRONIDAZOLE 500 MG: 500 TABLET ORAL at 22:25

## 2022-05-11 RX ADMIN — Medication 10 ML: at 07:57

## 2022-05-11 RX ADMIN — METRONIDAZOLE 500 MG: 500 TABLET ORAL at 13:18

## 2022-05-11 RX ADMIN — THERA TABS 1 TABLET: TAB at 07:58

## 2022-05-11 RX ADMIN — FLUCONAZOLE 200 MG: 100 TABLET ORAL at 20:49

## 2022-05-11 RX ADMIN — FUROSEMIDE 40 MG: 10 INJECTION, SOLUTION INTRAMUSCULAR; INTRAVENOUS at 07:57

## 2022-05-11 RX ADMIN — HEPARIN SODIUM 5000 UNITS: 5000 INJECTION INTRAVENOUS; SUBCUTANEOUS at 21:04

## 2022-05-11 RX ADMIN — FUROSEMIDE 40 MG: 10 INJECTION, SOLUTION INTRAMUSCULAR; INTRAVENOUS at 17:43

## 2022-05-11 RX ADMIN — FUROSEMIDE 40 MG: 10 INJECTION, SOLUTION INTRAMUSCULAR; INTRAVENOUS at 20:49

## 2022-05-11 RX ADMIN — HEPARIN SODIUM 5000 UNITS: 5000 INJECTION INTRAVENOUS; SUBCUTANEOUS at 05:32

## 2022-05-11 RX ADMIN — Medication 10 ML: at 20:50

## 2022-05-11 RX ADMIN — CEFEPIME HYDROCHLORIDE 2000 MG: 2 INJECTION, POWDER, FOR SOLUTION INTRAVENOUS at 21:03

## 2022-05-11 RX ADMIN — LISINOPRIL 5 MG: 5 TABLET ORAL at 07:58

## 2022-05-11 RX ADMIN — PERFLUTREN 1.5 ML: 6.52 INJECTION, SUSPENSION INTRAVENOUS at 14:32

## 2022-05-11 RX ADMIN — METOPROLOL SUCCINATE 25 MG: 25 TABLET, EXTENDED RELEASE ORAL at 07:58

## 2022-05-11 RX ADMIN — MICONAZOLE NITRATE: 2 POWDER TOPICAL at 07:56

## 2022-05-11 RX ADMIN — CEFEPIME HYDROCHLORIDE 2000 MG: 2 INJECTION, POWDER, FOR SOLUTION INTRAVENOUS at 08:04

## 2022-05-11 RX ADMIN — ATORVASTATIN CALCIUM 10 MG: 10 TABLET, FILM COATED ORAL at 07:57

## 2022-05-11 ASSESSMENT — ENCOUNTER SYMPTOMS
EYES NEGATIVE: 1
SHORTNESS OF BREATH: 1
GASTROINTESTINAL NEGATIVE: 1

## 2022-05-11 NOTE — PROGRESS NOTES
Occupational Therapy  Facility/Department: [address] PROGRESSIVE CARE  Daily Treatment Note  NAME: Timur Nelson  : 1965  MRN: [id number]    Date of Service: 2022    Discharge Recommendations:  Continue to assess pending progress,Home with assist PRN  OT Equipment Recommendations  ADL Assistive Devices: Shower Chair with back      Patient Diagnosis(es): The primary encounter diagnosis was New onset of congestive heart failure ([address] Diagnoses of Breast mass, right, Cellulitis, unspecified cellulitis site, and Acute respiratory failure with hypoxia ([address] were also pertinent to this visit. Assessment    Assessment: Pt completed mobility to the laurent without an AD and supervision. She was given assist to bibiana socks due to decreased time. Session cut short due to pt transported for an ECHO. Activity Tolerance: Patient tolerated treatment well  Discharge Recommendations: Continue to assess pending progress;Home with assist PRN      Plan   Plan  Times per Week: 3-5x  Current Treatment Recommendations: Functional mobility training;Balance training;Strengthening; Endurance training;Patient/Caregiver education & training; Safety education & training;Self-Care / ADL;Equipment evaluation, education, & procurement     Restrictions  Restrictions/Precautions  Restrictions/Precautions: Fall Risk  Position Activity Restriction  Other position/activity restrictions: 10L O2    Subjective   Subjective  Subjective: Pt seen bedside. Transportation in during session to transport to ECHO. Objective    Vitals     Bed Mobility Training  Bed Mobility Training: Yes  Sit to Supine: Modified independent  Transfer Training  Sit to Stand: Supervision  Stand to Sit: Supervision  Bed to Chair: Supervision  Gait  Overall Level of Assistance: Supervision  Distance (ft): 30 Feet  Assistive Device: Other (comment) (No AD)     ADL  LE Dressing: Other (Comment) (Assist to bibiana  socks.   Did not give pt a chance to try due to transportation.)                   Goals  Short Term Goals  Time Frame for Short term goals: prior to D/C  Short Term Goal 1: complete functional mobility and transfers Ind  Short Term Goal 2: complete bathing and dressing Ind  Short Term Goal 3: complete toileting Ind  Short Term Goal 4: complete grooming in stance at sink Ind  Long Term Goals  Time Frame for Long term goals : STG=LTG  Patient Goals   Patient goals : return home       Therapy Time   Individual Concurrent Group Co-treatment   Time In 1335         Time Out 1400         Minutes 25              This note to serve as OT d/c summary if pt is d/c-ed from hospital prior to next OT session.       Jose David Mccartney, [address]

## 2022-05-11 NOTE — PROGRESS NOTES
Infectious Disease Follow up Notes  Admit Date: 5/8/2022  Hospital Day: 4    Antibiotics :   IV Cefepime  Oral Flagyl  Fluconazole      CHIEF COMPLAINT:     CHF  Rt breast mass  Cellulitis  Hypoxic resp failure  Concern for Breast cancer - Phyllodes tumor     Subjective interval History :  55 y.o. woman not seen physicians for long time now admitted with lower leg swelling, dizziness, Rt breast fungating mass with odor and drainage she also has Left breast swelling with on going fungal dermatitis, bi lateral lower leg edema with poor skin hygiene and poor dentition. She has morbid obesity BMI at  41, she is on high flow oxygen since admit due to sob. She was seen by Oncology and work up in progress for possible breast cancer. Pt has know breast mass for years but did not seek medical attention due to fear and denial per family at bed side. Ct chest/abd/pelvis results noted with anasarca, hepatomegaly and bi lateral axillary adenopathy with Rt Breast fungating mass. Location :Rt breast mass, pain, drainage odor +        Quality :aching        Severity  8/10:     Duration :months       Timing : intermittent   Context : Fungating breast mass     Modifying factors : none   Associated signs and symptoms: sob , cough , dizziness       Interval History : Ongoing shortness of breath using 6 L nasal cannula, having some cough. Righ right leg ongoing swelling with local cellulitis but slowly improving and swelling going down on lasix + and Breast path results noted pt is aware of the diagnosis     Past Medical History:    Past Medical History:   Diagnosis Date    Breast cancer [facility])        Past Surgical History:    History reviewed. No pertinent surgical history. Current Medications:    No outpatient medications have been marked as taking for the 5/8/22 encounter [facility] Encounter).        Allergies:  Patient has no known allergies. Immunizations : There is no immunization history on file for this patient. Social History:    Social History     Tobacco Use    Smoking status: Current Every Day Smoker     Packs/day: 1.50     Years: 43.00     Pack years: 64.50    Smokeless tobacco: Never Used   Substance Use Topics    Alcohol use: Yes     Alcohol/week: 1.0 - 3.0 standard drink     Types: 1 - 3 Cans of beer per week     Comment: 05/07/22 last drink    Drug use: Not on file     Social History     Tobacco Use   Smoking Status Current Every Day Smoker    Packs/day: 1.50    Years: 43.00    Pack years: 64.50   Smokeless Tobacco Never Used      Family History : no DVT no copd      REVIEW OF SYSTEMS:      Constitutional:  negative for fevers, chills, night sweats  Eyes:  negative for blurred vision, eye discharge, visual disturbance   HEENT:  negative for hearing loss, ear drainage,nasal congestion  Respiratory:    cough ++ , shortness of breath ++  or hemoptysis   Cardiovascular:  negative for chest pain, palpitations, syncope  Gastrointestinal:  negative for nausea, vomiting, diarrhea, constipation, abdominal pain  Genitourinary:  negative for frequency, dysuria, urinary incontinence, hematuria  Hematologic/Lymphatic:  negative for easy bruising, bleeding and lymphadenopathy  Allergic/Immunologic:  negative for recurrent infections, angioedema, anaphylaxis   Endocrine:  negative for weight changes, polyuria, polydipsia and polyphagia  Musculoskeletal:  Rt breast fungating mass + lower leg edema++   Integumentary: No rashes, skin lesions  Neurological:  negative for headaches, slurred speech, unilateral weakness  Psychiatric: negative for hallucinations,confusion,agitation.        PHYSICAL EXAM:      Vitals:    /70   Pulse 77   Temp 98.7 °F (37.1 °C) (Oral)   Resp 22   Ht 5' 6\" (1.676 m)   Wt 253 lb 12 oz (115.1 kg)   SpO2 92%   BMI 40.96 kg/m²     General Appearance: alert,in some  acute distress, ++  pallor, no icterus  On nasal cannula + poor dentition ++  Skin: warm and dry, no rash or erythema  Head: normocephalic and atraumatic  Eyes: pupils equal, round, and reactive to light, conjunctivae normal  ENT: tympanic membrane, external ear and ear canal normal bilaterally, nose without deformity, nasal mucosa and turbinates normal without polyps  Neck: supple and non-tender without mass, no thyromegaly  no cervical lymphadenopathy  Pulmonary/Chest: Bi basal crepts++  wheezes, rales or rhonchi, normal air movement, in some  respiratory distress  Cardiovascular: normal rate, regular rhythm, normal S1 and S2, no murmurs, rubs, clicks, or gallops, no carotid bruits  Abdomen: soft, non-tender, non-distended, normal bowel sounds, no masses or organomegaly  Extremities: no cyanosis, clubbing or ++  edema  Musculoskeletal: normal range of motion, no joint swelling, deformity or tenderness  Integumentary: No rashes, no abnormal skin lesions, no petechiae  Neurologic: reflexes normal and symmetric, no cranial nerve deficit  Psych:  Orientation, sensorium, mood normal            Lines:IV  Rt breast fungating mass++ odor+ drainage  Left beast edema and fungal rash ++         Data Review:    CBC:   Lab Results   Component Value Date    WBC 6.0 05/11/2022    HGB 11.5 (L) 05/11/2022    HCT 37.6 05/11/2022    MCV 74.4 (L) 05/11/2022     05/11/2022     RENAL:   Lab Results   Component Value Date    CREATININE 0.9 05/11/2022    BUN 11 05/11/2022     05/11/2022    K 4.0 05/11/2022    CL 98 (L) 05/11/2022    CO2 36 (H) 05/11/2022     SED RATE: No results found for: SEDRATE  CK: No results found for: CKTOTAL  CRP: No results found for: CRP  Hepatic Function Panel:   Lab Results   Component Value Date    ALKPHOS 93 05/08/2022    ALT 6 05/08/2022    AST 11 05/08/2022    PROT 6.9 05/08/2022    PROT 8.1 01/22/2011    BILITOT 1.1 05/08/2022    LABALBU 3.4 05/08/2022     UA:  Lab Results   Component Value Date    COLORU Yellow 05/08/2022 CLARITYU CLOUDY 05/08/2022    GLUCOSEU Negative 05/08/2022    BILIRUBINUR Negative 05/08/2022    KETUA Negative 05/08/2022    SPECGRAV 1.005 05/08/2022    BLOODU Negative 05/08/2022    PHUR 5.0 05/09/2022    PROTEINU Negative 05/08/2022    UROBILINOGEN 1.0 05/08/2022    NITRU Negative 05/08/2022    LEUKOCYTESUR Negative 05/08/2022    LABMICR YES 05/08/2022    URINETYPE Other 05/08/2022      Urine Microscopic:   Lab Results   Component Value Date    BACTERIA None Seen 05/08/2022    HYALCAST 1 05/08/2022    WBCUA 1 05/08/2022    RBCUA 1 05/08/2022    EPIU 3 05/08/2022     Urine Reflex to Culture:   Lab Results   Component Value Date    URRFLXCULT Not Indicated 05/08/2022         MICRO: cultures reviewed and updated by me   Blood Culture:   Lab Results   Component Value Date    [facility]  05/08/2022     No Growth to date. Any change in status will be called. BLOODCULT2  05/08/2022     No Growth to date. Any change in status will be called. Respiratory Culture:  Lab Results   Component Value Date    CULTRESP Normal respiratory alejandro 05/09/2022    LABGRAM  05/10/2022     1+ Gram positive cocci  1+ Gram positive rods  1+ WBC's (Polymorphonuclear)       AFB:No results found for: AFBSMEAR  Viral Culture:  No results found for: COVID19  Urine Culture: No results for input(s): Kisha Aguiar in the last 72 hours. IMAGING:    VL Extremity Venous Bilateral   Final Result      CT CHEST ABDOMEN PELVIS W CONTRAST   Final Result   1. CT CHEST: Mild congestive heart failure. 2. Nonspecific main pulmonary artery dilatation can be seen with pulmonary   hypertension. 3. Mild atelectasis bilateral lower lungs and small volume right pleural   effusion. Pneumonia is a consideration. 4. Large fungating right breast mass with diffuse right breast skin   thickening almost certainly primary breast cancer. 5. Bilateral axillary lymph node enlargement may be reactive or reflect   metastatic disease. 6. Anasarca.    7. CT ABDOMEN/PELVIS: No acute abnormality. 8. Nonspecific left adrenal 1.5 cm nodule. Follow-up noncontrast CT abdomen   no sooner than 48 hours from contrast administration recommended. 9. Nonspecific mild adenopathy right upper quadrant common with hepatic   steatosis, probably reactive. 10. Nonspecific splenomegaly. 11. Hepatic steatosis and hepatomegaly. 12. Anasarca. XR CHEST PORTABLE   Final Result   Pulmonary vascular congestion/interstitial edema. Cardiomegaly.                All the pertinent images and reports for the current Hospitalization were reviewed by me     Scheduled Meds:   neomycin-bacitracin-polymyxin   Topical Daily    iron sucrose  200 mg IntraVENous Q24H    metoprolol succinate  25 mg Oral Daily    metroNIDAZOLE  500 mg Oral 3 times per day    lidocaine-EPINEPHrine  20 mL IntraDERmal Once    atorvastatin  10 mg Oral Daily    heparin (porcine)  5,000 Units SubCUTAneous 3 times per day    lisinopril  5 mg Oral Daily    furosemide  40 mg IntraVENous BID    miconazole   Topical BID    cefepime  2,000 mg IntraVENous Q12H    fluconazole  200 mg Oral Daily    sodium chloride flush  5-40 mL IntraVENous 2 times per day    multivitamin  1 tablet Oral Daily    thiamine  100 mg Oral Daily       Continuous Infusions:   sodium chloride 25 mL (05/10/22 2040)       PRN Meds:  naloxone, acetaminophen **OR** acetaminophen, ondansetron, nicotine, sodium chloride flush, sodium chloride, naloxone      Assessment:     Patient Active Problem List   Diagnosis    Suspected CHF (congestive heart failure)    Malignancy ([facility] [address]    Leg swelling    Cellulitis    New onset of congestive heart failure (HCC)    Acute on chronic systolic heart failure (HCC)    Type 2 diabetes mellitus without complication, without long-term current use of insulin (HCC)    Aortic atherosclerosis (HCC)    Nicotine dependence    Iron deficiency anemia     Acute Hypoxic resp failure  CHF  Anasarca  Suspect COPD with significant Smoking history   Obesity BMI at  39  Rt breast Fungating mass +  Left breast fungal dermatitis  DM new diagnosis  Smoking ++  Alcohol abuse  Lower leg edema  CT chest with fungating mass and axillary adenopathy         Seen by Multiple services due to multiple medical issues main concern is the locally advanced fungating mass Rt breast concern for Malignancy and biopsy pending and she is volume over loaded and hypoxic     Will need IV abx for the secondary infection due to fungating mass willl be able to choose oral abx once clinically better     She is now thinking of doing the Nuclear scan test         Labs, Microbiology, Radiology and all the pertinent results from current hospitalization and  care every where were reviewed  by me as a part of the evaluation   Plan:   1. Cont IV Cefepime x 2 gm q 12 hrs  2. Cont  oral Flagyl x 500 mg q 8 HR  3. Wound cx mixed skin alejandro   4. Oral Fluconazole x 200 mg   5. Resp cx  -ve  6. Breast biopsy path noted   7. HIV  -ve  and hepatitis screen -ve  8. Quit smoking   9. Watch for DTS   10 Rt leg Kerlix and ace wraps    11. Will change to oral Augmentin soon      Discussed with patient/Family and Nursing   Risk of Complications/Morbidity: High      · Illness(es)/ Infection present that pose threat to bodily function. · There is potential for severe exacerbation of infection/side effects of treatment. Therapy requires intensive monitoring for antimicrobial agent toxicity    Discussed with patient/Family and Nursing staff     Thanks for allowing me to participate in your patient's care and please call me with any questions or concerns.     Shankar Oleary MD  Infectious Disease  [facility] Physician  Phone: [phone]   Fax : [phone]

## 2022-05-11 NOTE — PLAN OF CARE
Problem: Discharge Planning  Goal: Discharge to home or other facility with appropriate resources  Outcome: Progressing     Problem: Safety - Adult  Goal: Free from fall injury  Outcome: Progressing     Problem: ABCDS Injury Assessment  Goal: Absence of physical injury  Outcome: Progressing     Problem: Skin/Tissue Integrity  Goal: Absence of new skin breakdown  Description: 1. Monitor for areas of redness and/or skin breakdown  2. Assess vascular access sites hourly  3. Every 4-6 hours minimum:  Change oxygen saturation probe site  4. Every 4-6 hours:  If on nasal continuous positive airway pressure, respiratory therapy assess nares and determine need for appliance change or resting period.   Outcome: Progressing     Problem: Neurosensory - Adult  Goal: Achieves stable or improved neurological status  Outcome: Progressing  Goal: Absence of seizures  Outcome: Progressing  Goal: Remains free of injury related to seizures activity  Outcome: Progressing  Goal: Achieves maximal functionality and self care  Outcome: Progressing     Problem: Respiratory - Adult  Goal: Achieves optimal ventilation and oxygenation  Outcome: Progressing     Problem: Cardiovascular - Adult  Goal: Maintains optimal cardiac output and hemodynamic stability  Outcome: Progressing  Goal: Absence of cardiac dysrhythmias or at baseline  Outcome: Progressing     Problem: Skin/Tissue Integrity - Adult  Goal: Skin integrity remains intact  Outcome: Progressing  Goal: Incisions, wounds, or drain sites healing without S/S of infection  Outcome: Progressing  Goal: Oral mucous membranes remain intact  Outcome: Progressing     Problem: Musculoskeletal - Adult  Goal: Return mobility to safest level of function  Outcome: Progressing  Goal: Maintain proper alignment of affected body part  Outcome: Progressing  Goal: Return ADL status to a safe level of function  Outcome: Progressing     Problem: Gastrointestinal - Adult  Goal: Minimal or absence of nausea and vomiting  Outcome: Progressing  Goal: Maintains or returns to baseline bowel function  Outcome: Progressing  Goal: Maintains adequate nutritional intake  Outcome: Progressing  Goal: Establish and maintain optimal ostomy function  Outcome: Progressing     Problem: Genitourinary - Adult  Goal: Absence of urinary retention  Outcome: Progressing  Goal: Urinary catheter remains patent  Outcome: Progressing     Problem: Infection - Adult  Goal: Absence of infection at discharge  Outcome: Progressing  Goal: Absence of infection during hospitalization  Outcome: Progressing  Goal: Absence of fever/infection during anticipated neutropenic period  Outcome: Progressing     Problem: Metabolic/Fluid and Electrolytes - Adult  Goal: Electrolytes maintained within normal limits  Outcome: Progressing  Goal: Hemodynamic stability and optimal renal function maintained  Outcome: Progressing  Goal: Glucose maintained within prescribed range  Outcome: Progressing     Problem: Hematologic - Adult  Goal: Maintains hematologic stability  Outcome: Progressing     Problem: Chronic Conditions and Co-morbidities  Goal: Patient's chronic conditions and co-morbidity symptoms are monitored and maintained or improved  Outcome: Progressing     Problem: Nutrition Deficit:  Goal: Optimize nutritional status  Outcome: Progressing

## 2022-05-11 NOTE — PROGRESS NOTES
Physician Progress Note      Jericho MASSEY #:                  [id number]  :                       1965  ADMIT DATE:       2022 3:31 PM  [address] [place] DATE:  RESPONDING  PROVIDER #:        Arthur Reese MD          QUERY TEXT:    Pt admitted with ARsF. Pt noted to have pneumonia. If possible, please   document in the progress notes and discharge summary if you are evaluating   and/or treating any of the following:      Note: CAP and HCAP indicate where the pneumonia was acquired, not a specific   type. The medical record reflects the following:  Risk Factors: Current admission for ARsF, CHF and pneumonia. Cigarette smoker  Clinical Indicators: P 111, RR 32. .. 87% RA  93% 5L  92% 11L  CT CHEST: Mild   congestive heart failure. 2. Nonspecific main pulmonary artery dilatation can   be seen with pulmonary hypertension. 3. Mild atelectasis bilateral lower   lungs and small volume right pleural effusion. Pneumonia is a consideration. Treatment: Cefipime IV, Vanco IV, Pulmonary consult, ID consult    Thank Yancy Grimm RN BSN CDS CRCR  [email or web address]  Options provided:  -- Treating for Gram negative pneumonia  -- Other - I will add my own diagnosis  -- Disagree - Not applicable / Not valid  -- Disagree - Clinically unable to determine / Unknown  -- Refer to Clinical Documentation Reviewer    PROVIDER RESPONSE TEXT:    This patient is being treated for gram negative pneumonia.     Query created by: Rosario Alaniz on 2022 8:39 AM      Electronically signed by:  Arthur Reese MD 2022 1:06 PM

## 2022-05-11 NOTE — PROGRESS NOTES
Hospitalist Progress Note  5/11/2022 8:31 AM    PCP: No primary care provider on file. [phone]     Date of Admission: 5/8/2022                                                                                                                     HOSPITAL COURSE    Patient demographics:  The patient  Jean Paul Palencia is a 55 y.o. female     Significant past medical history:   Patient Active Problem List   Diagnosis    Suspected CHF (congestive heart failure)    Malignancy ([address]    Leg swelling    Cellulitis    New onset of congestive heart failure ([address]    Acute on chronic systolic heart failure ([facility] [address]    Type 2 diabetes mellitus without complication, without long-term current use of insulin (HCC)    Aortic atherosclerosis (HCC)    Nicotine dependence    Iron deficiency anemia         Presenting symptoms:  Leg swelling, shortness of breath, breast swelling and lesion    Diagnostic workup:      CONSULTS DURING ADMISSION :   IP CONSULT TO HEART FAILURE NURSE/COORDINATOR  IP CONSULT TO DIETITIAN  IP CONSULT TO INFECTIOUS DISEASES  IP CONSULT TO GENERAL SURGERY  IP CONSULT TO OB GYN  IP CONSULT TO ONCOLOGY  IP CONSULT TO CARDIOLOGY  IP CONSULT TO SOCIAL WORK  IP CONSULT TO PULMONOLOGY  IP CONSULT TO PALLIATIVE CARE  IP CONSULT TO [place]      Patient was diagnosed with:        Treatment while inpatient:                                                                                         ----------------------------------------------------------      SUBJECTIVE COMPLAINTS- follow up for CHF    Diet: ADULT DIET; Regular; 4 carb choices (60 gm/meal); Low Sodium (2 gm); 1500 ml  ADULT ORAL NUTRITION SUPPLEMENT; Lunch;  Low Calorie/High Protein Oral Supplement      OBJECTIVE:   Patient Active Problem List   Diagnosis    Suspected CHF (congestive heart failure)    Malignancy (HCC)    Leg swelling    Cellulitis    New onset of congestive heart failure ([address]    Acute on chronic systolic heart failure (HCC)    Type 2 diabetes mellitus without complication, without long-term current use of insulin (HCC)    Aortic atherosclerosis (HCC)    Nicotine dependence    Iron deficiency anemia       Allergies  Patient has no known allergies. Medications    Scheduled Meds:   acetaZOLAMIDE  250 mg IntraVENous Once    iron sucrose  200 mg IntraVENous Q24H    metoprolol succinate  25 mg Oral Daily    metroNIDAZOLE  500 mg Oral 3 times per day    lidocaine-EPINEPHrine  20 mL IntraDERmal Once    atorvastatin  10 mg Oral Daily    heparin (porcine)  5,000 Units SubCUTAneous 3 times per day    lisinopril  5 mg Oral Daily    furosemide  40 mg IntraVENous BID    miconazole   Topical BID    cefepime  2,000 mg IntraVENous Q12H    fluconazole  200 mg Oral Daily    sodium chloride flush  5-40 mL IntraVENous 2 times per day    multivitamin  1 tablet Oral Daily    thiamine  100 mg Oral Daily     Continuous Infusions:   sodium chloride 25 mL (05/10/22 2040)     PRN Meds:  naloxone, acetaminophen **OR** acetaminophen, ondansetron, perflutren lipid microspheres, nicotine, sodium chloride flush, sodium chloride, naloxone    Vitals   Vitals /wt   Patient Vitals for the past 8 hrs:   BP Temp Temp src Pulse Resp SpO2 Weight   05/11/22 0715 119/63 99.6 °F (37.6 °C) Oral 93 27 96 % --   05/11/22 0714 -- -- -- 96 30 96 % --   05/11/22 0700 -- -- -- 91 22 95 % --   05/11/22 0552 -- -- -- -- -- -- 253 lb 12 oz (115.1 kg)   05/11/22 0409 119/77 98.7 °F (37.1 °C) Oral 91 23 96 % --   05/11/22 0407 -- -- -- -- 19 94 % --   05/11/22 0406 -- -- -- -- (!) 31 -- --   05/11/22 0051 -- -- -- -- 20 95 % --        72HR INTAKE/OUTPUT:      Intake/Output Summary (Last 24 hours) at 5/11/2022 0831  Last data filed at 5/10/2022 2300  Gross per 24 hour   Intake 480 ml   Output 1300 ml   Net -820 ml       Exam:    Gen:   Alert and oriented ×3  Eyes: PERRL. No sclera icterus. No conjunctival injection. ENT: No discharge. Pharynx clear. External appearance of ears and nose normal.  Neck: Trachea midline. No obvious mass. Resp: No accessory muscle use. No crackles. No wheezes. No rhonchi. CV: Regular rate. Regular rhythm. No murmur or rub. No edema. GI: Non-tender. Non-distended. No hernia. Skin: Warm, dry, normal texture and turgor. Lymph: No cervical LAD. No supraclavicular LAD. M/S: / Ext. No cyanosis. No clubbing. No joint deformity. Neuro: CN 2-12 are intact,  no neurologic deficits noted. PT/INR:   Recent Labs     05/08/22 2148   PROTIME 16.2*   INR 1.41*     APTT:   Recent Labs     05/08/22 2148   APTT 27.1       CBC:   Recent Labs     05/08/22 1645 05/09/22  0609 05/10/22  0627 05/11/22  0550   WBC 8.0 10.8 7.1 6.0   HGB 12.7 12.4 11.5* 11.5*   HCT 41.3  41.0 39.8 37.7 37.6   MCV 73.7* 74.2* 75.0* 74.4*    300 232 217       BMP:   Recent Labs     05/08/22  1645 05/09/22  0609 05/10/22  0627 05/11/22  0550    140 140 143   K 3.9 3.6 3.8 4.0   CL 96* 99 99 98*   CO2 30 29 33* 36*   BUN 7 6* 8 11   CREATININE 0.8 0.8 0.9 0.9       LIVER PROFILE:   Recent Labs     05/08/22 1645   ALKPHOS 93   AST 11*   ALT 6*   BILITOT 1.1*     No results for input(s): AMYLASE in the last 72 hours. No results for input(s): LIPASE in the last 72 hours. UA:  Recent Labs     05/08/22 1645   WBCUA 1   RBCUA 1       TROPONIN:   Recent Labs     05/08/22 1645 05/08/22 2148   TROPONINI 0.01 0.01       Lab Results   Component Value Date/Time    URRFLXCULT Not Indicated 05/08/2022 04:45 PM       No results for input(s): TSHREFLEX in the last 72 hours.     No components found for: [id number]  POC GLUCOSE:    Recent Labs     05/08/22  2326   POCGLU 174*     Recent Labs     05/09/22  0609   LABA1C 7.0      Lab Results   Component Value Date    LABA1C 7.0 05/09/2022         ASSESSMENT AND PLAN  Acute on chronic systolic CHF  Ejection fraction is unknown  Patient could not tolerate echocardiogram due to pain  Effusion and anasarca  Continue on diuretics  Significantly fluid overloaded    Acute respiratory failure with hypoxia and hypercapnia  Pleural effusion pneumonia and pulmonary nodule  Abnormal radiograph   Titrate oxygen for saturations greater than or equal to 90%  BiPAP as needed      Leg swelling  Likely related to CHF      Cellulitis  Continue with antibiotics  Cefepime metronidazole and fluconazole    Pneumonia  Continue antibiotics  Patient started on vancomycin/cefepime/Flagyl and fluconazole, started on miconazole powder for breast cellulitis  ID is consulted    Right breast mass  Status post biopsy  Locally advanced breast cancer. Patient refused bone scan PET scan as an outpatient    Smoking abuse    Altered mental status      Tobacco abuse Z72.0  Nicotine patch and counseling      Consult to infectious disease  Consult to cardiology  Consult oncology  Consult to general surgery  Consult to OB/GYN  Pulmonary consult        Code Status: Full Code        Dispo -patient agreed to bone scan and wants to proceed with treatment        The patient and / or the family were informed of the results of any tests, a time was given to answer questions, a plan was proposed and they agreed with plan. Mehreen Henry MD    This note was transcribed using [id number] [place]. Please disregard any translational errors.

## 2022-05-11 NOTE — PROGRESS NOTES
PALLIATIVE MEDICINE PROGRESS NOTE     Patient name:Danielito De Souza    BIV:[id number] :1965  Room/Bed:Y5D-4096/5277-01    LOS: 3 days        ASSESSMENT/RECOMMENDATIONS     55 y.o. female with hypoxia and swelling.          Symptom Management:  1. Hypoxia: Patient on 7 liters of oxygen via a high flow nasal cannula today. 2. Swelling: Patient continues to require Lasix 40 mg scheduled BID. 3. Goals of Care: Again met the patient at her bedside. Patient's spouse Jessie Mcadams and mother Emily were present for the visit today. Patient was again oriented x 4 and appeared to be decisional today. Again reviewed patient's current health condition, goals and code status. Patient indicated she would like to continue on with aggressive treatment at this time and hopes to be able to return home. Patient and her family would like to meet with [facility] post the patient's hospital stay to help assist with in home care. Patient and her family have POA paperwork and indicated they intended to fill out and complete POA paperwork today (patient again intends to name her mother Emily as her POA). Code status is to remain a Full Code.      Patient/Family Goals of Care :     - Again met the patient at her bedside. Patient's spouse Jessie Mcadams and mother Emily were present for the visit today. Patient was again oriented x 4 and appeared to be decisional today. Again reviewed patient's current health condition, goals and code status. Patient indicated she would like to continue on with aggressive treatment at this time and hopes to be able to return home. Patient and her family would like to meet with [facility] post the patient's hospital stay to help assist with in home care. Patient and her family have POA paperwork and indicated they intended to fill out and complete POA paperwork today (patient again intends to name her mother Emily as her POA). Code status is to remain a Full Code.       - Met patient and her mother Emily at the bedside today. Patient was lethargic but oriented x 4. Patient did appear to be decisional today. Reviewed patient's current health status, goals of care and code status. Patient indicated she is not interested in hospice services at this time and would like to pursue all aggressive care at this time. Code status was reviewed and the patient would like to remain a Full Code.     Disposition/Discharge Plan:   An outpatient [facility] referral was ordered for post-discharge to followup with the patient once they return home.     Advance Directives:  · Surrogate Decision Maker: Patient and her family have POA paperwork at the bedside and indicated they intended to fill out and complete POA paperwork today (patient again intends to name her mother Emily as her POA). Current surrogate decision maker remains patient's spouse Gumaro Lambert until new POA paperwork is completed. Patient was again decisional today. · Code status:  Full Code     Case discussed with: patient, floor RN, Gumaro Lambert (spouse), Emily (patient's mother). Thank you for allowing us to participate in the care of this patient. SUBJECTIVE     Chief Complaint: Hypoxia    Last 24 hours:   Patient was again oriented x 4 and decisional today. Patient indicated she would like to continue with aggressive treatment at this time. Patient and her family would like assistance from [facility] services post the patient's hospital discharge. Code status remains Full Code. ROS:    Review of Systems   Constitutional: Positive for fatigue. HENT: Negative. Eyes: Negative. Respiratory: Positive for shortness of breath. Cardiovascular: Positive for leg swelling. Gastrointestinal: Negative. Musculoskeletal: Negative. Skin: Positive for pallor. Neurological: Positive for weakness. Psychiatric/Behavioral: Negative. Negative for agitation, behavioral problems and confusion. All other systems reviewed and are negative.       A complete 10 count ROS was obtained. Pertinent positives mentioned above in HPI/ROS. All others if not mentioned are negative. OBJECTIVE   /61   Pulse 78   Temp 98.8 °F (37.1 °C) (Oral)   Resp 23   Ht 5' 6\" (1.676 m)   Wt 253 lb 12 oz (115.1 kg)   SpO2 95%   BMI 40.96 kg/m²   I/O last 3 completed shifts: In: 830 [P.O.:830]  Out: 1300 [Urine:1300]  I/O this shift: In: 840 [P.O.:840]  Out: -       Physical Exam  Vitals reviewed. Constitutional:       Appearance: She is ill-appearing. Comments: + high flow nasal cannula. HENT:      Head: Normocephalic. Nose: Nose normal.   Eyes:      Pupils: Pupils are equal, round, and reactive to light. Cardiovascular:      Rate and Rhythm: Normal rate. Pulses: Normal pulses. Pulmonary:      Comments: Breath sounds diminished bilaterally. Tachypnea noted today. Abdominal:      General: Bowel sounds are normal.      Palpations: Abdomen is soft. Musculoskeletal:      Cervical back: Neck supple. Right lower leg: Edema (1+) present. Left lower leg: Edema (1+) present. Skin:     General: Skin is warm and dry. Coloration: Skin is pale. Neurological:      Comments: Again oriented x4   Psychiatric:         Mood and Affect: Mood normal.         Thought Content:  Thought content normal.         Judgment: Judgment normal.          Total time: 43 minutes  >50% of time spent counseling patient at bedside or POA/family member if applicable , reviewing information and discussing care, coordinating with care team       Signed By: Electronically signed by APRIL Momin CNP on 5/11/2022 at 2:35 PM   Palliative Medicine   [phone]    May 11, 2022

## 2022-05-11 NOTE — PROGRESS NOTES
Pulmonary Progress Note    Date of Admission: 5/8/2022   LOS: 3 days       CC:  shortness of breath     Subjective:  Refusing bipap here or at home. ROS:   No nausea  No Vomiting  No chest pain       Assessment:          Plan: This note may have been transcribed using [employer] Please disregard any translational errors. Hospital Day: 3     Breast cancer  *Oncology consult     Acute hypoxemic respiratory failure saturation less than 90% on room air  *Currently requiring 9 L nasal cannula  * Wean O2 to sat >90% , decrease 7 L nasal cannula  * improving with fluid removal      Acute hypercapnic respiratory failure on chronic hypercapnic respiratory failure  *Resolved BiPAP  *With chronic hypercapnic respiratory failure, allow serum bicarb to maintain approximately 35. With contraction alkalosis, start low-dose Diamox x 1.      Right pleural effusion with possible pulmonary nodule  *Concern for metastasis. Too small for thoracentesis. Monitor.     Abnormal radiograph  * Patient has right-sided atelectasis. Pneumonia is possible with concerns for metastasis. * cefepime.         Tobacco abuse  *Significant tobacco history. COPD is possible but no PFTs on chart. *Must quit       D/c NIV with improvement. Data:        PHYSICAL EXAM:   Blood pressure 119/63, pulse 93, temperature 99.6 °F (37.6 °C), temperature source Oral, resp. rate 27, height 5' 6\" (1.676 m), weight 253 lb 12 oz (115.1 kg), SpO2 96 %.'  Body mass index is 40.96 kg/m². Gen: No distress. ENT:   Resp: No accessory muscle use. No crackles. No wheezes. No rhonchi. CV: Regular rate. Regular rhythm. No murmur or rub. No edema. Skin: Warm, dry, normal texture and turgor. No nodule on exposed extremities. M/S: No cyanosis. No clubbing. No joint deformity. Psych: Oriented x 3. No anxiety. Awake. Alert. Intact judgement and insight. Good Mood / Affect.   Memory appears in tact       Medications:    Scheduled Meds:   iron sucrose  200 mg IntraVENous Q24H    metoprolol succinate  25 mg Oral Daily    metroNIDAZOLE  500 mg Oral 3 times per day    lidocaine-EPINEPHrine  20 mL IntraDERmal Once    atorvastatin  10 mg Oral Daily    heparin (porcine)  5,000 Units SubCUTAneous 3 times per day    lisinopril  5 mg Oral Daily    furosemide  40 mg IntraVENous BID    miconazole   Topical BID    cefepime  2,000 mg IntraVENous Q12H    fluconazole  200 mg Oral Daily    sodium chloride flush  5-40 mL IntraVENous 2 times per day    multivitamin  1 tablet Oral Daily    thiamine  100 mg Oral Daily       Continuous Infusions:   sodium chloride 25 mL (05/10/22 2040)       PRN Meds:  naloxone, acetaminophen **OR** acetaminophen, ondansetron, perflutren lipid microspheres, nicotine, sodium chloride flush, sodium chloride, naloxone    Labs reviewed:  CBC:   Recent Labs     05/09/22  0609 05/10/22  0627 05/11/22  0550   WBC 10.8 7.1 6.0   HGB 12.4 11.5* 11.5*   HCT 39.8 37.7 37.6   MCV 74.2* 75.0* 74.4*    232 217     BMP:   Recent Labs     05/09/22  0609 05/10/22  0627 05/11/22  0550    140 143   K 3.6 3.8 4.0   CL 99 99 98*   CO2 29 33* 36*   BUN 6* 8 11   CREATININE 0.8 0.9 0.9     LIVER PROFILE:   Recent Labs     05/08/22  1645   AST 11*   ALT 6*   BILITOT 1.1*   ALKPHOS 93     PT/INR:   Recent Labs     05/08/22 2148   PROTIME 16.2*   INR 1.41*     APTT:   Recent Labs     05/08/22 2148   APTT 27.1     UA:  Recent Labs     05/08/22  1645 05/08/22  1645 05/09/22  0008   COLORU Yellow  --   --    PHUR 5.5   < > 5.0   WBCUA 1  --   --    RBCUA 1  --   --    BACTERIA None Seen  --   --    CLARITYU CLOUDY*  --   --    SPECGRAV 1.005  --   --    LEUKOCYTESUR Negative  --   --    UROBILINOGEN 1.0  --   --    BILIRUBINUR Negative  --   --    BLOODU Negative  --   --    GLUCOSEU Negative  --   --     < > = values in this interval not displayed.      No results for input(s): PH, PCO2, PO2 in the last 72 hours. Cx:      Films: This note was transcribed using [id number] tok tok tok. Please disregard any translational errors.       [facility] [phone]

## 2022-05-11 NOTE — PROGRESS NOTES
[address]   Daily Progress Note      Admit Date:  5/8/2022    CC: SOB    HPI:   Zain Amato is a 55 y.o. female with PMH unknown. + smoker. Adm to [facility] with acute hypercarbic hypoxic resp failure. SOB and BLE that was first noticed several years ago and progressively worsened. SOB worsened over the last few weeks, associated with orthopnea and confusion. Improved with rest.   She also has left breast mass. First noticed lump ~ 10 yrs ago but did not seek medical attention. Hem/Onc following. Cardiology Dr. Deanne Mckeon consulted for HF. She has been started on IV lasix. Today she is sitting up in a chair. Family at [facility]. Reports SOB with activity, resolves with rest. Much improved since admission. She is on 5L NC. Continues with significant edema BLE , abd, sacrum but reports it has improved. + fatigue and generalized weakness. Review of Systems:   General: Denies fever, chills  Skin: Denies skin changes, rash, itching, lesions.   HEENT: Denies headache, dizziness, vision changes, nosebleeds, sore throat, nasal drainage  RESP: Denies cough, sputum, dyspnea, wheeze, snoring  CARD: Denies palpitations,  murmur  GI:Denies nausea, vomiting, heartburn, loss of appetite, change in bowels  : Denies frequency, pain, incontinence, polyuria  VASC: Denies claudication, leg cramps, clots  MUSC/SKEL: Denies pain, stiffness, arthritis  PSYCH: Denies anxiety, depression, stress  NEURO: Denies numbness, tingling, weakness,change in mood or memory  HEME: Denies abn bruising, bleeding, anemia  ENDO: Denies intolerance to heat, cold, excessive thirst or hunger, hx thyroid disease    Objective:   /70   Pulse 77   Temp 98.7 °F (37.1 °C) (Oral)   Resp 22   Ht 5' 6\" (1.676 m)   Wt 253 lb 12 oz (115.1 kg)   SpO2 91%   BMI 40.96 kg/m²         Intake/Output Summary (Last 24 hours) at 5/11/2022 1844  Last data filed at 5/11/2022 1537  Gross per 24 hour   Intake 1080 ml   Output 2500 ml   Net -1420 ml     I/O since adm: Neg 3.3L    WEIGHT:Admit Weight: 263 lb 7.2 oz (119.5 kg)         Today  Weight: 253 lb 12 oz (115.1 kg)   DRY WEIGHT:  Wt Readings from Last 3 Encounters:   05/11/22 253 lb 12 oz (115.1 kg)       Physical Exam:  GEN: Appears obese, no acute distress  SKIN: Pink, warm, dry. Nails without clubbing. HEENT: PERRLA. Normocephalic, atraumatic. Neck supple. No adenopathy. LUNG: AP diameter normal. Crackles Bilateral bases R>L Respiratory effort increased. HEART: S1S2 A/R. No carotid bruit. No murmur, rub or gallop. ABD: Soft, nontender. +BS X 4 quads. No hepatomegaly. EXT: Radial and pedal pulses 2+ and symmetric. Without varicosities. 3-4+ BLE/generalized edema. MUSCSKEL: Good ROM X4 extremities. No deformity. NEURO: A/O X3. Calm and cooperative. Telemetry:    Medications:    neomycin-bacitracin-polymyxin   Topical Daily    iron sucrose  200 mg IntraVENous Q24H    metoprolol succinate  25 mg Oral Daily    metroNIDAZOLE  500 mg Oral 3 times per day    lidocaine-EPINEPHrine  20 mL IntraDERmal Once    atorvastatin  10 mg Oral Daily    heparin (porcine)  5,000 Units SubCUTAneous 3 times per day    lisinopril  5 mg Oral Daily    furosemide  40 mg IntraVENous BID    miconazole   Topical BID    cefepime  2,000 mg IntraVENous Q12H    fluconazole  200 mg Oral Daily    sodium chloride flush  5-40 mL IntraVENous 2 times per day    multivitamin  1 tablet Oral Daily    thiamine  100 mg Oral Daily      sodium chloride 25 mL (05/10/22 2040)     naloxone, acetaminophen **OR** acetaminophen, ondansetron, nicotine, sodium chloride flush, sodium chloride, naloxone    Lab Data: I have reviewed all labs below today.    CBC:   Recent Labs     05/09/22  0609 05/10/22  0627 05/11/22  0550   WBC 10.8 7.1 6.0   HGB 12.4 11.5* 11.5*   HCT 39.8 37.7 37.6   MCV 74.2* 75.0* 74.4*    232 217     BMP:   Recent Labs     05/09/22  0609 05/10/22  0627 05/11/22  0550    140 143   K 3.6 3.8 4.0   CL 99 99 98*   CO2 29 33* 36*   BUN 6* 8 11   CREATININE 0.8 0.9 0.9     GLUCOSE:   Recent Labs     05/09/22  0609 05/10/22  0627 05/11/22  0550   GLUCOSE 144* 120* 102*     LIVER PROFILE:   Lab Results   Component Value Date    AST 11 05/08/2022    ALT 6 05/08/2022    LABALBU 3.4 05/08/2022    BILITOT 1.1 05/08/2022    ALKPHOS 93 05/08/2022     PT/INR:   Lab Results   Component Value Date    PROTIME 16.2 05/08/2022    INR 1.41 05/08/2022     APTT:   Lab Results   Component Value Date    APTT 27.1 05/08/2022     Pro-BNP:    Lab Results   Component Value Date    PROBNP 3,604 05/08/2022     Parameters:   > 450 pg/mL under age 48  > 900 pg/mL ages 54-65  > 1800 pg/mL over age 76    ENZYMES:  Lab Results   Component Value Date    TROPONINI 0.01 05/08/2022    TROPONINI 0.01 05/08/2022     FASTING LIPID PANEL:  Lab Results   Component Value Date    CHOL 111 05/09/2022    HDL 26 05/09/2022    LDLCALC 67 05/09/2022    TRIG 90 05/09/2022       Diagnostics:    EKG: Sinus tachycardia  Low voltage QRS  Suspect limb lead reversal  Poor R wave progression  Confirmed by TIFFANIE AMOS, Lisa Vicente ([id number]) on 5/9/2022 9:10:34 AM    ECHO:   Summary   Left ventricular cavity size is normal.   Normal left ventricular wall thickness. Ejection fraction is visually estimated to be 55-60%. No regional wall motion abnormalities are noted. Indeterminate diastolic function. Measurement attempted while definity was administered. right ventricular appears mildly enlarged with possible mildly reduced Rv   function   Unable to obtain doppler, M-Mode and RV TDI   Right ventricular septum flattened in systole and diastole consistent with   RV pressure and volume overload. Mild pulmonic regurgitation present. Assessment/Plan:  1.) Acute on chronic diastolic heart failure with reduced RV fx, LVEF 55-60%: Mild improvement with IV diuresis. Creat stable, will increase IV lasix overnight.    NYHA Class IV  Stage C  Diuretic: lasix 40mg BID>increase to 40mg TID    Indicated for EF </=40%: NA  Cont lisinopril and Toprol XL  Cardiac Rehab for EF <35%: Not indicated for EF>35%    ICD counseling: ACC Guidelines [place] rec ICD for primary prevention of sudden cardiac death for dilated NICM or IHD at least 40 days pos MI with LVEF</= 35% and Class II/IIIsymptoms on chronic GDMT who have reasonable expectation of meaningful survival of 1 yr or more. CRT rec for LVEF </= 35%, NSR and LBBB with QRS duration 150ms or more. (Further Class II rec available in ACC guidelines)  2gm Na diet, daily weight, 64 oz fluid restriction  Avoid NSAIDS and other nephrotoxic meds  [facility] Referral: OP  HF RN referral for education    2.) Acute on chronic hypoxic hypercapnic resp failure:   Pulm consulted    3.) Breat CA:   Per Hem/Onc    4.) Nicotine addiction:   Smoking cessation counseling provided > 3 min. Benefits of smoking cessation include decrease risk for lung disease, heart disease, stroke, and peripheral vascular disease.  Recommendations include medications such as Chantix, Zyban or nicotine replacement products, counseling and resources such as 1-800-QUIT-NOW.     5.) Suspect ELAYNE: Rec Sleep Eval as OP    Electronically signed by MARCO A Calvin, APRIL Arnold CNP on 5/11/2022 at 6:44 PM

## 2022-05-11 NOTE — PROGRESS NOTES
Patient educated on fluid restriction. RN provided visuals to help patient understand the 1500mL fluid restriction. Patient verbalized understanding.

## 2022-05-11 NOTE — PLAN OF CARE
Problem: Discharge Planning  Goal: Discharge to home or other facility with appropriate resources  5/11/2022 0730 by Patricio Bella RN  Outcome: Progressing     Problem: Safety - Adult  Goal: Free from fall injury  5/11/2022 0730 by Patricio Bella RN  Outcome: Progressing     Problem: ABCDS Injury Assessment  Goal: Absence of physical injury  5/11/2022 0730 by Patricio Bella RN  Outcome: Progressing     Problem: Skin/Tissue Integrity  Goal: Absence of new skin breakdown  Description: 1. Monitor for areas of redness and/or skin breakdown  2. Assess vascular access sites hourly  3. Every 4-6 hours minimum:  Change oxygen saturation probe site  4. Every 4-6 hours:  If on nasal continuous positive airway pressure, respiratory therapy assess nares and determine need for appliance change or resting period.   5/11/2022 0730 by Patricio Bella RN  Outcome: Progressing     Problem: Neurosensory - Adult  Goal: Achieves stable or improved neurological status  5/11/2022 0730 by Patricio Bella RN  Outcome: Progressing     Problem: Neurosensory - Adult  Goal: Absence of seizures  5/11/2022 0730 by Patricio Bella RN  Outcome: Progressing     Problem: Neurosensory - Adult  Goal: Remains free of injury related to seizures activity  5/11/2022 0730 by Patricio Bella RN  Outcome: Progressing     Problem: Neurosensory - Adult  Goal: Achieves maximal functionality and self care  5/11/2022 0730 by Patricio Bella RN  Outcome: Progressing     Problem: Respiratory - Adult  Goal: Achieves optimal ventilation and oxygenation  5/11/2022 0730 by Patricio Bella RN  Outcome: Progressing     Problem: Cardiovascular - Adult  Goal: Maintains optimal cardiac output and hemodynamic stability  5/11/2022 0730 by Patricio Bella RN  Outcome: Progressing     Problem: Cardiovascular - Adult  Goal: Absence of cardiac dysrhythmias or at baseline  5/11/2022 0730 by Patricio Bella RN  Outcome: Progressing     Problem: Skin/Tissue Integrity - Adult  Goal: Skin integrity remains intact  5/11/2022 0730 by Asher Adair RN  Outcome: Progressing     Problem: Skin/Tissue Integrity - Adult  Goal: Incisions, wounds, or drain sites healing without S/S of infection  5/11/2022 0730 by Asher Adair RN  Outcome: Progressing     Problem: Skin/Tissue Integrity - Adult  Goal: Oral mucous membranes remain intact  5/11/2022 0730 by Asher Adair RN  Outcome: Progressing     Problem: Musculoskeletal - Adult  Goal: Return mobility to safest level of function  5/11/2022 0730 by Asher Adair RN  Outcome: Progressing     Problem: Musculoskeletal - Adult  Goal: Maintain proper alignment of affected body part  5/11/2022 0730 by Asher Adair RN  Outcome: Progressing     Problem: Musculoskeletal - Adult  Goal: Return ADL status to a safe level of function  5/11/2022 0730 by Asher Adair RN  Outcome: Progressing     Problem: Gastrointestinal - Adult  Goal: Minimal or absence of nausea and vomiting  5/11/2022 0730 by Asher Adair RN  Outcome: Progressing     Problem: Gastrointestinal - Adult  Goal: Maintains or returns to baseline bowel function  5/11/2022 0730 by Asher Adair RN  Outcome: Progressing     Problem: Gastrointestinal - Adult  Goal: Maintains adequate nutritional intake  5/11/2022 0730 by Asher Adair RN  Outcome: Progressing     Problem: Gastrointestinal - Adult  Goal: Establish and maintain optimal ostomy function  5/11/2022 0730 by Asher Adair RN  Outcome: Progressing     Problem: Genitourinary - Adult  Goal: Absence of urinary retention  5/11/2022 0730 by Asher Adair RN  Outcome: Progressing     Problem: Genitourinary - Adult  Goal: Urinary catheter remains patent  5/11/2022 0730 by Asher Adair RN  Outcome: Progressing     Problem: Infection - Adult  Goal: Absence of infection at discharge  5/11/2022 0730 by Asher Adair RN  Outcome: Progressing     Problem: Infection - Adult  Goal: Absence of infection during hospitalization  5/11/2022 0730 by Paramjit Bennett Shelly Noel RN  Outcome: Progressing     Problem: Infection - Adult  Goal: Absence of fever/infection during anticipated neutropenic period  5/11/2022 0730 by Luisana Sheets RN  Outcome: Progressing     Problem: Metabolic/Fluid and Electrolytes - Adult  Goal: Electrolytes maintained within normal limits  5/11/2022 0730 by Luisana Sheets RN  Outcome: Progressing     Problem: Metabolic/Fluid and Electrolytes - Adult  Goal: Hemodynamic stability and optimal renal function maintained  5/11/2022 0730 by Luisana Sheets RN  Outcome: Progressing     Problem: Metabolic/Fluid and Electrolytes - Adult  Goal: Glucose maintained within prescribed range  5/11/2022 0730 by Luisana Sheets RN  Outcome: Progressing     Problem: Hematologic - Adult  Goal: Maintains hematologic stability  5/11/2022 0730 by Luisana Sheets RN  Outcome: Progressing     Problem: Chronic Conditions and Co-morbidities  Goal: Patient's chronic conditions and co-morbidity symptoms are monitored and maintained or improved  5/11/2022 0730 by Luisana Sheets RN  Outcome: Progressing     Problem: Nutrition Deficit:  Goal: Optimize nutritional status  5/11/2022 0730 by Luisana Sheets RN  Outcome: Progressing

## 2022-05-12 ENCOUNTER — APPOINTMENT (OUTPATIENT)
Dept: NUCLEAR MEDICINE | Age: 57
DRG: 363 | End: 2022-05-12
Payer: MEDICARE

## 2022-05-12 PROBLEM — R09.89 SUSPECTED CHF (CONGESTIVE HEART FAILURE): Status: RESOLVED | Noted: 2022-05-08 | Resolved: 2022-05-12

## 2022-05-12 LAB
ANION GAP SERPL CALCULATED.3IONS-SCNC: 11 MMOL/L (ref 3–16)
BASOPHILS ABSOLUTE: 0 K/UL (ref 0–0.2)
BASOPHILS RELATIVE PERCENT: 0.9 %
BLOOD CULTURE, ROUTINE: NORMAL
BUN BLDV-MCNC: 11 MG/DL (ref 7–20)
CALCIUM SERPL-MCNC: 8.6 MG/DL (ref 8.3–10.6)
CHLORIDE BLD-SCNC: 95 MMOL/L (ref 99–110)
CO2: 35 MMOL/L (ref 21–32)
CREAT SERPL-MCNC: 0.8 MG/DL (ref 0.6–1.1)
CULTURE, BLOOD 2: NORMAL
EOSINOPHILS ABSOLUTE: 0.1 K/UL (ref 0–0.6)
EOSINOPHILS RELATIVE PERCENT: 2.6 %
GFR AFRICAN AMERICAN: >60
GFR NON-AFRICAN AMERICAN: >60
GLUCOSE BLD-MCNC: 108 MG/DL (ref 70–99)
HCT VFR BLD CALC: 36.3 % (ref 36–48)
HEMOGLOBIN: 11 G/DL (ref 12–16)
LYMPHOCYTES ABSOLUTE: 0.7 K/UL (ref 1–5.1)
LYMPHOCYTES RELATIVE PERCENT: 12.6 %
MAGNESIUM: 1.6 MG/DL (ref 1.8–2.4)
MCH RBC QN AUTO: 22.6 PG (ref 26–34)
MCHC RBC AUTO-ENTMCNC: 30.3 G/DL (ref 31–36)
MCV RBC AUTO: 74.6 FL (ref 80–100)
MONOCYTES ABSOLUTE: 0.6 K/UL (ref 0–1.3)
MONOCYTES RELATIVE PERCENT: 11.7 %
NEUTROPHILS ABSOLUTE: 3.7 K/UL (ref 1.7–7.7)
NEUTROPHILS RELATIVE PERCENT: 72.2 %
PDW BLD-RTO: 18.8 % (ref 12.4–15.4)
PLATELET # BLD: 203 K/UL (ref 135–450)
PMV BLD AUTO: 7.3 FL (ref 5–10.5)
POTASSIUM SERPL-SCNC: 3.7 MMOL/L (ref 3.5–5.1)
PROCALCITONIN: 0.15 NG/ML (ref 0–0.15)
RBC # BLD: 4.86 M/UL (ref 4–5.2)
SODIUM BLD-SCNC: 141 MMOL/L (ref 136–145)
WBC # BLD: 5.2 K/UL (ref 4–11)

## 2022-05-12 PROCEDURE — 80048 BASIC METABOLIC PNL TOTAL CA: CPT

## 2022-05-12 PROCEDURE — 99233 SBSQ HOSP IP/OBS HIGH 50: CPT | Performed by: INTERNAL MEDICINE

## 2022-05-12 PROCEDURE — 6360000002 HC RX W HCPCS: Performed by: STUDENT IN AN ORGANIZED HEALTH CARE EDUCATION/TRAINING PROGRAM

## 2022-05-12 PROCEDURE — 3430000000 HC RX DIAGNOSTIC RADIOPHARMACEUTICAL: Performed by: HOSPITALIST

## 2022-05-12 PROCEDURE — 6360000002 HC RX W HCPCS: Performed by: INTERNAL MEDICINE

## 2022-05-12 PROCEDURE — 6370000000 HC RX 637 (ALT 250 FOR IP): Performed by: INTERNAL MEDICINE

## 2022-05-12 PROCEDURE — 2060000000 HC ICU INTERMEDIATE R&B

## 2022-05-12 PROCEDURE — 83735 ASSAY OF MAGNESIUM: CPT

## 2022-05-12 PROCEDURE — 84145 PROCALCITONIN (PCT): CPT

## 2022-05-12 PROCEDURE — 97530 THERAPEUTIC ACTIVITIES: CPT

## 2022-05-12 PROCEDURE — 85025 COMPLETE CBC W/AUTO DIFF WBC: CPT

## 2022-05-12 PROCEDURE — 36415 COLL VENOUS BLD VENIPUNCTURE: CPT

## 2022-05-12 PROCEDURE — 2700000000 HC OXYGEN THERAPY PER DAY

## 2022-05-12 PROCEDURE — 6360000002 HC RX W HCPCS: Performed by: NURSE PRACTITIONER

## 2022-05-12 PROCEDURE — 6370000000 HC RX 637 (ALT 250 FOR IP): Performed by: STUDENT IN AN ORGANIZED HEALTH CARE EDUCATION/TRAINING PROGRAM

## 2022-05-12 PROCEDURE — 78306 BONE IMAGING WHOLE BODY: CPT

## 2022-05-12 PROCEDURE — 2580000003 HC RX 258: Performed by: STUDENT IN AN ORGANIZED HEALTH CARE EDUCATION/TRAINING PROGRAM

## 2022-05-12 PROCEDURE — 94760 N-INVAS EAR/PLS OXIMETRY 1: CPT

## 2022-05-12 PROCEDURE — A9503 TC99M MEDRONATE: HCPCS | Performed by: HOSPITALIST

## 2022-05-12 RX ORDER — TC 99M MEDRONATE 20 MG/10ML
25 INJECTION, POWDER, LYOPHILIZED, FOR SOLUTION INTRAVENOUS
Status: COMPLETED | OUTPATIENT
Start: 2022-05-12 | End: 2022-05-12

## 2022-05-12 RX ADMIN — TC 99M MEDRONATE 25 MILLICURIE: 20 INJECTION, POWDER, LYOPHILIZED, FOR SOLUTION INTRAVENOUS at 07:21

## 2022-05-12 RX ADMIN — METRONIDAZOLE 500 MG: 500 TABLET ORAL at 22:08

## 2022-05-12 RX ADMIN — ATORVASTATIN CALCIUM 10 MG: 10 TABLET, FILM COATED ORAL at 12:13

## 2022-05-12 RX ADMIN — THERA TABS 1 TABLET: TAB at 12:13

## 2022-05-12 RX ADMIN — POLYMYXIN B SULFATE, BACITRACIN ZINC, NEOMYCIN SULFATE: 5000; 3.5; 4 OINTMENT TOPICAL at 12:21

## 2022-05-12 RX ADMIN — SODIUM CHLORIDE 25 ML/HR: 9 INJECTION, SOLUTION INTRAVENOUS at 21:09

## 2022-05-12 RX ADMIN — HEPARIN SODIUM 5000 UNITS: 5000 INJECTION INTRAVENOUS; SUBCUTANEOUS at 22:08

## 2022-05-12 RX ADMIN — IRON SUCROSE 200 MG: 20 INJECTION, SOLUTION INTRAVENOUS at 14:59

## 2022-05-12 RX ADMIN — FUROSEMIDE 40 MG: 10 INJECTION, SOLUTION INTRAMUSCULAR; INTRAVENOUS at 12:13

## 2022-05-12 RX ADMIN — METRONIDAZOLE 500 MG: 500 TABLET ORAL at 05:39

## 2022-05-12 RX ADMIN — CEFEPIME HYDROCHLORIDE 2000 MG: 2 INJECTION, POWDER, FOR SOLUTION INTRAVENOUS at 21:10

## 2022-05-12 RX ADMIN — HEPARIN SODIUM 5000 UNITS: 5000 INJECTION INTRAVENOUS; SUBCUTANEOUS at 05:39

## 2022-05-12 RX ADMIN — CEFEPIME HYDROCHLORIDE 2000 MG: 2 INJECTION, POWDER, FOR SOLUTION INTRAVENOUS at 12:17

## 2022-05-12 RX ADMIN — HEPARIN SODIUM 5000 UNITS: 5000 INJECTION INTRAVENOUS; SUBCUTANEOUS at 14:59

## 2022-05-12 RX ADMIN — METOPROLOL SUCCINATE 25 MG: 25 TABLET, EXTENDED RELEASE ORAL at 12:13

## 2022-05-12 RX ADMIN — METRONIDAZOLE 500 MG: 500 TABLET ORAL at 15:00

## 2022-05-12 RX ADMIN — FUROSEMIDE 40 MG: 10 INJECTION, SOLUTION INTRAMUSCULAR; INTRAVENOUS at 20:59

## 2022-05-12 RX ADMIN — Medication 10 ML: at 20:59

## 2022-05-12 RX ADMIN — LISINOPRIL 5 MG: 5 TABLET ORAL at 12:13

## 2022-05-12 RX ADMIN — FUROSEMIDE 40 MG: 10 INJECTION, SOLUTION INTRAMUSCULAR; INTRAVENOUS at 14:59

## 2022-05-12 RX ADMIN — FLUCONAZOLE 200 MG: 100 TABLET ORAL at 20:59

## 2022-05-12 RX ADMIN — Medication 10 ML: at 12:21

## 2022-05-12 RX ADMIN — THIAMINE HCL TAB 100 MG 100 MG: 100 TAB at 12:13

## 2022-05-12 RX ADMIN — MICONAZOLE NITRATE: 2 POWDER TOPICAL at 12:20

## 2022-05-12 ASSESSMENT — PAIN SCALES - GENERAL
PAINLEVEL_OUTOF10: 0
PAINLEVEL_OUTOF10: 0

## 2022-05-12 NOTE — PLAN OF CARE
Problem: Discharge Planning  Goal: Discharge to home or other facility with appropriate resources  Outcome: Progressing     Problem: Safety - Adult  Goal: Free from fall injury  Outcome: Progressing     Problem: ABCDS Injury Assessment  Goal: Absence of physical injury  Outcome: Progressing     Problem: Skin/Tissue Integrity  Goal: Absence of new skin breakdown  Description: 1. Monitor for areas of redness and/or skin breakdown  2. Assess vascular access sites hourly  3. Every 4-6 hours minimum:  Change oxygen saturation probe site  4. Every 4-6 hours:  If on nasal continuous positive airway pressure, respiratory therapy assess nares and determine need for appliance change or resting period.   Outcome: Progressing     Problem: Neurosensory - Adult  Goal: Achieves stable or improved neurological status  Outcome: Progressing  Goal: Absence of seizures  Outcome: Progressing  Goal: Remains free of injury related to seizures activity  Outcome: Progressing  Goal: Achieves maximal functionality and self care  Outcome: Progressing     Problem: Respiratory - Adult  Goal: Achieves optimal ventilation and oxygenation  Outcome: Progressing     Problem: Cardiovascular - Adult  Goal: Maintains optimal cardiac output and hemodynamic stability  Outcome: Progressing  Goal: Absence of cardiac dysrhythmias or at baseline  Outcome: Progressing     Problem: Skin/Tissue Integrity - Adult  Goal: Skin integrity remains intact  Outcome: Progressing  Goal: Incisions, wounds, or drain sites healing without S/S of infection  Outcome: Progressing  Goal: Oral mucous membranes remain intact  Outcome: Progressing     Problem: Musculoskeletal - Adult  Goal: Return mobility to safest level of function  Outcome: Progressing  Goal: Maintain proper alignment of affected body part  Outcome: Progressing  Goal: Return ADL status to a safe level of function  Outcome: Progressing     Problem: Gastrointestinal - Adult  Goal: Minimal or absence of nausea and vomiting  Outcome: Progressing  Goal: Maintains or returns to baseline bowel function  Outcome: Progressing  Goal: Maintains adequate nutritional intake  Outcome: Progressing  Goal: Establish and maintain optimal ostomy function  Outcome: Progressing     Problem: Genitourinary - Adult  Goal: Absence of urinary retention  Outcome: Progressing  Goal: Urinary catheter remains patent  Outcome: Progressing     Problem: Infection - Adult  Goal: Absence of infection at discharge  Outcome: Progressing  Goal: Absence of infection during hospitalization  Outcome: Progressing  Goal: Absence of fever/infection during anticipated neutropenic period  Outcome: Progressing     Problem: Metabolic/Fluid and Electrolytes - Adult  Goal: Electrolytes maintained within normal limits  Outcome: Progressing  Goal: Hemodynamic stability and optimal renal function maintained  Outcome: Progressing  Goal: Glucose maintained within prescribed range  Outcome: Progressing     Problem: Hematologic - Adult  Goal: Maintains hematologic stability  Outcome: Progressing     Problem: Chronic Conditions and Co-morbidities  Goal: Patient's chronic conditions and co-morbidity symptoms are monitored and maintained or improved  Outcome: Progressing     Problem: Nutrition Deficit:  Goal: Optimize nutritional status  Outcome: Progressing     Problem: Pain  Goal: Verbalizes/displays adequate comfort level or baseline comfort level  Outcome: Progressing

## 2022-05-12 NOTE — PROGRESS NOTES
Hospitalist Progress Note  5/12/2022 11:41 AM    PCP: No primary care provider on file. [phone]     Date of Admission: 5/8/2022                                                                                                                     HOSPITAL COURSE    Patient demographics:  The patient  Timur Nelson is a 55 y.o. female     Significant past medical history:   Patient Active Problem List   Diagnosis    Suspected CHF (congestive heart failure)    Malignancy ([address]    Leg swelling    Cellulitis    New onset of congestive heart failure ([address]    Acute on chronic systolic heart failure [facility]    Type 2 diabetes mellitus without complication, without long-term current use of insulin (HCC)    Aortic atherosclerosis (HCC)    Nicotine dependence    Iron deficiency anemia         Presenting symptoms:  Leg swelling, shortness of breath, breast swelling and lesion    Diagnostic workup:      CONSULTS DURING ADMISSION :   IP CONSULT TO HEART FAILURE NURSE/COORDINATOR  IP CONSULT TO DIETITIAN  IP CONSULT TO INFECTIOUS DISEASES  IP CONSULT TO GENERAL SURGERY  IP CONSULT TO OB GYN  IP CONSULT TO ONCOLOGY  IP CONSULT TO CARDIOLOGY  IP CONSULT TO SOCIAL WORK  IP CONSULT TO PULMONOLOGY  IP CONSULT TO PALLIATIVE CARE  IP CONSULT TO [place]      Patient was diagnosed with:        Treatment while inpatient:                                                                                         ----------------------------------------------------------      SUBJECTIVE COMPLAINTS- follow up for CHF    Diet: ADULT DIET; Regular; 4 carb choices (60 gm/meal); Low Sodium (2 gm); 1500 ml  ADULT ORAL NUTRITION SUPPLEMENT; Lunch;  Low Calorie/High Protein Oral Supplement      OBJECTIVE:   Patient Active Problem List   Diagnosis    Suspected CHF (congestive heart failure)    Malignancy (HCC)    Leg swelling    Cellulitis    New onset of congestive heart failure ([address]    Acute on chronic systolic heart failure (HCC)    Type 2 diabetes mellitus without complication, without long-term current use of insulin (HCC)    Aortic atherosclerosis (HCC)    Nicotine dependence    Iron deficiency anemia       Allergies  Patient has no known allergies. Medications    Scheduled Meds:   neomycin-bacitracin-polymyxin   Topical Daily    furosemide  40 mg IntraVENous TID    iron sucrose  200 mg IntraVENous Q24H    metoprolol succinate  25 mg Oral Daily    metroNIDAZOLE  500 mg Oral 3 times per day    lidocaine-EPINEPHrine  20 mL IntraDERmal Once    atorvastatin  10 mg Oral Daily    heparin (porcine)  5,000 Units SubCUTAneous 3 times per day    lisinopril  5 mg Oral Daily    miconazole   Topical BID    cefepime  2,000 mg IntraVENous Q12H    fluconazole  200 mg Oral Daily    sodium chloride flush  5-40 mL IntraVENous 2 times per day    multivitamin  1 tablet Oral Daily    thiamine  100 mg Oral Daily     Continuous Infusions:   sodium chloride 25 mL (05/11/22 2103)     PRN Meds:  naloxone, acetaminophen **OR** acetaminophen, ondansetron, nicotine, sodium chloride flush, sodium chloride, naloxone    Vitals   Vitals /wt   Patient Vitals for the past 8 hrs:   BP Temp Temp src Pulse Resp SpO2 Weight   05/12/22 0803 100/62 97.8 °F (36.6 °C) Oral -- -- 93 % --   05/12/22 0552 -- -- -- -- -- -- 249 lb 11.2 oz (113.3 kg)   05/12/22 0400 117/66 98 °F (36.7 °C) Oral 92 13 93 % --        72HR INTAKE/OUTPUT:      Intake/Output Summary (Last 24 hours) at 5/12/2022 1141  Last data filed at 5/12/2022 0953  Gross per 24 hour   Intake 840 ml   Output 2300 ml   Net -1460 ml       Exam:    Gen:   Alert and oriented ×3  Eyes: PERRL. No sclera icterus. No conjunctival injection. ENT: No discharge. Pharynx clear. External appearance of ears and nose normal.  Neck: Trachea midline. No obvious mass. Resp: No accessory muscle use. No crackles. No wheezes. No rhonchi. CV: Regular rate. Regular rhythm. No murmur or rub. No edema. GI: Non-tender. Non-distended. No hernia. Skin: Warm, dry, normal texture and turgor. Lymph: No cervical LAD. No supraclavicular LAD. M/S: / Ext. No cyanosis. No clubbing. No joint deformity. Neuro: CN 2-12 are intact,  no neurologic deficits noted. PT/INR:   No results for input(s): PROTIME, INR in the last 72 hours. APTT:   No results for input(s): APTT in the last 72 hours. CBC:   Recent Labs     05/10/22  0627 05/11/22  0550 05/12/22  0536   WBC 7.1 6.0 5.2   HGB 11.5* 11.5* 11.0*   HCT 37.7 37.6 36.3   MCV 75.0* 74.4* 74.6*    217 203       BMP:   Recent Labs     05/10/22  0627 05/11/22  0550 05/12/22  0536    143 141   K 3.8 4.0 3.7   CL 99 98* 95*   CO2 33* 36* 35*   BUN 8 11 11   CREATININE 0.9 0.9 0.8       LIVER PROFILE:   No results for input(s): ALKPHOS, AST, ALT, ALB, BILIDIR, BILITOT, ALKPHOS in the last 72 hours. No results for input(s): AMYLASE in the last 72 hours. No results for input(s): LIPASE in the last 72 hours. UA:  No results for input(s): NITRITE, LABCAST, WBCUA, RBCUA, MUCUS in the last 72 hours. TROPONIN:   No results for input(s): Becca Miguel in the last 72 hours. Lab Results   Component Value Date/Time    URRFLXCULT Not Indicated 05/08/2022 04:45 PM       No results for input(s): TSHREFLEX in the last 72 hours. No components found for: [id number]  POC GLUCOSE:    No results for input(s): POCGLU in the last 72 hours. No results for input(s): LABA1C in the last 72 hours. Lab Results   Component Value Date    LABA1C 7.0 05/09/2022         ASSESSMENT AND PLAN  Acute on chronic systolic CHF  Echocardiogram shows normal ejection fraction.   Effusion and anasarca  Continue on diuretics  Weight improved to 256-249  Significantly fluid overloaded    Acute respiratory failure with hypoxia and hypercapnia  Pleural effusion pneumonia and pulmonary nodule  Abnormal radiograph   Titrate oxygen for saturations greater than or equal to 90%  Patient can be discharged home on oxygen  Pulmonary is following      Leg swelling  Likely related to CHF      Cellulitis  Right breast fungating mass   Continue with antibiotics  Cefepime metronidazole and fluconazole  Infectious diseases following    Breast cancer  Right breast fungating mass  Patient needs surgery and radiation treatment  Hematology oncology is following        Pneumonia  Continue antibiotics  Patient started on vancomycin/cefepime/Flagyl and fluconazole, started on miconazole powder for breast cellulitis        Smoking abuse    Altered mental status      Tobacco abuse Z72.0  Nicotine patch and counseling      Consult to infectious disease  Consult to cardiology  Consult oncology  Consult to general surgery  Consult to OB/GYN  Pulmonary consult        Code Status: Full Code        Dispo -patient agreed to bone scan and wants to proceed with treatment        The patient and / or the family were informed of the results of any tests, a time was given to answer questions, a plan was proposed and they agreed with plan. Gordy Mabry MD    This note was transcribed using [id number] [address]. Please disregard any translational errors.

## 2022-05-12 NOTE — PROGRESS NOTES
Occupational Therapy  Facility/Department: [facility] PROGRESSIVE CARE  Occupational Therapy Initial Assessment    Name: Spencer Alatorre  : 1965  MRN: [id number]  Date of Service: 2022    Discharge Recommendations:  Continue to assess pending progress,Home with assist PRN  OT Equipment Recommendations  ADL Assistive Devices: Shower Chair with back       Patient Diagnosis(es): The primary encounter diagnosis was New onset of congestive heart failure ([facility] [address] Diagnoses of Breast mass, right, Cellulitis, unspecified cellulitis site, and Acute respiratory failure with hypoxia ([facility] [address] were also pertinent to this visit. Past Medical History:  has a past medical history of Breast cancer ([facility] [address] Past Surgical History:  has no past surgical history on file. Assessment   Performance deficits / Impairments: Decreased functional mobility ; Decreased balance;Decreased ADL status; Decreased endurance;Decreased high-level IADLs  Assessment: Spencer Alatorre is a 55 y.o. female who presents to the emergency department by private vehicle complaining of lower extremity swelling. Patient states for the past year she has had intermittent bilateral lower extremity swelling. States swelling has been worse over the past couple weeks. Patient states \"I ate a lot of salt recently\", believes this is the cause. She also complaining of wound on her right shin. She cut herself shaving about a week ago. Wound is still open and she has developed blisters around region. Over the past couple days she has developed shortness of breath. States she becomes easily winded when overexcited, talking too long or exerting herself. Patient sleeps elevated on pillows, denies any increase in elevation. She has had a mild occasional cough, denies hemoptysis or production. Denies any chest pain, lightheadedness, fevers, chills, palpitations. PTA pt from home with  where pt was Ind with mobility and ADLs.  Pt currently functioning below baseline completing mobility and transfers with supervision/SBA. Pt required cues to manage oxygen tubing. She attempted to manage her socks but required assist due to excess edema. Pt will benefit from skilled OT services at this time. Anticipate pt safe to return home with improved O2 and shower chair due to decreased endurance. Prognosis: Good  Activity Tolerance  Activity Tolerance: Patient Tolerated treatment well        Plan   Plan  Times per Week: 3-5x  Current Treatment Recommendations: Functional mobility training,Balance training,Strengthening,Endurance training,Patient/Caregiver education & training,Safety education & training,Self-Care / ADL,Equipment evaluation, education, & procurement     Restrictions  Restrictions/Precautions  Restrictions/Precautions: Fall Risk  Position Activity Restriction  Other position/activity restrictions: 10L O2    Subjective   General  Chart Reviewed: Yes,Progress Notes  Patient assessed for rehabilitation services?: Yes  Additional Pertinent Hx: per ED note, Glendy Ibrahim is a 55 y.o. female who presents to the emergency department by private vehicle complaining of lower extremity swelling. Patient states for the past year she has had intermittent bilateral lower extremity swelling. States swelling has been worse over the past couple weeks. Patient states \"I ate a lot of salt recently\", believes this is the cause. She also complaining of wound on her right shin. She cut herself shaving about a week ago. Wound is still open and she has developed blisters around region. Over the past couple days she has developed shortness of breath. States she becomes easily winded when overexcited, talking too long or exerting herself. Patient sleeps elevated on pillows, denies any increase in elevation. She has had a mild occasional cough, denies hemoptysis or production. Denies any chest pain, lightheadedness, fevers, chills, palpitations.   Family / Caregiver Present: No  Referring Practitioner: Annie Jonas MD  Subjective  Subjective: Pt seen bedside and agreed to OT treatment. Pt on 6L O2. Social/Functional History  Social/Functional History  Lives With: Spouse  Type of Home: House  Home Layout: Two level,Able to Live on Main level with bedroom/bathroom  Home Access: Stairs to enter without rails  Entrance Stairs - Number of Steps: 3-4  Bathroom Shower/Tub: Tub/Shower unit  Bathroom Toilet: Standard  Home Equipment:  (No DME)  ADL Assistance: Independent  Ambulation Assistance: Independent  Transfer Assistance: Independent  Active : Yes  Additional Comments: No Falls       Objective             Safety Devices  Type of Devices: All fall risk precautions in place;Call light within reach;Gait belt;Left in chair  Gait  Overall Level of Assistance: Supervision  Interventions: Safety awareness training (Education on managing oxygen tubing.)  Assistive Device: Other (comment) (No AD)  Wheelchair Bed Transfers  Wheelchair/Bed - Technique: Ambulating  Equipment Used: Other (recliner)  Level of Asssistance: Stand by assistance  Wheelchair Transfers Comments: Assist for line management. Pt educated on managing oxygen tubing as she may be discharged home with oxygen per MD note. ADL  LE Dressing: Maximum assistance (PT was able to remove her left sock with her right foot. When attempting to bibiana she was unable to cross her leg enough and also having difficulty leaning forward due to edema. Pt reports  was assisting at home prior to admission.)  Toileting: Other (Comment) (Purewick placed)        Bed mobility  Supine to Sit: Unable to assess  Sit to Supine: Unable to assess  Bed Mobility Comments: Pt in the recliner at beginning/end of the session. Transfers  Sit to stand: Stand by assistance  Stand to sit: Stand by assistance                        Education Provided Comments: Managing oxygen tubing with ambulation and transfers.   Education Method: Verbal;Demonstration  Education Outcome: Verbalized understanding;Demonstrated understanding                          AM-PAC Score        AM-PAC Inpatient Daily Activity Raw Score: 20 (05/12/22 1559)  AM-PAC Inpatient ADL T-Scale Score : 42.03 (05/12/22 1559)  ADL Inpatient CMS 0-100% Score: 38.32 (05/12/22 1559)  ADL Inpatient CMS G-Code Modifier : CJ (05/12/22 1559)    Goals  Short Term Goals  Time Frame for Short term goals: prior to D/C  Short Term Goal 1: complete functional mobility and transfers Ind  Short Term Goal 2: complete bathing and dressing Ind  Short Term Goal 3: complete toileting Ind  Short Term Goal 4: complete grooming in stance at sink Ind  Long Term Goals  Time Frame for Long term goals : STG=LTG  Patient Goals   Patient goals : return home       Therapy Time   Individual Concurrent Group Co-treatment   Time In 7667         Time Out 2244         Minutes 35              This note to serve as OT d/c summary if pt is d/c-ed from hospital prior to next OT session.       Jose David Mccartney, [address]

## 2022-05-12 NOTE — PROGRESS NOTES
Physician Progress Note      Kay Madrigal  [facility] #:                  [id number]  :                       1965  ADMIT DATE:       2022 3:31 PM  DISCH DATE:  RESPONDING  PROVIDER #:        Laura Noriega MD          QUERY TEXT:    Patient admitted with SOB and BLE edema. Noted documentation of acute on   chronic diastolic CHF in cardiology pn on 22 and acute on chronic   systolic CHF in attending pn on 22. If possible, please document in   progress notes and discharge summary if you are evaluating and /or treating   any of the following: The medical record reflects the following:  Risk Factors: Current admission with CHF, cigarette smoker  Clinical Indicators: VS- P 111, RR 32,  /101 - 142/107 - 102/67    87%   RA  93% 5L  92% 11L      BNP 3604,   EF 55-60%    CT CHEST: Mild  congestive heart failure. Lorean Snare Lorean Snare Per cardiology pn on -Acute on chronic   diastolic heart failure with reduced RV fx, LVEF 55-60%: Mild improvement with   IV  diuresis. ........ Lorean Snare Per attending pn on -Acute on chronic systolic CHF     Ejection fraction is unknown  Treatment: Lasix IV, Cardiology consult, ECHO, Lisinopril, Toprol XL, 2gm Na   diet, 1500cc fluid restriction, daily wts, I&O    Thank You,  Rosette Werner RN BSN CDS CRCR  [email or web address]  Options provided:  -- Acute on chronic diastolic CHF confirmed and acute on chronic systolic CHF   ruled out  -- Acute on chronic systolic CHF confirmed and acute on chronic diastolic CHF   ruled out  -- Acute on chronic diastolic CHF and acute on chronic systolic CHF confirmed  -- Acute on chronic diastolic CHF and acute on chronic systolic CHF ruled out  -- Other - I will add my own diagnosis  -- Disagree - Not applicable / Not valid  -- Disagree - Clinically unable to determine / Unknown  -- Refer to Clinical Documentation Reviewer    PROVIDER RESPONSE TEXT:    After study, acute on chronic diastolic CHF confirmed and acute on chronic   systolic CHF ruled out.     Query created by: Debbi Purcell on 5/12/2022 6:53 AM      Electronically signed by:  Clayton Sanchez MD 5/12/2022 2:57 PM

## 2022-05-12 NOTE — PROGRESS NOTES
Hematology Oncology Daily Progress Note    Admit Date: 5/8/2022  Hospital day several    Subjective:     Patient has complaints of stable WALTERS--denies sob/cp. Medication side effects: none    Scheduled Meds:   neomycin-bacitracin-polymyxin   Topical Daily    furosemide  40 mg IntraVENous TID    iron sucrose  200 mg IntraVENous Q24H    metoprolol succinate  25 mg Oral Daily    metroNIDAZOLE  500 mg Oral 3 times per day    lidocaine-EPINEPHrine  20 mL IntraDERmal Once    atorvastatin  10 mg Oral Daily    heparin (porcine)  5,000 Units SubCUTAneous 3 times per day    lisinopril  5 mg Oral Daily    miconazole   Topical BID    cefepime  2,000 mg IntraVENous Q12H    fluconazole  200 mg Oral Daily    sodium chloride flush  5-40 mL IntraVENous 2 times per day    multivitamin  1 tablet Oral Daily    thiamine  100 mg Oral Daily     Continuous Infusions:   sodium chloride 25 mL (05/11/22 2103)     PRN Meds:naloxone, acetaminophen **OR** acetaminophen, ondansetron, nicotine, sodium chloride flush, sodium chloride, naloxone    Review of Systems  Pertinent items are noted in HPI. REVIEW OF SYSTEMS:         · Constitutional: Denies fever, sweats, weight loss     · Eyes: No visual changes or diplopia. No scleral icterus. · ENT: No Headaches, hearing loss or vertigo. No mouth sores or sore throat. · Cardiovascular: No chest pain, dyspnea on exertion, palpitations or loss of consciousness. · Respiratory: No cough or wheezing, no sputum production. No hemoptysis. .    · Gastrointestinal: No abdominal pain, appetite loss, blood in stools. No change in bowel habits. · Genitourinary: No dysuria, trouble voiding, or hematuria. · Musculoskeletal:  Generalized weakness. No joint complaints. · Integumentary: No rash or pruritis. · Neurological: No headache, diplopia. No change in gait, balance, or coordination. No paresthesias. · Endocrine: No temperature intolerance. No excessive thirst, fluid intake, or urination. · Hematologic/Lymphatic: No abnormal bruising or ecchymoses, blood clots or swollen lymph nodes. · Allergic/Immunologic: No nasal congestion or hives. ·     Objective:     Patient Vitals for the past 8 hrs:   BP Temp Temp src Pulse Resp SpO2 Weight   05/12/22 0552 -- -- -- -- -- -- 249 lb 11.2 oz (113.3 kg)   05/12/22 0400 117/66 98 °F (36.7 °C) Oral 92 13 93 % --   05/12/22 0326 -- -- -- -- 12 96 % --   05/11/22 2345 -- -- -- -- 17 91 % --   05/11/22 2343 125/73 97.6 °F (36.4 °C) Oral 90 22 91 % --     I/O last 3 completed shifts: In: 1320 [P.O.:1320]  Out: 3600 [Urine:3600]  No intake/output data recorded.     /66   Pulse 92   Temp 98 °F (36.7 °C) (Oral)   Resp 13   Ht 5' 6\" (1.676 m)   Wt 249 lb 11.2 oz (113.3 kg)   SpO2 93%   BMI 40.30 kg/m²     General Appearance:    Alert, cooperative, no distress, appears stated age   Head:    Normocephalic, without obvious abnormality, atraumatic   Eyes:    PERRL, conjunctiva/corneas clear, EOM's intact, fundi     benign, both eyes        Ears:    Normal TM's and external ear canals, both ears   Nose:   Nares normal, septum midline, mucosa normal, no drainage    or sinus tenderness   Throat:   Lips, mucosa, and tongue normal; teeth and gums normal   Neck:   Supple, symmetrical, trachea midline, no adenopathy;        thyroid:  No enlargement/tenderness/nodules; no carotid    bruit or JVD   Back:     Symmetric, no curvature, ROM normal, no CVA tenderness   Lungs:     Clear to auscultation bilaterally, respirations unlabored   Chest wall:    No tenderness or deformity   Heart:    Regular rate and rhythm, S1 and S2 normal, no murmur, rub   or gallop   Abdomen:     Soft, non-tender, bowel sounds active all four quadrants,     no masses, no organomegaly           Extremities:   Extremities normal, atraumatic, no cyanosis or edema   Pulses:   2+ and symmetric all extremities   Skin:   Skin color, texture, turgor normal, no rashes or lesions   Lymph nodes: Cervical, supraclavicular, and axillary nodes normal   Neurologic:   CNII-XII intact. Normal strength, sensation and reflexes       throughout       Data Review  CBC:   Lab Results   Component Value Date    WBC 5.2 05/12/2022    RBC 4.86 05/12/2022       Assessment:     Principal Problem:    Suspected CHF (congestive heart failure)  Active Problems:    Malignancy (HCC)    Leg swelling    Cellulitis    New onset of congestive heart failure (HCC)    Acute on chronic systolic heart failure (HCC)    Type 2 diabetes mellitus without complication, without long-term current use of insulin (HCC)    Aortic atherosclerosis (HCC)    Nicotine dependence    Iron deficiency anemia  Resolved Problems:    * No resolved hospital problems. *      Plan:     1. 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  I will speak with Dr. Elle Ornelas. Treatment on these is typically surgery followed by adjuvant radiation therapy. These tumors tend not to be very chemotherapy responsive. I will speak with Dr. Elle Ornelas and go from there. She has finally agreed to a bone scan. 2.  Possible CHF. Per cardiology. 3.  Infectious disease. She is afebrile.   Continue antibiotics        Electronically signed by Wanda Bhatia MD on 5/12/2022 at 7:40 AM

## 2022-05-12 NOTE — PROGRESS NOTES
Infectious Disease Follow up Notes  Admit Date: 5/8/2022  Hospital Day: 5    Antibiotics :   IV Cefepime  Oral Flagyl  Fluconazole      CHIEF COMPLAINT:     CHF  Rt breast mass  Cellulitis  Hypoxic resp failure  Concern for Breast cancer - Phyllodes tumor     Subjective interval History :  55 y.o. woman not seen physicians for long time now admitted with lower leg swelling, dizziness, Rt breast fungating mass with odor and drainage she also has Left breast swelling with on going fungal dermatitis, bi lateral lower leg edema with poor skin hygiene and poor dentition. She has morbid obesity BMI at  41, she is on high flow oxygen since admit due to sob. She was seen by Oncology and work up in progress for possible breast cancer. Pt has know breast mass for years but did not seek medical attention due to fear and denial per family at bed side. Ct chest/abd/pelvis results noted with anasarca, hepatomegaly and bi lateral axillary adenopathy with Rt Breast fungating mass. Location :Rt breast mass, pain, drainage odor +        Quality :aching        Severity  8/10:     Duration :months       Timing : intermittent   Context : Fungating breast mass     Modifying factors : none   Associated signs and symptoms: sob , cough , dizziness       Interval History : Ongoing shortness of breath using 6 L nasal cannula, but less edematous and no chills no fevers and bone scant completed -      Past Medical History:    Past Medical History:   Diagnosis Date    Breast cancer ([facility] [address]        Past Surgical History:    History reviewed. No pertinent surgical history. Current Medications:    No outpatient medications have been marked as taking for the 5/8/22 encounter [facility] Encounter). Allergies:  Patient has no known allergies. Immunizations : There is no immunization history on file for this patient.     Social History:    Social History Tobacco Use    Smoking status: Current Every Day Smoker     Packs/day: 1.50     Years: 43.00     Pack years: 64.50    Smokeless tobacco: Never Used   Substance Use Topics    Alcohol use: Yes     Alcohol/week: 1.0 - 3.0 standard drink     Types: 1 - 3 Cans of beer per week     Comment: 05/07/22 last drink    Drug use: Not on file     Social History     Tobacco Use   Smoking Status Current Every Day Smoker    Packs/day: 1.50    Years: 43.00    Pack years: 64.50   Smokeless Tobacco Never Used      Family History : no DVT no copd      REVIEW OF SYSTEMS:      Constitutional:  negative for fevers, chills, night sweats  Eyes:  negative for blurred vision, eye discharge, visual disturbance   HEENT:  negative for hearing loss, ear drainage,nasal congestion  Respiratory:    cough ++ , shortness of breath ++  or hemoptysis   Cardiovascular:  negative for chest pain, palpitations, syncope  Gastrointestinal:  negative for nausea, vomiting, diarrhea, constipation, abdominal pain  Genitourinary:  negative for frequency, dysuria, urinary incontinence, hematuria  Hematologic/Lymphatic:  negative for easy bruising, bleeding and lymphadenopathy  Allergic/Immunologic:  negative for recurrent infections, angioedema, anaphylaxis   Endocrine:  negative for weight changes, polyuria, polydipsia and polyphagia  Musculoskeletal:  Rt breast fungating mass + lower leg edema++   Integumentary: No rashes, skin lesions  Neurological:  negative for headaches, slurred speech, unilateral weakness  Psychiatric: negative for hallucinations,confusion,agitation.        PHYSICAL EXAM:      Vitals:    /62   Pulse 92   Temp 97.8 °F (36.6 °C) (Oral)   Resp 13   Ht 5' 6\" (1.676 m)   Wt 249 lb 11.2 oz (113.3 kg)   SpO2 93%   BMI 40.30 kg/m²     General Appearance: alert,in some  acute distress, ++  pallor, no icterus  On nasal cannula + poor dentition ++  Skin: warm and dry, no rash or erythema  Head: normocephalic and atraumatic  Eyes: pupils equal, round, and reactive to light, conjunctivae normal  ENT: tympanic membrane, external ear and ear canal normal bilaterally, nose without deformity, nasal mucosa and turbinates normal without polyps  Neck: supple and non-tender without mass, no thyromegaly  no cervical lymphadenopathy  Pulmonary/Chest: Bi basal crepts++  wheezes, rales or rhonchi, normal air movement, in some  respiratory distress  Cardiovascular: normal rate, regular rhythm, normal S1 and S2, no murmurs, rubs, clicks, or gallops, no carotid bruits  Abdomen: soft, non-tender, non-distended, normal bowel sounds, no masses or organomegaly  Extremities: no cyanosis, clubbing or ++  edema  Musculoskeletal: normal range of motion, no joint swelling, deformity or tenderness  Integumentary: No rashes, no abnormal skin lesions, no petechiae  Neurologic: reflexes normal and symmetric, no cranial nerve deficit  Psych:  Orientation, sensorium, mood normal            Lines:IV  Rt breast fungating mass++ odor+ drainage  Left beast edema and fungal rash ++         Data Review:    CBC:   Lab Results   Component Value Date    WBC 5.2 05/12/2022    HGB 11.0 (L) 05/12/2022    HCT 36.3 05/12/2022    MCV 74.6 (L) 05/12/2022     05/12/2022     RENAL:   Lab Results   Component Value Date    CREATININE 0.8 05/12/2022    BUN 11 05/12/2022     05/12/2022    K 3.7 05/12/2022    CL 95 (L) 05/12/2022    CO2 35 (H) 05/12/2022     SED RATE: No results found for: SEDRATE  CK: No results found for: CKTOTAL  CRP: No results found for: CRP  Hepatic Function Panel:   Lab Results   Component Value Date    ALKPHOS 93 05/08/2022    ALT 6 05/08/2022    AST 11 05/08/2022    PROT 6.9 05/08/2022    PROT 8.1 01/22/2011    BILITOT 1.1 05/08/2022    LABALBU 3.4 05/08/2022     UA:  Lab Results   Component Value Date    COLORU Yellow 05/08/2022    CLARITYU CLOUDY 05/08/2022    GLUCOSEU Negative 05/08/2022    BILIRUBINUR Negative 05/08/2022    KETUA Negative 05/08/2022 SPECGRAV 1.005 05/08/2022    BLOODU Negative 05/08/2022    PHUR 5.0 05/09/2022    PROTEINU Negative 05/08/2022    UROBILINOGEN 1.0 05/08/2022    NITRU Negative 05/08/2022    LEUKOCYTESUR Negative 05/08/2022    LABMICR YES 05/08/2022    URINETYPE Other 05/08/2022      Urine Microscopic:   Lab Results   Component Value Date    BACTERIA None Seen 05/08/2022    HYALCAST 1 05/08/2022    WBCUA 1 05/08/2022    RBCUA 1 05/08/2022    EPIU 3 05/08/2022     Urine Reflex to Culture:   Lab Results   Component Value Date    URRFLXCULT Not Indicated 05/08/2022         MICRO: cultures reviewed and updated by me   Blood Culture:   Lab Results   Component Value Date    [facility] SYSTEM  05/08/2022     No Growth to date. Any change in status will be called. BLOODCULT2  05/08/2022     No Growth to date. Any change in status will be called. Respiratory Culture:  Lab Results   Component Value Date    CULTRESP Normal respiratory alejandro 05/09/2022    LABGRAM  05/10/2022     1+ Gram positive cocci  1+ Gram positive rods  1+ WBC's (Polymorphonuclear)       AFB:No results found for: AFBSMEAR  Viral Culture:  No results found for: COVID19  Urine Culture: No results for input(s): Lesa Brown in the last 72 hours. IMAGING:    NM BONE SCAN WHOLE BODY   Final Result   No generalized scintigraphic pattern of osseous metastatic disease. Soft tissue anasarca. VL Extremity Venous Bilateral   Final Result      CT CHEST ABDOMEN PELVIS W CONTRAST   Final Result   1. CT CHEST: Mild congestive heart failure. 2. Nonspecific main pulmonary artery dilatation can be seen with pulmonary   hypertension. 3. Mild atelectasis bilateral lower lungs and small volume right pleural   effusion. Pneumonia is a consideration. 4. Large fungating right breast mass with diffuse right breast skin   thickening almost certainly primary breast cancer. 5. Bilateral axillary lymph node enlargement may be reactive or reflect   metastatic disease. 6. Anasarca. 7. CT ABDOMEN/PELVIS: No acute abnormality. 8. Nonspecific left adrenal 1.5 cm nodule. Follow-up noncontrast CT abdomen   no sooner than 48 hours from contrast administration recommended. 9. Nonspecific mild adenopathy right upper quadrant common with hepatic   steatosis, probably reactive. 10. Nonspecific splenomegaly. 11. Hepatic steatosis and hepatomegaly. 12. Anasarca. XR CHEST PORTABLE   Final Result   Pulmonary vascular congestion/interstitial edema. Cardiomegaly.                All the pertinent images and reports for the current Hospitalization were reviewed by me     Scheduled Meds:   neomycin-bacitracin-polymyxin   Topical Daily    furosemide  40 mg IntraVENous TID    iron sucrose  200 mg IntraVENous Q24H    metoprolol succinate  25 mg Oral Daily    metroNIDAZOLE  500 mg Oral 3 times per day    lidocaine-EPINEPHrine  20 mL IntraDERmal Once    atorvastatin  10 mg Oral Daily    heparin (porcine)  5,000 Units SubCUTAneous 3 times per day    lisinopril  5 mg Oral Daily    miconazole   Topical BID    cefepime  2,000 mg IntraVENous Q12H    fluconazole  200 mg Oral Daily    sodium chloride flush  5-40 mL IntraVENous 2 times per day    multivitamin  1 tablet Oral Daily    thiamine  100 mg Oral Daily       Continuous Infusions:   sodium chloride 25 mL (05/11/22 2103)       PRN Meds:  naloxone, acetaminophen **OR** acetaminophen, ondansetron, nicotine, sodium chloride flush, sodium chloride, naloxone      Assessment:     Patient Active Problem List   Diagnosis    Suspected CHF (congestive heart failure)    Malignancy ([address]    Leg swelling    Cellulitis    New onset of congestive heart failure ([address]    Acute on chronic systolic heart failure (HCC)    Type 2 diabetes mellitus without complication, without long-term current use of insulin (HCC)    Aortic atherosclerosis (HCC)    Nicotine dependence    Iron deficiency anemia     Acute Hypoxic resp failure  CHF  Anasarca  Suspect COPD with significant Smoking history   Obesity BMI at  39  Rt breast Fungating mass +  Left breast fungal dermatitis  DM new diagnosis  Smoking ++  Alcohol abuse  Lower leg edema  CT chest with fungating mass and axillary adenopathy         Seen by Multiple services due to multiple medical issues main concern is the locally advanced fungating mass Rt breast concern for Malignancy and biopsy pending and she is volume over loaded and hypoxic     Will need IV abx for the secondary infection due to fungating mass willl be able to choose oral abx once clinically better     She is now completed  Nuclear scan test and its negative   Biopsy confirmed Phyllloides tumor and seen by [address] [place] [address] Rd, Microbiology, Radiology and all the pertinent results from current hospitalization and  care every where were reviewed  by me as a part of the evaluation   Plan:   1. Cont IV Cefepime x 2 gm q 12 hrs as in  Patient   2. Cont  oral Flagyl x 500 mg q 8 HR  3. Wound cx mixed skin alejandro   4. Oral Fluconazole x 200 mg   5. Resp cx  -ve  6. Breast biopsy path Phylloides tumor  7. HIV  -ve  and hepatitis screen -ve  8. Quit smoking   9. Watch for DTS   10 Rt leg Kerlix and ace wraps    11. Will change to oral Augmentin x 875 mg twice a day x for x 7 days and cont oral Fluconazole x 200 mg Q 24 hrx      Discussed with patient/Family and Nursing   Risk of Complications/Morbidity: High      · Illness(es)/ Infection present that pose threat to bodily function. · There is potential for severe exacerbation of infection/side effects of treatment. · Therapy requires intensive monitoring for antimicrobial agent toxicity    Discussed with patient/Family and Nursing staff     Thanks for allowing me to participate in your patient's care and please call me with any questions or concerns.     Rik Ojeda MD  Infectious Disease  [facility] Physician  Phone: [phone]   Fax : [phone]

## 2022-05-12 NOTE — PROGRESS NOTES
Breast Surgery    Right breast biopsy shows a borderline phyllodes tumor. Discussed with oncology, Dr. Barry Cote. Will plan on surgical excision, probable right modified radical mastectomy, in a few weeks. Discussed with patient and her family. They will follow up with me in the office in 1-2 weeks. ([phone]).      Electronically signed by Edd Pitts MD on 5/12/2022 at 3:59 PM

## 2022-05-12 NOTE — PROGRESS NOTES
Pulmonary Progress Note    Date of Admission: 5/8/2022   LOS: 4 days       CC:  shortness of breath     Subjective:  Patient continues to feel better. Weaned to 6 L nasal cannula. Wishing to be discharged tomorrow or Saturday morning secondary to having a job. She can be discharged on oxygen to complete her job    ROS:   No nausea  No Vomiting  No chest pain       Assessment:          Plan: This note may have been transcribed using [id number] [facility]. Please disregard any translational errors. Hospital Day: 4     Breast cancer  *Oncology consult     Acute hypoxemic respiratory failure saturation less than 90% on room air  *Currently weaned to 6 L nasal cannula. Continue to wean to 90% saturation     Acute hypercapnic respiratory failure on chronic hypercapnic respiratory failure  *Resolved BiPAP  *With chronic hypercapnic respiratory failure, allow serum bicarb to maintain approximately 35. Bicarb controlled. Diamox if renal bicarb greater than 35     Right pleural effusion with possible pulmonary nodule  *Concern for metastasis. Too small for thoracentesis. Monitor.     Abnormal radiograph  * Patient has right-sided atelectasis. Pneumonia is possible with concerns for metastasis. *Antibiotics per   Tobacco abuse  *Significant tobacco history. COPD is possible but no PFTs on chart. *Must quit       Patient wishes to be discharged by Saturday morning. Given her rate of improvement, this seems reasonable from a pulmonary standpoint. Okay to discharge on 4 L or less. Patient has a job that will allow her to work on oxygen. Data:        PHYSICAL EXAM:   Blood pressure 100/62, pulse 92, temperature 97.8 °F (36.6 °C), temperature source Oral, resp. rate 13, height 5' 6\" (1.676 m), weight 249 lb 11.2 oz (113.3 kg), SpO2 93 %.'  Body mass index is 40.3 kg/m². Gen: No distress. ENT:   Resp: No accessory muscle use. No crackles. No wheezes. No rhonchi. CV: Regular rate.  Regular rhythm. No murmur or rub. No edema. Skin: Warm, dry, normal texture and turgor. No nodule on exposed extremities. M/S: No cyanosis. No clubbing. No joint deformity. Psych: Oriented x 3. No anxiety. Awake. Alert. Intact judgement and insight. Good Mood / Affect. Memory appears in tact       Medications:    Scheduled Meds:   neomycin-bacitracin-polymyxin   Topical Daily    furosemide  40 mg IntraVENous TID    iron sucrose  200 mg IntraVENous Q24H    metoprolol succinate  25 mg Oral Daily    metroNIDAZOLE  500 mg Oral 3 times per day    lidocaine-EPINEPHrine  20 mL IntraDERmal Once    atorvastatin  10 mg Oral Daily    heparin (porcine)  5,000 Units SubCUTAneous 3 times per day    lisinopril  5 mg Oral Daily    miconazole   Topical BID    cefepime  2,000 mg IntraVENous Q12H    fluconazole  200 mg Oral Daily    sodium chloride flush  5-40 mL IntraVENous 2 times per day    multivitamin  1 tablet Oral Daily    thiamine  100 mg Oral Daily       Continuous Infusions:   sodium chloride 25 mL (05/11/22 2103)       PRN Meds:  naloxone, acetaminophen **OR** acetaminophen, ondansetron, nicotine, sodium chloride flush, sodium chloride, naloxone    Labs reviewed:  CBC:   Recent Labs     05/10/22  0627 05/11/22  0550 05/12/22  0536   WBC 7.1 6.0 5.2   HGB 11.5* 11.5* 11.0*   HCT 37.7 37.6 36.3   MCV 75.0* 74.4* 74.6*    217 203     BMP:   Recent Labs     05/10/22  0627 05/11/22  0550 05/12/22  0536    143 141   K 3.8 4.0 3.7   CL 99 98* 95*   CO2 33* 36* 35*   BUN 8 11 11   CREATININE 0.9 0.9 0.8     LIVER PROFILE:   No results for input(s): AST, ALT, LIPASE, BILIDIR, BILITOT, ALKPHOS in the last 72 hours. Invalid input(s): AMYLASE,  ALB  PT/INR:   No results for input(s): PROTIME, INR in the last 72 hours. APTT:   No results for input(s): APTT in the last 72 hours.   UA:  No results for input(s): NITRITE, COLORU, PHUR, LABCAST, WBCUA, RBCUA, MUCUS, TRICHOMONAS, YEAST, BACTERIA, CLARITYU, Dorsey Spies, UROBILINOGEN, BILIRUBINUR, BLOODU, GLUCOSEU, AMORPHOUS in the last 72 hours. Invalid input(s): Bella Gama  No results for input(s): PH, PCO2, PO2 in the last 72 hours. Cx:      Films: This note was transcribed using [id number] [employer]. Please disregard any translational errors.       Eddie Boo Pulmonary, Sleep and [facility] [phone]

## 2022-05-12 NOTE — PROGRESS NOTES
This RN spoke with nuclear medicine about order for pt to have bone scan done today. NM expressed concerns that pt has refused the scan twice since admission. This RN explained the process of the scan and pt states she wants to have it done today. NM notified.     Electronically signed by Zee Ibarra RN on 5/12/2022 at 6:39 AM

## 2022-05-12 NOTE — PROGRESS NOTES
Referring Physician: Dr. Keegan Jean  Reason for Consultation: \"Suspected CHF\"  Chief Complaint: Short of breath    Subjective:   History of Present Illness:  Meaghan See is a 55 y.o. patient who presented to the hospital with complaints of worsening lower extremity edema and shortness of breath. The patient did not routinely follow with a physician. She notes lower extremity edema for the past several years but did not seek medical attention. The swelling would wax and wane in intensity but would become so severe at times that she could not bend her knees or move her ankles. She would have skin breakdown at times on her lower extremities. However over the last few weeks, she has been having worsening shortness of breath. She states that she will sleep upright in bed or sitting at the edge of her bed secondary to PND/orthopnea. She says the real reason she came to the hospital was that she fell out of bed and her leg was trapped in an awkward position that she thought she may have injured her leg. [facility] was also consulted as she has a large fungating right breast mass. She first noticed 2 \"marble sized\" masses in her right breast approximately 10 years ago. For over 1 year, she says the mass on her chest will open up periodically and bleed but she has never sought medical attention. She was started on IV Lasix and notes improvement in the edema. She has generalized anasarca. Pulmonary was also consulted as the patient had hypercapnic respiratory failure requiring BiPAP there are no family members present bedside but per report the patient had been confused for several days prior to hospitalization. She endorses smoking. Interval history:  No acute overnight cardiac events. Patient's mother and sister are present bedside. She is still quite edematous but says the swelling is improving. She also feels the shortness of breath is improving. She denies associated chest pains.   The patient changed her mind about the treatment of breast cancer. She underwent her bone scan today. Past Medical History:   has a past medical history of Breast cancer ([address] Surgical History:  Denies prior cardiac surgery or coronary intervention. Social History:   reports that she has been smoking. She has a 64.50 pack-year smoking history. She has never used smokeless tobacco. She reports current alcohol use of about 1.0 - 3.0 standard drink of alcohol per week. Family History:  Denies premature coronary atherosclerosis. Home Medications:  Were reviewed and are listed in nursing record and/or below  Prior to Admission medications    Not on File        CURRENT Medications:  neomycin-bacitracin-polymyxin (NEOSPORIN) ointment, Daily  furosemide (LASIX) injection 40 mg, TID  iron sucrose (VENOFER) injection 200 mg, Q24H  metoprolol succinate (TOPROL XL) extended release tablet 25 mg, Daily  naloxone (NARCAN) injection 2 mg, PRN  metroNIDAZOLE (FLAGYL) tablet 500 mg, 3 times per day  lidocaine-EPINEPHrine 1 %-1:079200 injection 20 mL, Once  atorvastatin (LIPITOR) tablet 10 mg, Daily  acetaminophen (TYLENOL) tablet 650 mg, Q6H PRN   Or  acetaminophen (TYLENOL) suppository 650 mg, Q6H PRN  ondansetron (ZOFRAN) injection 4 mg, Q6H PRN  heparin (porcine) injection 5,000 Units, 3 times per day  lisinopril (PRINIVIL;ZESTRIL) tablet 5 mg, Daily  miconazole (MICOTIN) 2 % powder, BID  nicotine (NICODERM CQ) 21 MG/24HR 1 patch, Daily PRN  cefepime (MAXIPIME) 2000 mg IVPB minibag, Q12H  fluconazole (DIFLUCAN) tablet 200 mg, Daily  sodium chloride flush 0.9 % injection 5-40 mL, 2 times per day  sodium chloride flush 0.9 % injection 5-40 mL, PRN  0.9 % sodium chloride infusion, PRN  multivitamin 1 tablet, Daily  thiamine mononitrate tablet 100 mg, Daily  naloxone (NARCAN) injection 0.4 mg, PRN      Allergies:  Patient has no known allergies. Review of Systems:   · Constitutional: no unanticipated weight loss.  There's been no change in energy level, sleep pattern, or activity level. No fevers, chills. · Eyes: No visual changes or diplopia. No scleral icterus. · ENT: No Headaches, hearing loss or vertigo. No mouth sores or sore throat. · Cardiovascular: as reviewed in HPI  · Respiratory: No cough or wheezing, no sputum production. No hemoptysis. · Gastrointestinal: No abdominal pain, appetite loss, blood in stools. No change in bowel or bladder habits. · Genitourinary: No dysuria, trouble voiding, or hematuria. · Musculoskeletal:  No gait disturbance, no joint complaints. · Integumentary: Right breast mass with redness. · Neurological: No headache, diplopia, change in muscle strength, numbness or tingling. · Psychiatric: No anxiety or depression. · Endocrine: No temperature intolerance. No excessive thirst, fluid intake, or urination. No tremor. · Hematologic/Lymphatic: No abnormal bruising or bleeding, blood clots or swollen lymph nodes. · Allergic/Immunologic: No nasal congestion or hives. Objective:   PHYSICAL EXAM:    Vitals:    05/12/22 1220   BP: 100/62   Pulse: 92   Resp: 14   Temp: 97.8   SpO2: 95%    Weight: 249 lb 11.2 oz (113.3 kg)       General Appearance:  Alert, cooperative, no respiratory distress, appears stated age. Wearing O2. Head:  Normocephalic, without obvious abnormality, atraumatic. Eyes:  Pupils equal and round. No scleral icterus. Mouth: Moist mucosa, no pharyngeal erythema. Nose: Nares normal. No drainage or sinus tenderness. Neck: Supple, symmetrical, trachea midline. No adenopathy. No tenderness/mass/nodules. No carotid bruit or elevated JVD. Lungs:   Respirations unlabored. Bibasilar crackles. Chest Wall:  No tenderness or deformity. Heart:  Regular rate. S1/S2 normal. No murmur, rub, or gallop. Abdomen:   Soft, non-tender, bowel sounds active. Musculoskeletal: No muscle wasting or digital clubbing. Extremities: Extremities normal, atraumatic. No cyanosis. 2+ BLE edema.  + Anasarca   Pulses: 2+ radial and carotid pulses, symmetric. Skin: Fungating right breast mass. Pysch: Normal mood and affect. Alert and oriented x 4. Poor insight. Neurologic: Normal gross motor. Follows commands. Labs     CBC:   Lab Results   Component Value Date    WBC 5.2 05/12/2022    RBC 4.86 05/12/2022    HGB 11.0 05/12/2022    HCT 36.3 05/12/2022    MCV 74.6 05/12/2022    RDW 18.8 05/12/2022     05/12/2022     CMP:  Lab Results   Component Value Date     05/12/2022    K 3.7 05/12/2022    K 3.9 05/08/2022    CL 95 05/12/2022    CO2 35 05/12/2022    BUN 11 05/12/2022    CREATININE 0.8 05/12/2022    GFRAA >60 05/12/2022    GFRAA >60 01/22/2011    AGRATIO 1.0 05/08/2022    LABGLOM >60 05/12/2022    GLUCOSE 108 05/12/2022    PROT 6.9 05/08/2022    PROT 8.1 01/22/2011    CALCIUM 8.6 05/12/2022    BILITOT 1.1 05/08/2022    ALKPHOS 93 05/08/2022    AST 11 05/08/2022    ALT 6 05/08/2022     PT/INR:  No results found for: PTINR  HgBA1c:  Lab Results   Component Value Date    LABA1C 7.0 05/09/2022     Lab Results   Component Value Date    TROPONINI 0.01 05/08/2022       Cardiac Data     EKG: Personally interpreted. 5/8/2022. Sinus tachycardia. Low voltage QRS complex. Nonspecific T wave abnormality. Echo: 5/11/22. Left ventricular cavity size is normal.  Normal left ventricular wall thickness. Ejection fraction is visually estimated to be 55-60%. No regional wall motion abnormalities are noted. Indeterminate diastolic function. Measurement attempted while definity was administered. right ventricular appears mildly enlarged with possible mildly reduced RV function  Unable to obtain doppler, M-Mode and RV TDI  Right ventricular septum flattened in systole and diastole consistent with RV pressure and volume overload. Mild pulmonic regurgitation present. Telemetry: Personally interpreted. Sinus.     Assessment and Plan   1) Acute on chronic diastolic heart failure. EF 55-60%. NYHA III. Patient appears hypervolemic. Continue IV Lasix. Continue ACE-I and Toprol-XL. Weight 263->249. 2) Acute on chronic hypoxic and hypercapnic respiratory failure. Pulmonary consulted. Diurese as tolerated. Wean O2 as tolerated. 3) Breast cancer. [facility] consulted. Patient now says she is agreeable to treating the cancer and having surgery. Would recommend continuing to defer surgery in the short-term until more compensated from her CHF. 4) CAD risk equivalent with type 2 diabetes mellitus/aortic atherosclerosis. Diabetes management per primary team.  Continue statin therapy. 5) Nicotine Addiction. Encouraged smoking cessation but patient is in the contemplative stage. 6) Morbid obesity. BMI 40. Encourage weight loss. 7) Iron deficiency anemia. Ferritin only 17. Will order IV iron but defer additional work-up/treatment to primary team.    Overall, the problems requiring hospitalization are high in severity. Thank you for allowing us to participate in the care of Violette Marc. Gina Gonzalez.  Hernandez Cloud, [address] [place] [address]  5/12/2022 2:49 PM

## 2022-05-12 NOTE — PLAN OF CARE
of infection  Recent Flowsheet Documentation  Taken 5/11/2022 2343 by Toyin Reid RN  Incisions, Wounds, or Drain Sites Healing Without Sign and Symptoms of Infection: ADMISSION and DAILY: Assess and document risk factors for pressure ulcer development

## 2022-05-13 LAB
ANION GAP SERPL CALCULATED.3IONS-SCNC: 12 MMOL/L (ref 3–16)
BASOPHILS ABSOLUTE: 0 K/UL (ref 0–0.2)
BASOPHILS RELATIVE PERCENT: 0.7 %
BUN BLDV-MCNC: 12 MG/DL (ref 7–20)
CALCIUM SERPL-MCNC: 9 MG/DL (ref 8.3–10.6)
CHLORIDE BLD-SCNC: 95 MMOL/L (ref 99–110)
CO2: 36 MMOL/L (ref 21–32)
CREAT SERPL-MCNC: 0.9 MG/DL (ref 0.6–1.1)
EOSINOPHILS ABSOLUTE: 0.2 K/UL (ref 0–0.6)
EOSINOPHILS RELATIVE PERCENT: 2.6 %
GFR AFRICAN AMERICAN: >60
GFR NON-AFRICAN AMERICAN: >60
GLUCOSE BLD-MCNC: 89 MG/DL (ref 70–99)
GRAM STAIN RESULT: ABNORMAL
HCT VFR BLD CALC: 35 % (ref 36–48)
HEMOGLOBIN: 10.8 G/DL (ref 12–16)
LYMPHOCYTES ABSOLUTE: 1 K/UL (ref 1–5.1)
LYMPHOCYTES RELATIVE PERCENT: 15.7 %
MAGNESIUM: 1.5 MG/DL (ref 1.8–2.4)
MCH RBC QN AUTO: 22.8 PG (ref 26–34)
MCHC RBC AUTO-ENTMCNC: 31 G/DL (ref 31–36)
MCV RBC AUTO: 73.5 FL (ref 80–100)
MONOCYTES ABSOLUTE: 0.8 K/UL (ref 0–1.3)
MONOCYTES RELATIVE PERCENT: 12.7 %
NEUTROPHILS ABSOLUTE: 4.4 K/UL (ref 1.7–7.7)
NEUTROPHILS RELATIVE PERCENT: 68.3 %
ORGANISM: ABNORMAL
ORGANISM: ABNORMAL
PDW BLD-RTO: 19.1 % (ref 12.4–15.4)
PLATELET # BLD: 231 K/UL (ref 135–450)
PMV BLD AUTO: 7.4 FL (ref 5–10.5)
POTASSIUM SERPL-SCNC: 3.6 MMOL/L (ref 3.5–5.1)
PROCALCITONIN: 0.14 NG/ML (ref 0–0.15)
RBC # BLD: 4.76 M/UL (ref 4–5.2)
SODIUM BLD-SCNC: 143 MMOL/L (ref 136–145)
WBC # BLD: 6.5 K/UL (ref 4–11)
WOUND/ABSCESS: ABNORMAL

## 2022-05-13 PROCEDURE — 6360000002 HC RX W HCPCS: Performed by: INTERNAL MEDICINE

## 2022-05-13 PROCEDURE — 99232 SBSQ HOSP IP/OBS MODERATE 35: CPT | Performed by: INTERNAL MEDICINE

## 2022-05-13 PROCEDURE — 83735 ASSAY OF MAGNESIUM: CPT

## 2022-05-13 PROCEDURE — 6360000002 HC RX W HCPCS: Performed by: STUDENT IN AN ORGANIZED HEALTH CARE EDUCATION/TRAINING PROGRAM

## 2022-05-13 PROCEDURE — 85025 COMPLETE CBC W/AUTO DIFF WBC: CPT

## 2022-05-13 PROCEDURE — 99233 SBSQ HOSP IP/OBS HIGH 50: CPT | Performed by: INTERNAL MEDICINE

## 2022-05-13 PROCEDURE — 2580000003 HC RX 258: Performed by: STUDENT IN AN ORGANIZED HEALTH CARE EDUCATION/TRAINING PROGRAM

## 2022-05-13 PROCEDURE — 6360000002 HC RX W HCPCS: Performed by: NURSE PRACTITIONER

## 2022-05-13 PROCEDURE — 80048 BASIC METABOLIC PNL TOTAL CA: CPT

## 2022-05-13 PROCEDURE — 6370000000 HC RX 637 (ALT 250 FOR IP): Performed by: INTERNAL MEDICINE

## 2022-05-13 PROCEDURE — 2700000000 HC OXYGEN THERAPY PER DAY

## 2022-05-13 PROCEDURE — 2060000000 HC ICU INTERMEDIATE R&B

## 2022-05-13 PROCEDURE — 36415 COLL VENOUS BLD VENIPUNCTURE: CPT

## 2022-05-13 PROCEDURE — 94761 N-INVAS EAR/PLS OXIMETRY MLT: CPT

## 2022-05-13 PROCEDURE — 84145 PROCALCITONIN (PCT): CPT

## 2022-05-13 PROCEDURE — 6370000000 HC RX 637 (ALT 250 FOR IP): Performed by: STUDENT IN AN ORGANIZED HEALTH CARE EDUCATION/TRAINING PROGRAM

## 2022-05-13 RX ORDER — MAGNESIUM SULFATE IN WATER 40 MG/ML
4000 INJECTION, SOLUTION INTRAVENOUS ONCE
Status: COMPLETED | OUTPATIENT
Start: 2022-05-13 | End: 2022-05-13

## 2022-05-13 RX ORDER — AMOXICILLIN AND CLAVULANATE POTASSIUM 875; 125 MG/1; MG/1
1 TABLET, FILM COATED ORAL EVERY 12 HOURS SCHEDULED
Status: DISCONTINUED | OUTPATIENT
Start: 2022-05-14 | End: 2022-05-17

## 2022-05-13 RX ORDER — POTASSIUM CHLORIDE 20 MEQ/1
40 TABLET, EXTENDED RELEASE ORAL ONCE
Status: COMPLETED | OUTPATIENT
Start: 2022-05-13 | End: 2022-05-13

## 2022-05-13 RX ADMIN — FUROSEMIDE 40 MG: 10 INJECTION, SOLUTION INTRAMUSCULAR; INTRAVENOUS at 08:40

## 2022-05-13 RX ADMIN — MICONAZOLE NITRATE: 2 POWDER TOPICAL at 08:39

## 2022-05-13 RX ADMIN — ATORVASTATIN CALCIUM 10 MG: 10 TABLET, FILM COATED ORAL at 08:40

## 2022-05-13 RX ADMIN — FUROSEMIDE 40 MG: 10 INJECTION, SOLUTION INTRAMUSCULAR; INTRAVENOUS at 15:40

## 2022-05-13 RX ADMIN — MICONAZOLE NITRATE: 2 POWDER TOPICAL at 21:09

## 2022-05-13 RX ADMIN — CEFEPIME HYDROCHLORIDE 2000 MG: 2 INJECTION, POWDER, FOR SOLUTION INTRAVENOUS at 08:45

## 2022-05-13 RX ADMIN — HEPARIN SODIUM 5000 UNITS: 5000 INJECTION INTRAVENOUS; SUBCUTANEOUS at 21:08

## 2022-05-13 RX ADMIN — THERA TABS 1 TABLET: TAB at 08:40

## 2022-05-13 RX ADMIN — HEPARIN SODIUM 5000 UNITS: 5000 INJECTION INTRAVENOUS; SUBCUTANEOUS at 15:40

## 2022-05-13 RX ADMIN — MAGNESIUM SULFATE HEPTAHYDRATE 4000 MG: 40 INJECTION, SOLUTION INTRAVENOUS at 10:14

## 2022-05-13 RX ADMIN — LISINOPRIL 5 MG: 5 TABLET ORAL at 08:40

## 2022-05-13 RX ADMIN — METRONIDAZOLE 500 MG: 500 TABLET ORAL at 15:40

## 2022-05-13 RX ADMIN — FLUCONAZOLE 200 MG: 100 TABLET ORAL at 21:08

## 2022-05-13 RX ADMIN — METOPROLOL SUCCINATE 25 MG: 25 TABLET, EXTENDED RELEASE ORAL at 08:40

## 2022-05-13 RX ADMIN — METRONIDAZOLE 500 MG: 500 TABLET ORAL at 05:45

## 2022-05-13 RX ADMIN — HEPARIN SODIUM 5000 UNITS: 5000 INJECTION INTRAVENOUS; SUBCUTANEOUS at 05:45

## 2022-05-13 RX ADMIN — FUROSEMIDE 40 MG: 10 INJECTION, SOLUTION INTRAMUSCULAR; INTRAVENOUS at 21:08

## 2022-05-13 RX ADMIN — Medication 10 ML: at 21:09

## 2022-05-13 RX ADMIN — METRONIDAZOLE 500 MG: 500 TABLET ORAL at 21:08

## 2022-05-13 RX ADMIN — POTASSIUM CHLORIDE 40 MEQ: 20 TABLET, EXTENDED RELEASE ORAL at 10:12

## 2022-05-13 RX ADMIN — POLYMYXIN B SULFATE, BACITRACIN ZINC, NEOMYCIN SULFATE: 5000; 3.5; 4 OINTMENT TOPICAL at 08:43

## 2022-05-13 RX ADMIN — THIAMINE HCL TAB 100 MG 100 MG: 100 TAB at 08:40

## 2022-05-13 ASSESSMENT — ENCOUNTER SYMPTOMS
CHOKING: 0
GASTROINTESTINAL NEGATIVE: 1
EYES NEGATIVE: 1
SHORTNESS OF BREATH: 1

## 2022-05-13 NOTE — CONSULTS
Referral for Heart Failure Services. Met with Carolina Ojeda. Agreeable. I introduced myself and described our services. Unsure of discharge plan at this time. Carolina Ojeda will likely need several appointments after discharge. I will wait to schedule our visit after these have been scheduled. If she is here Monday I will stop back . Otherwise I will call to schedule. I just feel she has other appointments that take priority. Prior to her admission she schedule a new PCP visit for June. She asked for recommendations for here at [facility]. I gave her numbers of some [address] here at [facility].     We talked about fluid and sodium restrictions as well as daily weights. She does not have a scale and was not straight forward about whether they could afford a scale. I asked our heart failure coordinator if we could please provide her scale. They agree and will take this to her. I will follow along. Carolina Ojeda seems motivated. Wants to quit smoking. No nicotine replacement while inpatient and doing okay smoke free. Provided encouragement and and some counseling.      Rosalie Villalobos, PharmD  [facility]  Heart Failure Service  [phone]

## 2022-05-13 NOTE — PROGRESS NOTES
PALLIATIVE MEDICINE PROGRESS NOTE     Patient name:Danielito Rangel    SSU:[id number] :1965  Room/Bed:Q8X-6781/5277-01    LOS: 5 days        ASSESSMENT/RECOMMENDATIONS     55 y.o. female with hypoxia and swelling.          Symptom Management:  1. Hypoxia: Patient on 5 liters of oxygen via a high flow nasal cannula for relief. 2. Swelling: Patient now on Lasix 40 mg scheduled TID. 3. Goals of Care: Met patient and her spouse Lianna Avitia at the bedside today. Patient continues to be oriented x 4. Reviewed patient's current course of care, health status, verified goals of care and verified code status. Patient indicated she would like to pursue all aggressive therapy at this time (including being resected by Dr. Acosta President if deemed to be medically necessary). The patient would like to remain a Full Code for her code status.       Patient/Family Goals of Care :     - Met patient and her spouse Lianna Avitia at the bedside today. Patient continues to be oriented x 4. Reviewed patient's current course of care, health status, verified goals of care and verified code status. Patient indicated she would like to pursue all aggressive therapy at this time (including being resected by Dr. Acosta President if deemed to be medically necessary). The patient would like to remain a Full Code for her code status.  - Again met the patient at her bedside. Patient's spouse iLanna Avitia and mother Emily were present for the visit today. Patient was again oriented x 4 and appeared to be decisional today. Again reviewed patient's current health condition, goals and code status. Patient indicated she would like to continue on with aggressive treatment at this time and hopes to be able to return home. Patient and her family would like to meet with [facility] post the patient's hospital stay to help assist with in home care.   Patient and her family have POA paperwork and indicated they intended to fill out and complete POA paperwork today (patient again intends to name her mother Emily as her POA). Code status is to remain a Full Code. 5/9 - Met patient and her mother Emily at the bedside today. Patient was lethargic but oriented x 4. Patient did appear to be decisional today. Reviewed patient's current health status, goals of care and code status. Patient indicated she is not interested in hospice services at this time and would like to pursue all aggressive care at this time. Code status was reviewed and the patient would like to remain a Full Code.     Disposition/Discharge Plan:   An outpatient [facility] referral was ordered for post-discharge to followup with the patient once they return home.     Advance Directives:  · Surrogate Decision Maker: Emily, patient's mother, is patient's POA. · Code status:  Full Code     Case discussed with: patient, Liane Apley (spouse)    Thank you for allowing us to participate in the care of this patient. SUBJECTIVE     Chief Complaint: Hypoxia    Last 24 hours:   Patient was again oriented x 4. Patient again indicated she would like to pursue all aggressive therapy at this time (including being resected by Dr. Justin Morris if deemed to be medically necessary). The patient would like to remain a Full Code for her code status. ROS:    Review of Systems   Constitutional: Positive for fatigue. HENT: Negative. Eyes: Negative. Respiratory: Positive for shortness of breath. Negative for choking. Cardiovascular: Positive for leg swelling. Negative for chest pain. Gastrointestinal: Negative. Musculoskeletal: Negative. Skin: Positive for pallor. Neurological: Positive for weakness. Psychiatric/Behavioral: Negative. All other systems reviewed and are negative. A complete 10 count ROS was obtained. Pertinent positives mentioned above in HPI/ROS. All others if not mentioned are negative.          OBJECTIVE   /70   Pulse 81   Temp 97.2 °F (36.2 °C) (Oral) Resp 18   Ht 5' 6\" (1.676 m)   Wt 244 lb (110.7 kg)   SpO2 96%   BMI 39.38 kg/m²   I/O last 3 completed shifts: In: 720 [P.O.:720]  Out: 4700 [Urine:4700]  I/O this shift:  In: 120 [P.O.:120]  Out: -       Physical Exam  Vitals reviewed. Constitutional:       Appearance: She is ill-appearing. Interventions: Nasal cannula in place. HENT:      Head: Normocephalic and atraumatic. Nose: Nose normal.   Eyes:      Extraocular Movements: Extraocular movements intact. Pupils: Pupils are equal, round, and reactive to light. Cardiovascular:      Rate and Rhythm: Normal rate. Pulses: Normal pulses. Pulmonary:      Comments: Breath sounds clear but diminished today. Abdominal:      General: Bowel sounds are normal.      Palpations: Abdomen is soft. Musculoskeletal:      Cervical back: Neck supple. Right lower leg: Edema (1+) present. Left lower leg: Edema (1+) present. Skin:     General: Skin is warm and dry. Coloration: Skin is pale. Neurological:      Comments: Patient was once again oriented x4   Psychiatric:         Behavior: Behavior normal.         Thought Content:  Thought content normal.         Judgment: Judgment normal.          Total time: 36 minutes  >50% of time spent counseling patient at bedside or POA/family member if applicable , reviewing information and discussing care, coordinating with care team       Signed By: Electronically signed by APRIL Posada CNP on 5/13/2022 at 11:57 AM   Palliative Medicine   [phone]    May 13, 2022

## 2022-05-13 NOTE — PROGRESS NOTES
Pulmonary Progress Note    CC:  Follow up respiratory failure    Subjective:  5 liters of oxygen   Afebrile   Negative 7.5 liters since admission   Does not feel SOB    ROS  No SOB  Has not done much activity       Intake/Output Summary (Last 24 hours) at 5/13/2022 1008  Last data filed at 5/13/2022 0905  Gross per 24 hour   Intake 360 ml   Output 3600 ml   Net -3240 ml         PHYSICAL EXAM:  Blood pressure 119/70, pulse 81, temperature 97.2 °F (36.2 °C), temperature source Oral, resp. rate 18, height 5' 6\" (1.676 m), weight 244 lb (110.7 kg), SpO2 96 %.'  Gen: Chronically ill appearing   Eyes: PERRL. No sclera icterus. No conjunctival injection. ENT: No discharge. Pharynx clear. External appearance of ears and nose normal.  Neck: Trachea midline. No obvious mass. Resp: Faint crackles   CV: Regular rate. Regular rhythm. No murmur or rub. GI: Non-tender. Non-distended. No hernia. Skin: Pale  Lymph: No cervical LAD. No supraclavicular LAD. M/S: No cyanosis. No clubbing. No joint deformity. Neuro: Moves all four extremities. CN 2-12 tested, no defect noted.   Ext:   +++ edema    Medications:    Scheduled Meds:   magnesium sulfate  4,000 mg IntraVENous Once    potassium chloride  40 mEq Oral Once    neomycin-bacitracin-polymyxin   Topical Daily    furosemide  40 mg IntraVENous TID    metoprolol succinate  25 mg Oral Daily    metroNIDAZOLE  500 mg Oral 3 times per day    lidocaine-EPINEPHrine  20 mL IntraDERmal Once    atorvastatin  10 mg Oral Daily    heparin (porcine)  5,000 Units SubCUTAneous 3 times per day    lisinopril  5 mg Oral Daily    miconazole   Topical BID    cefepime  2,000 mg IntraVENous Q12H    fluconazole  200 mg Oral Daily    sodium chloride flush  5-40 mL IntraVENous 2 times per day    multivitamin  1 tablet Oral Daily    thiamine  100 mg Oral Daily       Continuous Infusions:   sodium chloride 25 mL/hr (05/12/22 2109)       PRN Meds:  naloxone, acetaminophen **OR** acetaminophen, ondansetron, nicotine, sodium chloride flush, sodium chloride, naloxone    Labs:  CBC:   Recent Labs     05/11/22  0550 05/12/22  0536 05/13/22  0524   WBC 6.0 5.2 6.5   HGB 11.5* 11.0* 10.8*   HCT 37.6 36.3 35.0*   MCV 74.4* 74.6* 73.5*    203 231     BMP:   Recent Labs     05/11/22  0550 05/12/22  0536 05/13/22  0524    141 143   K 4.0 3.7 3.6   CL 98* 95* 95*   CO2 36* 35* 36*   BUN 11 11 12   CREATININE 0.9 0.8 0.9     LIVER PROFILE: No results for input(s): AST, ALT, LIPASE, BILIDIR, BILITOT, ALKPHOS in the last 72 hours. Invalid input(s): AMYLASE,  ALB  PT/INR: No results for input(s): PROTIME, INR in the last 72 hours. APTT: No results for input(s): APTT in the last 72 hours. UA:No results for input(s): NITRITE, COLORU, PHUR, LABCAST, WBCUA, RBCUA, MUCUS, TRICHOMONAS, YEAST, BACTERIA, CLARITYU, SPECGRAV, LEUKOCYTESUR, UROBILINOGEN, BILIRUBINUR, BLOODU, GLUCOSEU, AMORPHOUS in the last 72 hours. Invalid input(s): Meka Shirts  No results for input(s): PH, PCO2, PO2 in the last 72 hours. Films:  Chest imaging reports were reviewed and imaging was reviewed by me and showed right pleural effusion > left, ground glass, atelectasis    ABG:  Nothing new      I reviewed the labs and images listed above    Assessment/Plan:    Acute Hypoxic Respiratory Failure with saturations less than 90% on room air  Titrate oxygen for saturations greater than or equal to 90%  o Home oxygen assessment prior to DC   Acute on Chronic Hypercapnic Respiratory Failure   Pulmonary edema   o Aggressive diuresis    Right Pleural Effusion (unclear if malignant)   Abnormal CT Chest:  Effusions, ground glass  o Continue with aggressive diuresis    Tobacco Abuse   o Cessation.   Denies chronic breathing issues despite her heavy smoking         DVT prophylaxis  Heparin       Berenice Esparza, DO  [facility]

## 2022-05-13 NOTE — PROGRESS NOTES
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 woman not seen physicians for long time now admitted with lower leg swelling, dizziness, Rt breast fungating mass with odor and drainage she also has Left breast swelling with on going fungal dermatitis, bi lateral lower leg edema with poor skin hygiene and poor dentition. She has morbid obesity BMI at  41, she is on high flow oxygen since admit due to sob. She was seen by Oncology and work up in progress for possible breast cancer. Pt has know breast mass for years but did not seek medical attention due to fear and denial per family at bed side. Ct chest/abd/pelvis results noted with anasarca, hepatomegaly and bi lateral axillary adenopathy with Rt Breast fungating mass. Location :Rt breast mass, pain, drainage odor +        Quality :aching        Severity  8/10:     Duration :months       Timing : intermittent   Context : Fungating breast mass     Modifying factors : none   Associated signs and symptoms: sob , cough , dizziness       Interval History : Ongoing shortness of breath but wean down to 4 lts nasal cannula has lost nearly 6 lbs wt loss ? Or more from fluid loss and Rt leg looking better    Past Medical History:    Past Medical History:   Diagnosis Date    Breast cancer [facility])        Past Surgical History:    History reviewed. No pertinent surgical history. Current Medications:    No outpatient medications have been marked as taking for the 5/8/22 encounter [facility] Encounter). Allergies:  Patient has no known allergies. Immunizations : There is no immunization history on file for this patient.     Social History: Social History     Tobacco Use    Smoking status: Current Every Day Smoker     Packs/day: 1.50     Years: 43.00     Pack years: 64.50    Smokeless tobacco: Never Used   Substance Use Topics    Alcohol use: Yes     Alcohol/week: 1.0 - 3.0 standard drink     Types: 1 - 3 Cans of beer per week     Comment: 05/07/22 last drink    Drug use: Not on file     Social History     Tobacco Use   Smoking Status Current Every Day Smoker    Packs/day: 1.50    Years: 43.00    Pack years: 64.50   Smokeless Tobacco Never Used      Family History : no DVT no copd      REVIEW OF SYSTEMS:      Constitutional:  negative for fevers, chills, night sweats  Eyes:  negative for blurred vision, eye discharge, visual disturbance   HEENT:  negative for hearing loss, ear drainage,nasal congestion  Respiratory:    cough ++ , shortness of breath ++  or hemoptysis   Cardiovascular:  negative for chest pain, palpitations, syncope  Gastrointestinal:  negative for nausea, vomiting, diarrhea, constipation, abdominal pain  Genitourinary:  negative for frequency, dysuria, urinary incontinence, hematuria  Hematologic/Lymphatic:  negative for easy bruising, bleeding and lymphadenopathy  Allergic/Immunologic:  negative for recurrent infections, angioedema, anaphylaxis   Endocrine:  negative for weight changes, polyuria, polydipsia and polyphagia  Musculoskeletal:  Rt breast fungating mass + lower leg edema++   Integumentary: No rashes, skin lesions  Neurological:  negative for headaches, slurred speech, unilateral weakness  Psychiatric: negative for hallucinations,confusion,agitation.        PHYSICAL EXAM:      Vitals:    /70   Pulse 81   Temp 97.2 °F (36.2 °C) (Oral)   Resp 18   Ht 5' 6\" (1.676 m)   Wt 244 lb (110.7 kg)   SpO2 96%   BMI 39.38 kg/m²     General Appearance: alert,in some  acute distress, ++  pallor, no icterus  On nasal cannula + poor dentition ++  Skin: warm and dry, no rash or erythema  Head: normocephalic and atraumatic  Eyes: pupils equal, round, and reactive to light, conjunctivae normal  ENT: tympanic membrane, external ear and ear canal normal bilaterally, nose without deformity, nasal mucosa and turbinates normal without polyps  Neck: supple and non-tender without mass, no thyromegaly  no cervical lymphadenopathy  Pulmonary/Chest: Bi basal crepts++  wheezes, rales or rhonchi, normal air movement, in some  respiratory distress  Cardiovascular: normal rate, regular rhythm, normal S1 and S2, no murmurs, rubs, clicks, or gallops, no carotid bruits  Abdomen: soft, non-tender, non-distended, normal bowel sounds, no masses or organomegaly  Extremities: no cyanosis, clubbing or ++  edema  Musculoskeletal: normal range of motion, no joint swelling, deformity or tenderness  Integumentary: No rashes, no abnormal skin lesions, no petechiae  Neurologic: reflexes normal and symmetric, no cranial nerve deficit  Psych:  Orientation, sensorium, mood normal            Lines:IV  Rt breast fungating mass++ odor+ drainage  Left beast edema and fungal rash ++         Data Review:    CBC:   Lab Results   Component Value Date    WBC 6.5 05/13/2022    HGB 10.8 (L) 05/13/2022    HCT 35.0 (L) 05/13/2022    MCV 73.5 (L) 05/13/2022     05/13/2022     RENAL:   Lab Results   Component Value Date    CREATININE 0.9 05/13/2022    BUN 12 05/13/2022     05/13/2022    K 3.6 05/13/2022    CL 95 (L) 05/13/2022    CO2 36 (H) 05/13/2022     SED RATE: No results found for: SEDRATE  CK: No results found for: CKTOTAL  CRP: No results found for: CRP  Hepatic Function Panel:   Lab Results   Component Value Date    ALKPHOS 93 05/08/2022    ALT 6 05/08/2022    AST 11 05/08/2022    PROT 6.9 05/08/2022    PROT 8.1 01/22/2011    BILITOT 1.1 05/08/2022    LABALBU 3.4 05/08/2022     UA:  Lab Results   Component Value Date    COLORU Yellow 05/08/2022    CLARITYU CLOUDY 05/08/2022    GLUCOSEU Negative 05/08/2022    BILIRUBINUR Negative 05/08/2022    Nori Burrows Negative 05/08/2022    SPECGRAV 1.005 05/08/2022    BLOODU Negative 05/08/2022    PHUR 5.0 05/09/2022    PROTEINU Negative 05/08/2022    UROBILINOGEN 1.0 05/08/2022    NITRU Negative 05/08/2022    LEUKOCYTESUR Negative 05/08/2022    LABMICR YES 05/08/2022    URINETYPE Other 05/08/2022      Urine Microscopic:   Lab Results   Component Value Date    BACTERIA None Seen 05/08/2022    HYALCAST 1 05/08/2022    WBCUA 1 05/08/2022    RBCUA 1 05/08/2022    EPIU 3 05/08/2022     Urine Reflex to Culture:   Lab Results   Component Value Date    URRFLXCULT Not Indicated 05/08/2022         MICRO: cultures reviewed and updated by me   Blood Culture:   Lab Results   Component Value Date    BC No Growth after 4 days of incubation. 05/08/2022    BLOODCULT2 No Growth after 4 days of incubation. 05/08/2022       Respiratory Culture:  Lab Results   Component Value Date    CULTRESP Normal respiratory alejandro 05/09/2022    LABGRAM  05/10/2022     1+ Gram positive cocci  1+ Gram positive rods  1+ WBC's (Polymorphonuclear)       AFB:No results found for: AFBSMEAR  Viral Culture:  No results found for: COVID19  Urine Culture: No results for input(s): Modestollflavio Deea in the last 72 hours. IMAGING:    NM BONE SCAN WHOLE BODY   Final Result   No generalized scintigraphic pattern of osseous metastatic disease. Soft tissue anasarca. VL Extremity Venous Bilateral   Final Result      CT CHEST ABDOMEN PELVIS W CONTRAST   Final Result   1. CT CHEST: Mild congestive heart failure. 2. Nonspecific main pulmonary artery dilatation can be seen with pulmonary   hypertension. 3. Mild atelectasis bilateral lower lungs and small volume right pleural   effusion. Pneumonia is a consideration. 4. Large fungating right breast mass with diffuse right breast skin   thickening almost certainly primary breast cancer. 5. Bilateral axillary lymph node enlargement may be reactive or reflect   metastatic disease. 6. Anasarca.    7. CT ABDOMEN/PELVIS: No acute abnormality. 8. Nonspecific left adrenal 1.5 cm nodule. Follow-up noncontrast CT abdomen   no sooner than 48 hours from contrast administration recommended. 9. Nonspecific mild adenopathy right upper quadrant common with hepatic   steatosis, probably reactive. 10. Nonspecific splenomegaly. 11. Hepatic steatosis and hepatomegaly. 12. Anasarca. XR CHEST PORTABLE   Final Result   Pulmonary vascular congestion/interstitial edema. Cardiomegaly.                All the pertinent images and reports for the current Hospitalization were reviewed by me     Scheduled Meds:   magnesium sulfate  4,000 mg IntraVENous Once    neomycin-bacitracin-polymyxin   Topical Daily    furosemide  40 mg IntraVENous TID    metoprolol succinate  25 mg Oral Daily    metroNIDAZOLE  500 mg Oral 3 times per day    lidocaine-EPINEPHrine  20 mL IntraDERmal Once    atorvastatin  10 mg Oral Daily    heparin (porcine)  5,000 Units SubCUTAneous 3 times per day    lisinopril  5 mg Oral Daily    miconazole   Topical BID    cefepime  2,000 mg IntraVENous Q12H    fluconazole  200 mg Oral Daily    sodium chloride flush  5-40 mL IntraVENous 2 times per day    multivitamin  1 tablet Oral Daily    thiamine  100 mg Oral Daily       Continuous Infusions:   sodium chloride 25 mL/hr (05/12/22 2109)       PRN Meds:  naloxone, acetaminophen **OR** acetaminophen, ondansetron, nicotine, sodium chloride flush, sodium chloride, naloxone      Assessment:     Patient Active Problem List   Diagnosis    [facility] ([facility]    Leg swelling    Cellulitis    New onset of congestive heart failure (HCC)    Acute on chronic diastolic heart failure (HCC)    Type 2 diabetes mellitus without complication, without long-term current use of insulin (HCC)    Aortic atherosclerosis (HCC)    Nicotine dependence    Iron deficiency anemia    Acute on chronic systolic heart failure (HCC)     Acute Hypoxic resp failure  CHF  Anasarca  Suspect COPD with significant Smoking history   Obesity BMI at  39  Rt breast Fungating mass +  Left breast fungal dermatitis  DM new diagnosis  Smoking ++  Alcohol abuse  Lower leg edema  CT chest with fungating mass and axillary adenopathy         Seen by Multiple services due to multiple medical issues main concern is the locally advanced fungating mass Rt breast concern for Malignancy and biopsy pending and she is volume over loaded and hypoxic     Will need IV abx for the secondary infection due to fungating mass willl be able to choose oral abx once clinically better     She is now completed  Nuclear scan test and its negative   Biopsy confirmed Phyllloides tumor and seen by [address] [place] [address] Rd, Microbiology, Radiology and all the pertinent results from current hospitalization and  care every where were reviewed  by me as a part of the evaluation   Plan:   1. Change to oral Augmentin x 875 mg twice a day for x 7 days t   2. Cont  Oral Fluconazole x 200 mg x 10 days    3. Wound cx mixed alejandro  4. fOLLOW UP WITH surgery as planned   5. Resp cx  -ve  6. Breast biopsy path Phylloides tumor  7. HIV  -ve  and hepatitis screen -ve  8. Quit smoking   9. Watch for DTS   10 Rt leg Kerlix and ace wraps    11. Will sign off      Discussed with patient/Family and Nursing     Thanks for allowing me to participate in your patient's care and please call me with any questions or concerns.     Katlyn Campo MD  Infectious Disease  [facility] ([facility]) Physician  Phone: [phone]   Fax : [phone]

## 2022-05-13 NOTE — PROGRESS NOTES
Hematology Oncology Daily Progress Note    Admit Date: 5/8/2022  Hospital day several    Subjective:     Patient has complaints of mild fatigue--denies sob/cp. Medication side effects: none    Scheduled Meds:   neomycin-bacitracin-polymyxin   Topical Daily    furosemide  40 mg IntraVENous TID    metoprolol succinate  25 mg Oral Daily    metroNIDAZOLE  500 mg Oral 3 times per day    lidocaine-EPINEPHrine  20 mL IntraDERmal Once    atorvastatin  10 mg Oral Daily    heparin (porcine)  5,000 Units SubCUTAneous 3 times per day    lisinopril  5 mg Oral Daily    miconazole   Topical BID    cefepime  2,000 mg IntraVENous Q12H    fluconazole  200 mg Oral Daily    sodium chloride flush  5-40 mL IntraVENous 2 times per day    multivitamin  1 tablet Oral Daily    thiamine  100 mg Oral Daily     Continuous Infusions:   sodium chloride 25 mL/hr (05/12/22 2109)     PRN Meds:naloxone, acetaminophen **OR** acetaminophen, ondansetron, nicotine, sodium chloride flush, sodium chloride, naloxone    Review of Systems  Pertinent items are noted in HPI. REVIEW OF SYSTEMS:         · Constitutional: Denies fever, sweats, weight loss     · Eyes: No visual changes or diplopia. No scleral icterus. · ENT: No Headaches, hearing loss or vertigo. No mouth sores or sore throat. · Cardiovascular: No chest pain, dyspnea on exertion, palpitations or loss of consciousness. · Respiratory: No cough or wheezing, no sputum production. No hemoptysis. .    · Gastrointestinal: No abdominal pain, appetite loss, blood in stools. No change in bowel habits. · Genitourinary: No dysuria, trouble voiding, or hematuria. · Musculoskeletal:  Generalized weakness. No joint complaints. · Integumentary: No rash or pruritis. · Neurological: No headache, diplopia. No change in gait, balance, or coordination. No paresthesias. · Endocrine: No temperature intolerance. No excessive thirst, fluid intake, or urination.    · Hematologic/Lymphatic: No abnormal bruising or ecchymoses, blood clots or swollen lymph nodes. · Allergic/Immunologic: No nasal congestion or hives. ·     Objective:     Patient Vitals for the past 8 hrs:   BP Temp Temp src Pulse Resp SpO2 Weight   05/13/22 0545 -- -- -- -- -- -- 244 lb (110.7 kg)   05/13/22 0341 119/70 97.2 °F (36.2 °C) Oral 81 23 92 % 244 lb 11.4 oz (111 kg)     I/O last 3 completed shifts: In: 720 [P.O.:720]  Out: 4700 [Urine:4700]  No intake/output data recorded. /70   Pulse 81   Temp 97.2 °F (36.2 °C) (Oral)   Resp 23   Ht 5' 6\" (1.676 m)   Wt 244 lb (110.7 kg)   SpO2 92%   BMI 39.38 kg/m²     General Appearance:    Alert, cooperative, no distress, appears stated age   Head:    Normocephalic, without obvious abnormality, atraumatic   Eyes:    PERRL, conjunctiva/corneas clear, EOM's intact, fundi     benign, both eyes        Ears:    Normal TM's and external ear canals, both ears   Nose:   Nares normal, septum midline, mucosa normal, no drainage    or sinus tenderness   Throat:   Lips, mucosa, and tongue normal; teeth and gums normal   Neck:   Supple, symmetrical, trachea midline, no adenopathy;        thyroid:  No enlargement/tenderness/nodules; no carotid    bruit or JVD   Back:     Symmetric, no curvature, ROM normal, no CVA tenderness   Lungs:     Clear to auscultation bilaterally, respirations unlabored   Chest wall:    No tenderness or deformity   Heart:    Regular rate and rhythm, S1 and S2 normal, no murmur, rub   or gallop   Abdomen:     Soft, non-tender, bowel sounds active all four quadrants,     no masses, no organomegaly           Extremities:   Extremities normal, atraumatic, no cyanosis or edema   Pulses:   2+ and symmetric all extremities   Skin:   Skin color, texture, turgor normal, no rashes or lesions   Lymph nodes:   Cervical, supraclavicular, and axillary nodes normal   Neurologic:   CNII-XII intact.  Normal strength, sensation and reflexes       throughout         Data Review  CBC:   Lab Results   Component Value Date    WBC 6.5 05/13/2022    RBC 4.76 05/13/2022       Assessment:     Principal Problem:    Acute on chronic diastolic heart failure (HCC)  Active Problems:    Malignancy (HCC)    Leg swelling    Cellulitis    New onset of congestive heart failure (HCC)    Type 2 diabetes mellitus without complication, without long-term current use of insulin (HCC)    Aortic atherosclerosis (HCC)    Nicotine dependence    Iron deficiency anemia    Acute on chronic systolic heart failure (HCC)  Resolved Problems:    Suspected CHF (congestive heart failure)      Plan:     1. Phyllodes tumor of the breast-locally advanced. I spoke with Dr. Olesya Preston yesterday and she feels that this could be resected. I would likely get an outpatient PET/CT to evaluate the bilateral axillary lymphadenopathy. She would likely need adjuvant radiation therapy and perhaps adjuvant chemo as well. She will be presented at tumor board. 2.  Diastolic heart failure. I will defer to cardiology. It is okay to discharge at any time from my standpoint.         Electronically signed by Garland Hernández MD on 5/13/2022 at 8:04 AM

## 2022-05-13 NOTE — PROGRESS NOTES
Hospitalist Progress Note  5/13/2022 10:26 AM    PCP: No primary care provider on file. [phone]     Date of Admission: 5/8/2022                                                                                                                     HOSPITAL COURSE    Patient demographics:  The patient  Glendy Ibrahim is a 55 y.o. female     Significant past medical history:   Patient Active Problem List   Diagnosis    [facility]    Leg swelling    Cellulitis    New onset of congestive heart failure ([facility]    Acute on chronic diastolic heart failure ([facility]    Type 2 diabetes mellitus without complication, without long-term current use of insulin (HCC)    Aortic atherosclerosis ([facility]    Nicotine dependence    Iron deficiency anemia    Acute on chronic systolic heart failure (HCC)         Presenting symptoms:  Leg swelling, shortness of breath, breast swelling and lesion    Diagnostic workup:      CONSULTS DURING ADMISSION :   IP CONSULT TO HEART FAILURE NURSE/COORDINATOR  IP CONSULT TO DIETITIAN  IP CONSULT TO INFECTIOUS DISEASES  IP CONSULT TO GENERAL SURGERY  IP CONSULT TO OB GYN  IP CONSULT TO ONCOLOGY  IP CONSULT TO CARDIOLOGY  IP CONSULT TO SOCIAL WORK  IP CONSULT TO PULMONOLOGY  IP CONSULT TO PALLIATIVE CARE  [facility]      Patient was diagnosed with:        Treatment while inpatient:                                                                                         ----------------------------------------------------------      SUBJECTIVE COMPLAINTS- follow up for CHF    Diet: ADULT DIET; Regular; 4 carb choices (60 gm/meal); Low Sodium (2 gm); 1500 ml  ADULT ORAL NUTRITION SUPPLEMENT; Lunch;  Low Calorie/High Protein Oral Supplement      OBJECTIVE:   Patient Active Problem List   Diagnosis    [facility]ignancy ([facility]    Leg swelling    Cellulitis    New onset of congestive heart failure (HCC)    Acute on chronic diastolic heart failure (HCC)    Type 2 diabetes mellitus without complication, without long-term current use of insulin (HCC)    Aortic atherosclerosis (HCC)    Nicotine dependence    Iron deficiency anemia    Acute on chronic systolic heart failure (HCC)       Allergies  Patient has no known allergies. Medications    Scheduled Meds:   magnesium sulfate  4,000 mg IntraVENous Once    neomycin-bacitracin-polymyxin   Topical Daily    furosemide  40 mg IntraVENous TID    metoprolol succinate  25 mg Oral Daily    metroNIDAZOLE  500 mg Oral 3 times per day    lidocaine-EPINEPHrine  20 mL IntraDERmal Once    atorvastatin  10 mg Oral Daily    heparin (porcine)  5,000 Units SubCUTAneous 3 times per day    lisinopril  5 mg Oral Daily    miconazole   Topical BID    cefepime  2,000 mg IntraVENous Q12H    fluconazole  200 mg Oral Daily    sodium chloride flush  5-40 mL IntraVENous 2 times per day    multivitamin  1 tablet Oral Daily    thiamine  100 mg Oral Daily     Continuous Infusions:   sodium chloride 25 mL/hr (05/12/22 2109)     PRN Meds:  naloxone, acetaminophen **OR** acetaminophen, ondansetron, nicotine, sodium chloride flush, sodium chloride, naloxone    Vitals   Vitals /wt   Patient Vitals for the past 8 hrs:   BP Temp Temp src Pulse Resp SpO2 Weight   05/13/22 0900 -- -- -- -- 18 96 % --   05/13/22 0545 -- -- -- -- -- -- 244 lb (110.7 kg)   05/13/22 0341 119/70 97.2 °F (36.2 °C) Oral 81 23 92 % 244 lb 11.4 oz (111 kg)        72HR INTAKE/OUTPUT:      Intake/Output Summary (Last 24 hours) at 5/13/2022 1026  Last data filed at 5/13/2022 0905  Gross per 24 hour   Intake 360 ml   Output 3600 ml   Net -3240 ml       Exam:    Gen:   Alert and oriented ×3  Eyes: PERRL. No sclera icterus. No conjunctival injection. ENT: No discharge. Pharynx clear. External appearance of ears and nose normal.  Neck: Trachea midline. No obvious mass. Resp: No accessory muscle use. No crackles. No wheezes. No rhonchi. CV: Regular rate. Regular rhythm.  No murmur or rub. No edema. GI: Non-tender. Non-distended. No hernia. Skin: Warm, dry, normal texture and turgor. Lymph: No cervical LAD. No supraclavicular LAD. M/S: / Ext. No cyanosis. No clubbing. No joint deformity. Neuro: CN 2-12 are intact,  no neurologic deficits noted. PT/INR:   No results for input(s): PROTIME, INR in the last 72 hours. APTT:   No results for input(s): APTT in the last 72 hours. CBC:   Recent Labs     05/11/22  0550 05/12/22  0536 05/13/22 0524   WBC 6.0 5.2 6.5   HGB 11.5* 11.0* 10.8*   HCT 37.6 36.3 35.0*   MCV 74.4* 74.6* 73.5*    203 231       BMP:   Recent Labs     05/11/22  0550 05/12/22  0536 05/13/22 0524    141 143   K 4.0 3.7 3.6   CL 98* 95* 95*   CO2 36* 35* 36*   BUN 11 11 12   CREATININE 0.9 0.8 0.9       LIVER PROFILE:   No results for input(s): ALKPHOS, AST, ALT, ALB, BILIDIR, BILITOT, ALKPHOS in the last 72 hours. No results for input(s): AMYLASE in the last 72 hours. No results for input(s): LIPASE in the last 72 hours. UA:  No results for input(s): NITRITE, LABCAST, WBCUA, RBCUA, MUCUS in the last 72 hours. TROPONIN:   No results for input(s): Jefry Hug in the last 72 hours. Lab Results   Component Value Date/Time    URRFLXCULT Not Indicated 05/08/2022 04:45 PM       No results for input(s): TSHREFLEX in the last 72 hours. No components found for: [id number]  POC GLUCOSE:    No results for input(s): POCGLU in the last 72 hours. No results for input(s): LABA1C in the last 72 hours. Lab Results   Component Value Date    LABA1C 7.0 05/09/2022         ASSESSMENT AND PLAN  Acute on chronic systolic CHF  Echocardiogram shows normal ejection fraction.   Effusion and anasarca  Continue on diuretics  Weight improved to 256-244  Significantly fluid overloaded    Acute respiratory failure with hypoxia and hypercapnia  Likely due to CHF  Oxygen needs are decreasing with diuresis      Leg swelling  Likely related to CHF      Breast cellulitis  Continue with antibiotics  Cefepime metronidazole and fluconazole  Infectious diseases following    Breast cancer  Right breast fungating mass  Patient needs surgery and radiation treatment  PET scan as an outpatient  Hematology oncology is following        Pneumonia  Continue antibiotics  Patient started on vancomycin/cefepime/Flagyl and fluconazole, started on miconazole powder for breast cellulitis        Smoking abuse    Altered mental status      Tobacco abuse Z72.0  Nicotine patch and counseling      Consult to infectious disease  Consult to cardiology  Consult oncology  Consult to general surgery  Consult to OB/GYN  Pulmonary consult        Code Status: Full Code        Dispo -patient agreed to bone scan and wants to proceed with treatment        The patient and / or the family were informed of the results of any tests, a time was given to answer questions, a plan was proposed and they agreed with plan. Rudi Palma MD    This note was transcribed using [id number] [employer]. Please disregard any translational errors.

## 2022-05-13 NOTE — PLAN OF CARE
Problem: Discharge Planning  Goal: Discharge to home or other facility with appropriate resources  5/13/2022 0016 by Marko Graf RN  Outcome: Progressing     Problem: Safety - Adult  Goal: Free from fall injury  5/13/2022 0016 by Marko Graf RN  Outcome: Progressing   Patient assessed for fall risk; fall precautions initiated. Patient and family instructed about safety devices. Environment kept free of clutter and adequate lighting provided. Bed locked and in lowest position. Call light within reach. Will continue to monitor. Problem: ABCDS Injury Assessment  Goal: Absence of physical injury  5/13/2022 0016 by Marko Graf RN  Outcome: Progressing   Skin assessment completed every shift. Pt assessed for incontinence, appropriate barrier cream applied prn. Pt encouraged to turn/rotate every 2 hours. Assistance provided if pt unable to do so themselves. Problem: Skin/Tissue Integrity  Goal: Absence of new skin breakdown  Description: 1. Monitor for areas of redness and/or skin breakdown  2. Assess vascular access sites hourly  3. Every 4-6 hours minimum:  Change oxygen saturation probe site  4. Every 4-6 hours:  If on nasal continuous positive airway pressure, respiratory therapy assess nares and determine need for appliance change or resting period.   5/13/2022 0016 by Marko Graf RN  Outcome: Progressing     Problem: Neurosensory - Adult  Goal: Achieves stable or improved neurological status  5/13/2022 0016 by Marko Graf RN  Outcome: Progressing     Problem: Neurosensory - Adult  Goal: Absence of seizures  5/13/2022 0016 by Marko Graf RN  Outcome: Progressing     Problem: Neurosensory - Adult  Goal: Remains free of injury related to seizures activity  5/13/2022 0016 by Marko Graf RN  Outcome: Progressing     Problem: Musculoskeletal - Adult  Goal: Return mobility to safest level of function  5/13/2022 0016 by Marko Graf RN  Outcome: Progressing     Problem:

## 2022-05-13 NOTE — PROGRESS NOTES
Referring Physician: Dr. Gustavo Lambert  Reason for Consultation: \"Suspected CHF\"  Chief Complaint: Short of breath and swelling    Subjective:   History of Present Illness:  John Hernandez is a 55 y.o. patient who presented to the hospital with complaints of worsening lower extremity edema and shortness of breath. The patient did not routinely follow with a physician. She notes lower extremity edema for the past several years but did not seek medical attention. The swelling would wax and wane in intensity but would become so severe at times that she could not bend her knees or move her ankles. She would have skin breakdown at times on her lower extremities. However over the last few weeks, she has been having worsening shortness of breath. She states that she will sleep upright in bed or sitting at the edge of her bed secondary to PND/orthopnea. She says the real reason she came to the hospital was that she fell out of bed and her leg was trapped in an awkward position that she thought she may have injured her leg. [facility] was also consulted as she has a large fungating right breast mass. She first noticed 2 \"marble sized\" masses in her right breast approximately 10 years ago. For over 1 year, she says the mass on her chest will open up periodically and bleed but she has never sought medical attention. She was started on IV Lasix and notes improvement in the edema. She has generalized anasarca. Pulmonary was also consulted as the patient had hypercapnic respiratory failure requiring BiPAP there are no family members present bedside but per report the patient had been confused for several days prior to hospitalization. She endorses smoking. Interval history:  No acute overnight cardiac events. Patient's  and son are present bedside. She is still quite edematous but says the swelling is improving. She also feels the shortness of breath is improving. She denies associated chest pains. unanticipated weight loss. There's been no change in energy level, sleep pattern, or activity level. No fevers, chills. · Eyes: No visual changes or diplopia. No scleral icterus. · ENT: No Headaches, hearing loss or vertigo. No mouth sores or sore throat. · Cardiovascular: as reviewed in HPI  · Respiratory: No cough or wheezing, no sputum production. No hemoptysis. · Gastrointestinal: No abdominal pain, appetite loss, blood in stools. No change in bowel or bladder habits. · Genitourinary: No dysuria, trouble voiding, or hematuria. · Musculoskeletal:  No gait disturbance, no joint complaints. · Integumentary: Right breast mass with redness. · Neurological: No headache, diplopia, change in muscle strength, numbness or tingling. · Psychiatric: No anxiety or depression. · Endocrine: No temperature intolerance. No excessive thirst, fluid intake, or urination. No tremor. · Hematologic/Lymphatic: No abnormal bruising or bleeding, blood clots or swollen lymph nodes. · Allergic/Immunologic: No nasal congestion or hives. Objective:   PHYSICAL EXAM:    Vitals:    05/13/22 0900   BP: 119/70   Pulse: 81   Resp: 18   Temp: 97.2   SpO2: 96%    Weight: 244 lb (110.7 kg)       General Appearance:  Alert, cooperative, no respiratory distress, appears stated age. Wearing O2. Head:  Normocephalic, without obvious abnormality, atraumatic. Eyes:  Pupils equal and round. No scleral icterus. Mouth: Moist mucosa, no pharyngeal erythema. Nose: Nares normal. No drainage or sinus tenderness. Neck: Supple, symmetrical, trachea midline. No adenopathy. No tenderness/mass/nodules. No carotid bruit or elevated JVD. Lungs:   Respirations unlabored. Bibasilar crackles. Chest Wall:  No tenderness or deformity. Heart:  Regular rate. S1/S2 normal. No murmur, rub, or gallop. Abdomen:   Soft, non-tender, bowel sounds active. Musculoskeletal: No muscle wasting or digital clubbing.    Extremities: Extremities normal, atraumatic. No cyanosis. 2+ BLE edema. Pulses: 2+ radial and carotid pulses, symmetric. Skin: Fungating right breast mass. Pysch: Normal mood and affect. Alert and oriented x 4. Neurologic: Normal gross motor. Follows commands. Labs     CBC:   Lab Results   Component Value Date    WBC 6.5 05/13/2022    RBC 4.76 05/13/2022    HGB 10.8 05/13/2022    HCT 35.0 05/13/2022    MCV 73.5 05/13/2022    RDW 19.1 05/13/2022     05/13/2022     CMP:  Lab Results   Component Value Date     05/13/2022    K 3.6 05/13/2022    K 3.9 05/08/2022    CL 95 05/13/2022    CO2 36 05/13/2022    BUN 12 05/13/2022    CREATININE 0.9 05/13/2022    GFRAA >60 05/13/2022    GFRAA >60 01/22/2011    AGRATIO 1.0 05/08/2022    LABGLOM >60 05/13/2022    GLUCOSE 89 05/13/2022    PROT 6.9 05/08/2022    PROT 8.1 01/22/2011    CALCIUM 9.0 05/13/2022    BILITOT 1.1 05/08/2022    ALKPHOS 93 05/08/2022    AST 11 05/08/2022    ALT 6 05/08/2022     PT/INR:  No results found for: PTINR  HgBA1c:  Lab Results   Component Value Date    LABA1C 7.0 05/09/2022     Lab Results   Component Value Date    TROPONINI 0.01 05/08/2022       Cardiac Data     EKG: Personally interpreted. 5/8/2022. Sinus tachycardia. Low voltage QRS complex. Nonspecific T wave abnormality. Echo: 5/11/22. Left ventricular cavity size is normal.  Normal left ventricular wall thickness. Ejection fraction is visually estimated to be 55-60%. No regional wall motion abnormalities are noted. Indeterminate diastolic function. Measurement attempted while definity was administered. right ventricular appears mildly enlarged with possible mildly reduced RV function  Unable to obtain doppler, M-Mode and RV TDI  Right ventricular septum flattened in systole and diastole consistent with RV pressure and volume overload. Mild pulmonic regurgitation present. Telemetry: Personally interpreted. Sinus.     Assessment and Plan   1) Acute on chronic diastolic heart failure. EF 55-60%. NYHA III. Patient appears hypervolemic. Continue IV Lasix. Continue ACE-I and Toprol-XL. Weight 263->244. 2) Acute on chronic hypoxic and hypercapnic respiratory failure. Pulmonary consulted. Diurese as tolerated. Wean O2 as tolerated. 3) Breast cancer. [facility] consulted. Patient now says she is agreeable to treating the cancer and having surgery. Would recommend continuing to defer surgery in the short-term until more compensated from her CHF. 4) CAD risk equivalent with type 2 diabetes mellitus/aortic atherosclerosis. Diabetes management per primary team.  Continue statin therapy. 5) Nicotine Addiction. Encouraged smoking cessation but patient is in the contemplative stage. 6) Obesity. BMI 39. Encourage weight loss. 7) Iron deficiency anemia. Ferritin only 17. Received IV iron but defer additional work-up/treatment to primary team.    Overall, the problems requiring hospitalization are high in severity. Thank you for allowing us to participate in the care of Cristal Traylor. Antonieta Lane.  Sarah Lewis, [address] [place] [address] Road  5/13/2022 9:17 AM

## 2022-05-14 LAB
ANION GAP SERPL CALCULATED.3IONS-SCNC: 11 MMOL/L (ref 3–16)
BASOPHILS ABSOLUTE: 0 K/UL (ref 0–0.2)
BASOPHILS RELATIVE PERCENT: 0.9 %
BUN BLDV-MCNC: 12 MG/DL (ref 7–20)
CALCIUM SERPL-MCNC: 9 MG/DL (ref 8.3–10.6)
CHLORIDE BLD-SCNC: 93 MMOL/L (ref 99–110)
CO2: 37 MMOL/L (ref 21–32)
CREAT SERPL-MCNC: 0.8 MG/DL (ref 0.6–1.1)
EOSINOPHILS ABSOLUTE: 0.2 K/UL (ref 0–0.6)
EOSINOPHILS RELATIVE PERCENT: 3.4 %
GFR AFRICAN AMERICAN: >60
GFR NON-AFRICAN AMERICAN: >60
GLUCOSE BLD-MCNC: 121 MG/DL (ref 70–99)
GLUCOSE BLD-MCNC: 96 MG/DL (ref 70–99)
HCT VFR BLD CALC: 35.4 % (ref 36–48)
HEMOGLOBIN: 10.9 G/DL (ref 12–16)
LYMPHOCYTES ABSOLUTE: 1 K/UL (ref 1–5.1)
LYMPHOCYTES RELATIVE PERCENT: 19.2 %
MAGNESIUM: 1.9 MG/DL (ref 1.8–2.4)
MCH RBC QN AUTO: 22.6 PG (ref 26–34)
MCHC RBC AUTO-ENTMCNC: 30.6 G/DL (ref 31–36)
MCV RBC AUTO: 73.7 FL (ref 80–100)
MONOCYTES ABSOLUTE: 0.7 K/UL (ref 0–1.3)
MONOCYTES RELATIVE PERCENT: 13 %
NEUTROPHILS ABSOLUTE: 3.3 K/UL (ref 1.7–7.7)
NEUTROPHILS RELATIVE PERCENT: 63.5 %
PDW BLD-RTO: 18.8 % (ref 12.4–15.4)
PERFORMED ON: ABNORMAL
PLATELET # BLD: 181 K/UL (ref 135–450)
PMV BLD AUTO: 7.6 FL (ref 5–10.5)
POTASSIUM SERPL-SCNC: 3.5 MMOL/L (ref 3.5–5.1)
RBC # BLD: 4.81 M/UL (ref 4–5.2)
SODIUM BLD-SCNC: 141 MMOL/L (ref 136–145)
WBC # BLD: 5.2 K/UL (ref 4–11)

## 2022-05-14 PROCEDURE — 6370000000 HC RX 637 (ALT 250 FOR IP): Performed by: STUDENT IN AN ORGANIZED HEALTH CARE EDUCATION/TRAINING PROGRAM

## 2022-05-14 PROCEDURE — 2580000003 HC RX 258: Performed by: STUDENT IN AN ORGANIZED HEALTH CARE EDUCATION/TRAINING PROGRAM

## 2022-05-14 PROCEDURE — 83735 ASSAY OF MAGNESIUM: CPT

## 2022-05-14 PROCEDURE — 6360000002 HC RX W HCPCS: Performed by: NURSE PRACTITIONER

## 2022-05-14 PROCEDURE — 6360000002 HC RX W HCPCS: Performed by: STUDENT IN AN ORGANIZED HEALTH CARE EDUCATION/TRAINING PROGRAM

## 2022-05-14 PROCEDURE — 6370000000 HC RX 637 (ALT 250 FOR IP): Performed by: INTERNAL MEDICINE

## 2022-05-14 PROCEDURE — 36415 COLL VENOUS BLD VENIPUNCTURE: CPT

## 2022-05-14 PROCEDURE — 6360000002 HC RX W HCPCS: Performed by: INTERNAL MEDICINE

## 2022-05-14 PROCEDURE — 2700000000 HC OXYGEN THERAPY PER DAY

## 2022-05-14 PROCEDURE — 85025 COMPLETE CBC W/AUTO DIFF WBC: CPT

## 2022-05-14 PROCEDURE — 80048 BASIC METABOLIC PNL TOTAL CA: CPT

## 2022-05-14 PROCEDURE — 99232 SBSQ HOSP IP/OBS MODERATE 35: CPT | Performed by: INTERNAL MEDICINE

## 2022-05-14 PROCEDURE — 94761 N-INVAS EAR/PLS OXIMETRY MLT: CPT

## 2022-05-14 PROCEDURE — 2060000000 HC ICU INTERMEDIATE R&B

## 2022-05-14 RX ORDER — FUROSEMIDE 10 MG/ML
100 INJECTION INTRAMUSCULAR; INTRAVENOUS 2 TIMES DAILY
Status: COMPLETED | OUTPATIENT
Start: 2022-05-14 | End: 2022-05-16

## 2022-05-14 RX ADMIN — HEPARIN SODIUM 5000 UNITS: 5000 INJECTION INTRAVENOUS; SUBCUTANEOUS at 21:59

## 2022-05-14 RX ADMIN — AMOXICILLIN AND CLAVULANATE POTASSIUM 1 TABLET: 875; 125 TABLET, FILM COATED ORAL at 10:09

## 2022-05-14 RX ADMIN — FUROSEMIDE 100 MG: 10 INJECTION, SOLUTION INTRAMUSCULAR; INTRAVENOUS at 17:51

## 2022-05-14 RX ADMIN — HEPARIN SODIUM 5000 UNITS: 5000 INJECTION INTRAVENOUS; SUBCUTANEOUS at 16:16

## 2022-05-14 RX ADMIN — MICONAZOLE NITRATE: 2 POWDER TOPICAL at 20:04

## 2022-05-14 RX ADMIN — LISINOPRIL 5 MG: 5 TABLET ORAL at 10:10

## 2022-05-14 RX ADMIN — AMOXICILLIN AND CLAVULANATE POTASSIUM 1 TABLET: 875; 125 TABLET, FILM COATED ORAL at 20:05

## 2022-05-14 RX ADMIN — THIAMINE HCL TAB 100 MG 100 MG: 100 TAB at 10:10

## 2022-05-14 RX ADMIN — ATORVASTATIN CALCIUM 10 MG: 10 TABLET, FILM COATED ORAL at 10:10

## 2022-05-14 RX ADMIN — Medication 10 ML: at 20:05

## 2022-05-14 RX ADMIN — HEPARIN SODIUM 5000 UNITS: 5000 INJECTION INTRAVENOUS; SUBCUTANEOUS at 05:15

## 2022-05-14 RX ADMIN — FUROSEMIDE 40 MG: 10 INJECTION, SOLUTION INTRAMUSCULAR; INTRAVENOUS at 10:10

## 2022-05-14 RX ADMIN — THERA TABS 1 TABLET: TAB at 10:10

## 2022-05-14 RX ADMIN — METOPROLOL SUCCINATE 25 MG: 25 TABLET, EXTENDED RELEASE ORAL at 10:10

## 2022-05-14 RX ADMIN — POLYMYXIN B SULFATE, BACITRACIN ZINC, NEOMYCIN SULFATE: 5000; 3.5; 4 OINTMENT TOPICAL at 17:53

## 2022-05-14 RX ADMIN — FLUCONAZOLE 200 MG: 100 TABLET ORAL at 20:05

## 2022-05-14 NOTE — PROGRESS NOTES
Cardiology Progress Note     Admit Date: 2022     Reason for follow up: new diagnosis of acute diastolic CHF    HPI and Interval History:   52yo F h/o chronic dCHF presents with LE edema x 1 year and dyspnea and diagnosed with new diagnosis acute diastolic CHF. Interval History: Patient seen and examined. Clinical notes reviewed. Telemetry reviewed - SR 80's bpm. No new complaint today. No major events overnight. Denies having angina, shortness of breath, dyspnea on exertion, Orthopnea, PND at the time of this visit. Review of System:  All other systems reviewed except for that noted above. Pertinent negatives and positives are:     · General: negative for fever, chills   · Ophthalmic ROS: negative for - eye pain or loss of vision  · ENT ROS: negative for - headaches, sore throat   · Respiratory: negative for - cough, sputum  · Cardiovascular: Reviewed in HPI  · Gastrointestinal: negative for - abdominal pain, diarrhea, N/V  · Hematology: negative for - bleeding, blood clots, bruising or jaundice  · Genito-Urinary:  negative for - Dysuria or incontinence  · Musculoskeletal: negative for - Joint swelling, muscle pain  · Neurological: negative for - confusion, dizziness, headaches   · Psychiatric: No anxiety, no depression.   · Dermatological: negative for - rash      Physical Examination:  Vitals:    22 0518   BP: 121/75   Pulse: 72   Resp: 24   Temp: 98.3 °F (36.8 °C)   SpO2:         Intake/Output Summary (Last 24 hours) at 2022 1007  Last data filed at 2022 0522  Gross per 24 hour   Intake 500 ml   Output 1500 ml   Net -1000 ml     In: 620 [P.O.:620]  Out: 1500    Wt Readings from Last 3 Encounters:   22 240 lb 4.8 oz (109 kg)     Temp  Av.9 °F (36.6 °C)  Min: 97.4 °F (36.3 °C)  Max: 98.6 °F (37 °C)  Pulse  Av.3  Min: 72  Max: 79  BP  Min: 115/68  Max: 124/82  SpO2  Av.2 %  Min: 91 %  Max: 94 %    · Telemetry: Sinus rhythm with v-rates 80's bpm  · Constitutional: Alert. Oriented to person, place, and time. No distress. · Head: Normocephalic and atraumatic. · Mouth/Throat: Lips appear moist. Oropharynx is clear and moist.  · Eyes: Conjunctivae normal. EOM are normal.   · Neck: Neck supple. No lymphadenopathy. No rigidity. 17cm JVD present. · Cardiovascular: Normal rate, regular rhythm. Normal S1&S2. Carotid pulse 2+ bilaterally. · Pulmonary/Chest: Bilateral respiratory sounds present. No respiratory accessory muscle use. No wheezes, No rhonchi. + bibasilar rales. · Abdominal: Soft. Normal bowel sounds present. No distension, No tenderness. No splenomegaly. No hernia. · Musculoskeletal: No tenderness. Full range of motion in bilateral upper and lower extremities. 3+ pitting BLE edema    · Lymphadenopathy: Has no cervical adenopathy. · Neurological: Alert and oriented. Cranial nerve II-XII grossly intact, No gross deficit to touch. · Skin: Skin is warm and dry. No rash, lesions, ulcerations noted. · Psychiatric: No anxiety nor agitation. Labs, telemetry, diagnostic and imaging results personally reviewed and interpreted. Recent Labs     05/12/22  0536 05/13/22  0524 05/14/22  0609    143 141   K 3.7 3.6 3.5   CL 95* 95* 93*   CO2 35* 36* 37*   BUN 11 12 12   CREATININE 0.8 0.9 0.8     Recent Labs     05/12/22  0536 05/13/22  0524 05/14/22  0609   WBC 5.2 6.5 5.2   HGB 11.0* 10.8* 10.9*   HCT 36.3 35.0* 35.4*   MCV 74.6* 73.5* 73.7*    231 181     Lab Results   Component Value Date    TROPONINI 0.01 05/08/2022     Estimated Creatinine Clearance: 99 mL/min (based on SCr of 0.8 mg/dL).    No results found for: BNP  Lab Results   Component Value Date    PROTIME 16.2 05/08/2022    INR 1.41 05/08/2022     Lab Results   Component Value Date    CHOL 111 05/09/2022    HDL 26 05/09/2022    TRIG 90 05/09/2022       Scheduled Meds:   amoxicillin-clavulanate  1 tablet Oral 2 times per day    neomycin-bacitracin-polymyxin   Topical Daily    furosemide Type 2 diabetes mellitus without complication, without long-term current use of insulin (HCC) [E11.9]      Priority: Medium    Aortic atherosclerosis (HCC) [I70.0]      Priority: Medium    Nicotine dependence [F17.200]      Priority: Medium    Malignancy ([address] [C80.1] 05/08/2022     Priority: Medium    Leg swelling [M79.89] 05/08/2022     Priority: Medium    Cellulitis [L03.90] 05/08/2022     Priority: Medium       Assessment and Plan:     Acute diastolic CHF, new diagnosis  -symptoms of LE edema has been on-going for the last 1 year. Dyspnea has been occurring for the last several months. -LVEF 55-60%  -NYHA III  -hypervolemic. Will increase Lasix to 100mg IV BID. Strict I/O, daily wts, electrolyte repletion prn  -diuresed 8530cc net since admission but still hypervolemic. DMT2  -cont metoprolol, atorva 10    Will follow with you. Thank you for allowing me to participate in the care of this patient. If you have any questions, please do not hesitate to contact me.     Pedro Salazar MD, MS, [facility]  Cardiac Electrophysiology  [address]  [address] [facility], [address] [place] [address] Derian Kevin [facility] [address]  [phone]

## 2022-05-14 NOTE — PROGRESS NOTES
Pulmonary Progress Note    CC:  Follow up respiratory failure    Subjective:  3 liters of oxygen   Afebrile   Negative 8.5 liters since admission   Doing better. Not too SOB with exertion. Intake/Output Summary (Last 24 hours) at 5/14/2022 0854  Last data filed at 5/14/2022 0522  Gross per 24 hour   Intake 620 ml   Output 1500 ml   Net -880 ml         PHYSICAL EXAM:  Blood pressure 121/75, pulse 72, temperature 98.3 °F (36.8 °C), temperature source Oral, resp. rate 24, height 5' 6\" (1.676 m), weight 240 lb 4.8 oz (109 kg), SpO2 92 %.'  Gen: Chronically ill appearing   Eyes: PERRL. No sclera icterus. No conjunctival injection. ENT: No discharge. Pharynx clear. External appearance of ears and nose normal.  Neck: Trachea midline. No obvious mass. Resp: Faint crackles   CV: Regular rate. Regular rhythm. No murmur or rub. GI: Non-tender. Non-distended. No hernia. Skin: Pale  Lymph: No cervical LAD. No supraclavicular LAD. M/S: No cyanosis. No clubbing. No joint deformity. Neuro: Moves all four extremities. CN 2-12 tested, no defect noted.   Ext:   +++ edema    Medications:    Scheduled Meds:   amoxicillin-clavulanate  1 tablet Oral 2 times per day    neomycin-bacitracin-polymyxin   Topical Daily    furosemide  40 mg IntraVENous TID    metoprolol succinate  25 mg Oral Daily    lidocaine-EPINEPHrine  20 mL IntraDERmal Once    atorvastatin  10 mg Oral Daily    heparin (porcine)  5,000 Units SubCUTAneous 3 times per day    lisinopril  5 mg Oral Daily    miconazole   Topical BID    fluconazole  200 mg Oral Daily    sodium chloride flush  5-40 mL IntraVENous 2 times per day    multivitamin  1 tablet Oral Daily    thiamine  100 mg Oral Daily       Continuous Infusions:   sodium chloride 25 mL/hr (05/12/22 9404)       PRN Meds:  naloxone, acetaminophen **OR** acetaminophen, ondansetron, nicotine, sodium chloride flush, sodium chloride, naloxone    Labs:  CBC:   Recent Labs     05/12/22  0021 05/13/22  0524 05/14/22  0609   WBC 5.2 6.5 5.2   HGB 11.0* 10.8* 10.9*   HCT 36.3 35.0* 35.4*   MCV 74.6* 73.5* 73.7*    231 181     BMP:   Recent Labs     05/12/22  0536 05/13/22  0524 05/14/22  0609    143 141   K 3.7 3.6 3.5   CL 95* 95* 93*   CO2 35* 36* 37*   BUN 11 12 12   CREATININE 0.8 0.9 0.8     LIVER PROFILE: No results for input(s): AST, ALT, LIPASE, BILIDIR, BILITOT, ALKPHOS in the last 72 hours. Invalid input(s): AMYLASE,  ALB  PT/INR: No results for input(s): PROTIME, INR in the last 72 hours. APTT: No results for input(s): APTT in the last 72 hours. UA:No results for input(s): NITRITE, COLORU, PHUR, LABCAST, WBCUA, RBCUA, MUCUS, TRICHOMONAS, YEAST, BACTERIA, CLARITYU, SPECGRAV, LEUKOCYTESUR, UROBILINOGEN, BILIRUBINUR, BLOODU, GLUCOSEU, AMORPHOUS in the last 72 hours. Invalid input(s): Jose Leather  No results for input(s): PH, PCO2, PO2 in the last 72 hours. Films:  Chest imaging reports were reviewed and imaging was reviewed by me and showed right pleural effusion > left, ground glass, atelectasis    ABG:  Nothing new      I reviewed the labs and images listed above    Assessment/Plan:    Acute Hypoxic Respiratory Failure with saturations less than 90% on room air  Titrate oxygen for saturations greater than or equal to 90%  o May want to see if we can wean her off oxygen before DC   Acute on Chronic Hypercapnic Respiratory Failure   Pulmonary edema   o Aggressive diuresis until she becomes pre-renal   Right Pleural Effusion (unclear if malignant)   Abnormal CT Chest:  Effusions, ground glass  o Continue with aggressive diuresis    Tobacco Abuse   o Cessation.          DVT prophylaxis  Heparin       DO Kathryn Hitchcock Pulmonary

## 2022-05-14 NOTE — PROGRESS NOTES
Hospitalist Progress Note  5/14/2022 3:00 PM    PCP: No primary care provider on file. [phone]     Date of Admission: 5/8/2022                                                                                                                     HOSPITAL COURSE    Patient demographics:  The patient  Laura Mireles is a 55 y.o. female     Significant past medical history:   Patient Active Problem List   Diagnosis    Malignancy ([facility] [address]    Leg swelling    Cellulitis    New onset of congestive heart failure ([facility]    Acute on chronic diastolic heart failure (HCC)    Type 2 diabetes mellitus without complication, without long-term current use of insulin (HCC)    Aortic atherosclerosis (HCC)    Nicotine dependence    Iron deficiency anemia    Acute on chronic systolic heart failure (HCC)    Acute respiratory failure with hypoxia (HCC)    Acute on chronic respiratory failure with hypercapnia (HCC)    Acute pulmonary edema (HCC)    Pleural effusion, right         Presenting symptoms:  Leg swelling, shortness of breath, breast swelling and lesion    Diagnostic workup:      CONSULTS DURING ADMISSION :   IP CONSULT TO HEART FAILURE NURSE/COORDINATOR  IP CONSULT TO DIETITIAN  IP CONSULT TO INFECTIOUS DISEASES  IP CONSULT TO GENERAL SURGERY  IP CONSULT TO OB GYN  IP CONSULT TO ONCOLOGY  IP CONSULT TO CARDIOLOGY  IP CONSULT TO SOCIAL WORK  IP CONSULT TO PULMONOLOGY  IP CONSULT TO PALLIATIVE CARE  IP CONSULT TO [place]      Patient was diagnosed with:        Treatment while inpatient:                                                                                         ----------------------------------------------------------      SUBJECTIVE COMPLAINTS- follow up for CHF. Feels ok. Less sob    Diet: ADULT DIET; Regular; 4 carb choices (60 gm/meal); Low Sodium (2 gm); 1500 ml  ADULT ORAL NUTRITION SUPPLEMENT; Lunch;  Low Calorie/High Protein Oral Supplement      OBJECTIVE:   Patient Active Problem List   Diagnosis    [facility]ign[facility] ([facility] [facility]    Leg swelling    Cellulitis    New onset of congestive heart failure (HCC)    Acute on chronic diastolic heart failure (HCC)    Type 2 diabetes mellitus without complication, without long-term current use of insulin (HCC)    Aortic atherosclerosis (HCC)    Nicotine dependence    Iron deficiency anemia    Acute on chronic systolic heart failure (HCC)    Acute respiratory failure with hypoxia (HCC)    Acute on chronic respiratory failure with hypercapnia (HCC)    Acute pulmonary edema (HCC)    Pleural effusion, right       Allergies  Patient has no known allergies. Medications    Scheduled Meds:   furosemide  100 mg IntraVENous BID    amoxicillin-clavulanate  1 tablet Oral 2 times per day    neomycin-bacitracin-polymyxin   Topical Daily    metoprolol succinate  25 mg Oral Daily    lidocaine-EPINEPHrine  20 mL IntraDERmal Once    atorvastatin  10 mg Oral Daily    heparin (porcine)  5,000 Units SubCUTAneous 3 times per day    lisinopril  5 mg Oral Daily    miconazole   Topical BID    fluconazole  200 mg Oral Daily    sodium chloride flush  5-40 mL IntraVENous 2 times per day    multivitamin  1 tablet Oral Daily    thiamine  100 mg Oral Daily     Continuous Infusions:   sodium chloride 25 mL/hr (05/12/22 2109)     PRN Meds:  naloxone, acetaminophen **OR** acetaminophen, ondansetron, nicotine, sodium chloride flush, sodium chloride, naloxone    Vitals   Vitals /wt   Patient Vitals for the past 8 hrs:   Resp SpO2   05/14/22 1432 23 95 %        72HR INTAKE/OUTPUT:      Intake/Output Summary (Last 24 hours) at 5/14/2022 1500  Last data filed at 5/14/2022 1445  Gross per 24 hour   Intake --   Output 3000 ml   Net -3000 ml       Exam:    Gen:   Alert and oriented ×3  Eyes: PERRL. No sclera icterus. No conjunctival injection. ENT: No discharge. Pharynx clear. External appearance of ears and nose normal.  Neck: Trachea midline. No obvious mass.     Resp: No accessory muscle use. No crackles. No wheezes. No rhonchi. CV: Regular rate. Regular rhythm. No murmur or rub. No edema. GI: Non-tender. Non-distended. No hernia. Skin: Warm, dry, normal texture and turgor. Lymph: No cervical LAD. No supraclavicular LAD. M/S: / Ext. No cyanosis. No clubbing. No joint deformity. Neuro: CN 2-12 are intact,  no neurologic deficits noted. PT/INR:   No results for input(s): PROTIME, INR in the last 72 hours. APTT:   No results for input(s): APTT in the last 72 hours. CBC:   Recent Labs     05/12/22  0536 05/13/22  0524 05/14/22  0609   WBC 5.2 6.5 5.2   HGB 11.0* 10.8* 10.9*   HCT 36.3 35.0* 35.4*   MCV 74.6* 73.5* 73.7*    231 181       BMP:   Recent Labs     05/12/22  0536 05/13/22  0524 05/14/22  0609    143 141   K 3.7 3.6 3.5   CL 95* 95* 93*   CO2 35* 36* 37*   BUN 11 12 12   CREATININE 0.8 0.9 0.8       LIVER PROFILE:   No results for input(s): ALKPHOS, AST, ALT, ALB, BILIDIR, BILITOT, ALKPHOS in the last 72 hours. No results for input(s): AMYLASE in the last 72 hours. No results for input(s): LIPASE in the last 72 hours. UA:  No results for input(s): NITRITE, LABCAST, WBCUA, RBCUA, MUCUS in the last 72 hours. TROPONIN:   No results for input(s): Mechelle Pierre in the last 72 hours. Lab Results   Component Value Date/Time    URRFLXCULT Not Indicated 05/08/2022 04:45 PM       No results for input(s): TSHREFLEX in the last 72 hours. No components found for: [id number]  POC GLUCOSE:    No results for input(s): POCGLU in the last 72 hours. No results for input(s): LABA1C in the last 72 hours. Lab Results   Component Value Date    LABA1C 7.0 05/09/2022         ASSESSMENT AND PLAN  Acute on chronic systolic CHF  Increased diuretics per cardiology  Echocardiogram shows normal ejection fraction.   Effusion and anasarca  Continue on diuretics  Weight improved to 256-244  Significantly fluid overloaded    Acute respiratory failure with hypoxia and hypercapnia  Likely due to CHF  Oxygen needs are decreasing with diuresis      Leg swelling  Likely related to CHF      Breast cellulitis  Continue with antibiotics  Cefepime metronidazole and fluconazole  Infectious diseases following    Breast cancer  Right breast fungating mass  Patient needs surgery and radiation treatment  PET scan as an outpatient  Hematology oncology is following        Pneumonia  Continue antibiotics  Patient started on vancomycin/cefepime/Flagyl and fluconazole, started on miconazole powder for breast cellulitis        Smoking abuse    Altered mental status      Tobacco abuse Z72.0  Nicotine patch and counseling      Consult to infectious disease  Consult to cardiology  Consult oncology  Consult to general surgery  Consult to OB/GYN  Pulmonary consult        Code Status: Full Code        Dispo -patient agreed to bone scan and wants to proceed with treatment        The patient and / or the family were informed of the results of any tests, a time was given to answer questions, a plan was proposed and they agreed with plan. Kristie Moran MD    This note was transcribed using [id number] [employer]. Please disregard any translational errors.

## 2022-05-15 LAB
ANION GAP SERPL CALCULATED.3IONS-SCNC: 7 MMOL/L (ref 3–16)
BASOPHILS ABSOLUTE: 0 K/UL (ref 0–0.2)
BASOPHILS RELATIVE PERCENT: 1 %
BUN BLDV-MCNC: 11 MG/DL (ref 7–20)
CALCIUM SERPL-MCNC: 9.3 MG/DL (ref 8.3–10.6)
CHLORIDE BLD-SCNC: 93 MMOL/L (ref 99–110)
CO2: 41 MMOL/L (ref 21–32)
CREAT SERPL-MCNC: 0.7 MG/DL (ref 0.6–1.1)
EOSINOPHILS ABSOLUTE: 0.1 K/UL (ref 0–0.6)
EOSINOPHILS RELATIVE PERCENT: 3.1 %
GFR AFRICAN AMERICAN: >60
GFR NON-AFRICAN AMERICAN: >60
GLUCOSE BLD-MCNC: 84 MG/DL (ref 70–99)
HCT VFR BLD CALC: 34.3 % (ref 36–48)
HEMOGLOBIN: 10.6 G/DL (ref 12–16)
LYMPHOCYTES ABSOLUTE: 0.8 K/UL (ref 1–5.1)
LYMPHOCYTES RELATIVE PERCENT: 18.6 %
MAGNESIUM: 1.6 MG/DL (ref 1.8–2.4)
MCH RBC QN AUTO: 22.7 PG (ref 26–34)
MCHC RBC AUTO-ENTMCNC: 31 G/DL (ref 31–36)
MCV RBC AUTO: 73.3 FL (ref 80–100)
MONOCYTES ABSOLUTE: 0.5 K/UL (ref 0–1.3)
MONOCYTES RELATIVE PERCENT: 12.8 %
NEUTROPHILS ABSOLUTE: 2.6 K/UL (ref 1.7–7.7)
NEUTROPHILS RELATIVE PERCENT: 64.5 %
PDW BLD-RTO: 19.3 % (ref 12.4–15.4)
PLATELET # BLD: 172 K/UL (ref 135–450)
PMV BLD AUTO: 7.8 FL (ref 5–10.5)
POTASSIUM SERPL-SCNC: 3.5 MMOL/L (ref 3.5–5.1)
RBC # BLD: 4.68 M/UL (ref 4–5.2)
SODIUM BLD-SCNC: 141 MMOL/L (ref 136–145)
WBC # BLD: 4.1 K/UL (ref 4–11)

## 2022-05-15 PROCEDURE — 6360000002 HC RX W HCPCS: Performed by: INTERNAL MEDICINE

## 2022-05-15 PROCEDURE — 6370000000 HC RX 637 (ALT 250 FOR IP): Performed by: STUDENT IN AN ORGANIZED HEALTH CARE EDUCATION/TRAINING PROGRAM

## 2022-05-15 PROCEDURE — 6370000000 HC RX 637 (ALT 250 FOR IP): Performed by: INTERNAL MEDICINE

## 2022-05-15 PROCEDURE — 2060000000 HC ICU INTERMEDIATE R&B

## 2022-05-15 PROCEDURE — 2700000000 HC OXYGEN THERAPY PER DAY

## 2022-05-15 PROCEDURE — 94761 N-INVAS EAR/PLS OXIMETRY MLT: CPT

## 2022-05-15 PROCEDURE — 6360000002 HC RX W HCPCS: Performed by: STUDENT IN AN ORGANIZED HEALTH CARE EDUCATION/TRAINING PROGRAM

## 2022-05-15 PROCEDURE — 99232 SBSQ HOSP IP/OBS MODERATE 35: CPT | Performed by: INTERNAL MEDICINE

## 2022-05-15 PROCEDURE — 83735 ASSAY OF MAGNESIUM: CPT

## 2022-05-15 PROCEDURE — 80048 BASIC METABOLIC PNL TOTAL CA: CPT

## 2022-05-15 PROCEDURE — 2580000003 HC RX 258: Performed by: STUDENT IN AN ORGANIZED HEALTH CARE EDUCATION/TRAINING PROGRAM

## 2022-05-15 PROCEDURE — 85025 COMPLETE CBC W/AUTO DIFF WBC: CPT

## 2022-05-15 PROCEDURE — 36415 COLL VENOUS BLD VENIPUNCTURE: CPT

## 2022-05-15 RX ADMIN — AMOXICILLIN AND CLAVULANATE POTASSIUM 1 TABLET: 875; 125 TABLET, FILM COATED ORAL at 09:06

## 2022-05-15 RX ADMIN — THIAMINE HCL TAB 100 MG 100 MG: 100 TAB at 09:06

## 2022-05-15 RX ADMIN — AMOXICILLIN AND CLAVULANATE POTASSIUM 1 TABLET: 875; 125 TABLET, FILM COATED ORAL at 21:03

## 2022-05-15 RX ADMIN — HEPARIN SODIUM 5000 UNITS: 5000 INJECTION INTRAVENOUS; SUBCUTANEOUS at 15:41

## 2022-05-15 RX ADMIN — HEPARIN SODIUM 5000 UNITS: 5000 INJECTION INTRAVENOUS; SUBCUTANEOUS at 21:03

## 2022-05-15 RX ADMIN — ATORVASTATIN CALCIUM 10 MG: 10 TABLET, FILM COATED ORAL at 09:06

## 2022-05-15 RX ADMIN — FUROSEMIDE 100 MG: 10 INJECTION, SOLUTION INTRAMUSCULAR; INTRAVENOUS at 18:28

## 2022-05-15 RX ADMIN — METOPROLOL SUCCINATE 25 MG: 25 TABLET, EXTENDED RELEASE ORAL at 09:06

## 2022-05-15 RX ADMIN — FLUCONAZOLE 200 MG: 100 TABLET ORAL at 21:03

## 2022-05-15 RX ADMIN — Medication 10 ML: at 09:07

## 2022-05-15 RX ADMIN — MICONAZOLE NITRATE: 2 POWDER TOPICAL at 09:06

## 2022-05-15 RX ADMIN — FUROSEMIDE 100 MG: 10 INJECTION, SOLUTION INTRAMUSCULAR; INTRAVENOUS at 09:06

## 2022-05-15 RX ADMIN — Medication 10 ML: at 21:03

## 2022-05-15 RX ADMIN — POLYMYXIN B SULFATE, BACITRACIN ZINC, NEOMYCIN SULFATE: 5000; 3.5; 4 OINTMENT TOPICAL at 09:06

## 2022-05-15 RX ADMIN — THERA TABS 1 TABLET: TAB at 09:06

## 2022-05-15 RX ADMIN — MICONAZOLE NITRATE: 2 POWDER TOPICAL at 21:08

## 2022-05-15 RX ADMIN — HEPARIN SODIUM 5000 UNITS: 5000 INJECTION INTRAVENOUS; SUBCUTANEOUS at 06:05

## 2022-05-15 RX ADMIN — LISINOPRIL 5 MG: 5 TABLET ORAL at 09:06

## 2022-05-15 ASSESSMENT — ENCOUNTER SYMPTOMS
NAUSEA: 0
VOMITING: 0
ABDOMINAL PAIN: 0
SHORTNESS OF BREATH: 1
EYES NEGATIVE: 1

## 2022-05-15 NOTE — PLAN OF CARE
Problem: Discharge Planning  Goal: Discharge to home or other facility with appropriate resources  Outcome: Progressing     Problem: Safety - Adult  Goal: Free from fall injury  Outcome: Progressing  Flowsheets  Taken 5/15/2022 0359 by Eulogio Fenton RN  Free From Fall Injury: Instruct family/caregiver on patient safety  Taken 5/14/2022 1452 by Jimenez Hale RN  Free From Fall Injury: Instruct family/caregiver on patient safety     Problem: ABCDS Injury Assessment  Goal: Absence of physical injury  Outcome: Progressing  Flowsheets  Taken 5/15/2022 0359 by Eluogio Fenton RN  Absence of Physical Injury: Implement safety measures based on patient assessment  Taken 5/14/2022 1452 by Jimenez Hale RN  Absence of Physical Injury: Implement safety measures based on patient assessment     Problem: Skin/Tissue Integrity  Goal: Absence of new skin breakdown  Description: 1. Monitor for areas of redness and/or skin breakdown  2. Assess vascular access sites hourly  3. Every 4-6 hours minimum:  Change oxygen saturation probe site  4. Every 4-6 hours:  If on nasal continuous positive airway pressure, respiratory therapy assess nares and determine need for appliance change or resting period.   Outcome: Progressing     Problem: Neurosensory - Adult  Goal: Achieves stable or improved neurological status  Outcome: Progressing     Problem: Neurosensory - Adult  Goal: Absence of seizures  Outcome: Progressing     Problem: Neurosensory - Adult  Goal: Remains free of injury related to seizures activity  Outcome: Progressing

## 2022-05-15 NOTE — PROGRESS NOTES
Hospitalist Progress Note  5/15/2022 11:23 AM    PCP: No primary care provider on file. [phone]     Date of Admission: 5/8/2022                                                                                                                     HOSPITAL COURSE    Patient demographics:  The patient  Jacob Boyer is a 55 y.o. female     Significant past medical history:   Patient Active Problem List   Diagnosis    [facility]ign[facility] ([facility]    Leg swelling    Cellulitis    New onset of congestive heart failure ([facility]    Acute on chronic diastolic heart failure ([facility]    Type 2 diabetes mellitus without complication, without long-term current use of insulin ([facility] [facility]    Aortic atherosclerosis ([facility]    Nicotine dependence    Iron deficiency anemia    Acute on chronic systolic heart failure (HCC)    Acute respiratory failure with hypoxia (HCC)    Acute on chronic respiratory failure with hypercapnia (HCC)    Acute pulmonary edema (HCC)    Pleural effusion, right         Presenting symptoms:  Leg swelling, shortness of breath, breast swelling and lesion    Diagnostic workup:      CONSULTS DURING ADMISSION :   IP CONSULT TO HEART FAILURE NURSE/COORDINATOR  IP CONSULT TO DIETITIAN  IP CONSULT TO INFECTIOUS DISEASES  IP CONSULT TO GENERAL SURGERY  IP CONSULT TO OB GYN  IP CONSULT TO ONCOLOGY  IP CONSULT TO CARDIOLOGY  IP CONSULT TO SOCIAL WORK  IP CONSULT TO PULMONOLOGY  IP CONSULT TO PALLIATIVE CARE  IP CONSULT TO [place]      Patient was diagnosed with:        Treatment while inpatient:                                                                                         ----------------------------------------------------------      SUBJECTIVE COMPLAINTS- still has some mild sob    Diet: ADULT DIET; Regular; 4 carb choices (60 gm/meal); Low Sodium (2 gm); 1500 ml  ADULT ORAL NUTRITION SUPPLEMENT; Lunch;  Low Calorie/High Protein Oral Supplement      OBJECTIVE:   Patient Active Problem List Diagnosis    Malignancy ([facility] [facility]    Leg swelling    Cellulitis    New onset of congestive heart failure (HCC)    Acute on chronic diastolic heart failure (HCC)    Type 2 diabetes mellitus without complication, without long-term current use of insulin (HCC)    Aortic atherosclerosis (HCC)    Nicotine dependence    Iron deficiency anemia    Acute on chronic systolic heart failure (HCC)    Acute respiratory failure with hypoxia (HCC)    Acute on chronic respiratory failure with hypercapnia (HCC)    Acute pulmonary edema (HCC)    Pleural effusion, right       Allergies  Patient has no known allergies. Medications    Scheduled Meds:   furosemide  100 mg IntraVENous BID    amoxicillin-clavulanate  1 tablet Oral 2 times per day    neomycin-bacitracin-polymyxin   Topical Daily    metoprolol succinate  25 mg Oral Daily    lidocaine-EPINEPHrine  20 mL IntraDERmal Once    atorvastatin  10 mg Oral Daily    heparin (porcine)  5,000 Units SubCUTAneous 3 times per day    lisinopril  5 mg Oral Daily    miconazole   Topical BID    fluconazole  200 mg Oral Daily    sodium chloride flush  5-40 mL IntraVENous 2 times per day    multivitamin  1 tablet Oral Daily    thiamine  100 mg Oral Daily     Continuous Infusions:   sodium chloride 25 mL/hr (05/12/22 2109)     PRN Meds:  naloxone, acetaminophen **OR** acetaminophen, ondansetron, nicotine, sodium chloride flush, sodium chloride, naloxone    Vitals   Vitals /wt   Patient Vitals for the past 8 hrs:   Resp SpO2 Weight   05/15/22 0606 -- -- 229 lb 0.9 oz (103.9 kg)   05/15/22 0406 25 91 % --        72HR INTAKE/OUTPUT:      Intake/Output Summary (Last 24 hours) at 5/15/2022 1123  Last data filed at 5/15/2022 0084  Gross per 24 hour   Intake 690 ml   Output 3100 ml   Net -2410 ml       Exam:    Gen:   Alert and oriented ×3  Eyes: PERRL. No sclera icterus. No conjunctival injection. ENT: No discharge. Pharynx clear.  External appearance of ears and nose normal.  Neck: Trachea midline. No obvious mass. Resp: rales b/l  CV: Regular rate. Regular rhythm. No murmur or rub. No edema. GI: Non-tender. Non-distended. No hernia. Skin: Warm, dry, normal texture and turgor. Lymph: No cervical LAD. No supraclavicular LAD. M/S: / Ext. No cyanosis. No clubbing. No joint deformity. Neuro: CN 2-12 are intact,  no neurologic deficits noted. PT/INR:   No results for input(s): PROTIME, INR in the last 72 hours. APTT:   No results for input(s): APTT in the last 72 hours. CBC:   Recent Labs     05/13/22  0524 05/14/22  0609 05/15/22  0721   WBC 6.5 5.2 4.1   HGB 10.8* 10.9* 10.6*   HCT 35.0* 35.4* 34.3*   MCV 73.5* 73.7* 73.3*    181 172       BMP:   Recent Labs     05/13/22  0524 05/14/22  0609 05/15/22  0721    141 141   K 3.6 3.5 3.5   CL 95* 93* 93*   CO2 36* 37* 41*   BUN 12 12 11   CREATININE 0.9 0.8 0.7       LIVER PROFILE:   No results for input(s): ALKPHOS, AST, ALT, ALB, BILIDIR, BILITOT, ALKPHOS in the last 72 hours. No results for input(s): AMYLASE in the last 72 hours. No results for input(s): LIPASE in the last 72 hours. UA:  No results for input(s): NITRITE, LABCAST, WBCUA, RBCUA, MUCUS in the last 72 hours. TROPONIN:   No results for input(s): Connee Matar in the last 72 hours. Lab Results   Component Value Date/Time    URRFLXCULT Not Indicated 05/08/2022 04:45 PM       No results for input(s): TSHREFLEX in the last 72 hours. No components found for: [id number]  POC GLUCOSE:    Recent Labs     05/14/22  1653   POCGLU 121*     No results for input(s): LABA1C in the last 72 hours.    Lab Results   Component Value Date    LABA1C 7.0 05/09/2022         ASSESSMENT AND PLAN  Acute on chronic systolic CHF  Increased diuretics per cardiology  Continue on diuretics  Good diuretic response but still sounds wet    Acute respiratory failure with hypoxia and hypercapnia  Likely due to CHF  Oxygen needs are decreasing with diuresis      Leg swelling  Likely related to CHF      Breast cellulitis  Continue with antibiotics  Cefepime metronidazole and fluconazole  Infectious diseases following    Breast cancer  Right breast fungating mass  Patient needs surgery and radiation treatment  PET scan as an outpatient  Hematology oncology is following        Pneumonia  Continue antibiotics  Patient started on vancomycin/cefepime/Flagyl and fluconazole, started on miconazole powder for breast cellulitis        Smoking abuse    Altered mental status      Tobacco abuse Z72.0  Nicotine patch and counseling      Consult to infectious disease  Consult to cardiology  Consult oncology  Consult to general surgery  Consult to OB/GYN  Pulmonary consult        Code Status: Full Code        Dispo -patient agreed to bone scan and wants to proceed with treatment        The patient and / or the family were informed of the results of any tests, a time was given to answer questions, a plan was proposed and they agreed with plan. Dominguez Cordova MD    This note was transcribed using [employer]. Please disregard any translational errors.

## 2022-05-15 NOTE — PROGRESS NOTES
Cardiology Progress Note     Admit Date: 2022     Reason for follow up: new diagnosis of acute diastolic CHF    HPI and Interval History:   50yo F h/o chronic dCHF presents with LE edema x 1 year and dyspnea and diagnosed with new diagnosis acute diastolic CHF. Interval History: Patient seen and examined. Clinical notes reviewed. Telemetry reviewed - SR 70's bpm. No new complaint today. No major events overnight. Denies having angina, shortness of breath, dyspnea on exertion, Orthopnea, PND at the time of this visit. Review of System:  All other systems reviewed except for that noted above. Pertinent negatives and positives are:     · General: negative for fever, chills   · Ophthalmic ROS: negative for - eye pain or loss of vision  · ENT ROS: negative for - headaches, sore throat   · Respiratory: negative for - cough, sputum  · Cardiovascular: Reviewed in HPI  · Gastrointestinal: negative for - abdominal pain, diarrhea, N/V  · Hematology: negative for - bleeding, blood clots, bruising or jaundice  · Genito-Urinary:  negative for - Dysuria or incontinence  · Musculoskeletal: negative for - Joint swelling, muscle pain  · Neurological: negative for - confusion, dizziness, headaches   · Psychiatric: No anxiety, no depression. · Dermatological: negative for - rash      Physical Examination:  Vitals:    05/15/22 0406   BP:    Pulse:    Resp: 25   Temp:    SpO2: 91%        Intake/Output Summary (Last 24 hours) at 5/15/2022 0913  Last data filed at 5/15/2022 6793  Gross per 24 hour   Intake 930 ml   Output 3100 ml   Net -2170 ml     In: 930 [P.O.:930]  Out: 3100    Wt Readings from Last 3 Encounters:   05/15/22 229 lb 0.9 oz (103.9 kg)     Temp  Av.9 °F (36.6 °C)  Min: 97.6 °F (36.4 °C)  Max: 98.1 °F (36.7 °C)  Pulse  Av  Min: 76  Max: 82  BP  Min: 130/65  Max: 134/68  SpO2  Av %  Min: 90 %  Max: 95 %    · Telemetry: Sinus rhythm with v-rates 70's bpm  · Constitutional: Alert.  Oriented to person, place, and time. No distress. · Head: Normocephalic and atraumatic. · Mouth/Throat: Lips appear moist. Oropharynx is clear and moist.  · Eyes: Conjunctivae normal. EOM are normal.   · Neck: Neck supple. No lymphadenopathy. No rigidity. 15cm JVD present. · Cardiovascular: Normal rate, regular rhythm. Normal S1&S2. Carotid pulse 2+ bilaterally. · Pulmonary/Chest: Bilateral respiratory sounds present. No respiratory accessory muscle use. No wheezes, No rhonchi. + rales R base. None on L base. · Abdominal: Soft. Normal bowel sounds present. No distension, No tenderness. No splenomegaly. No hernia. · Musculoskeletal: No tenderness. Full range of motion in bilateral upper and lower extremities. 2+ pitting BLE edema    · Lymphadenopathy: Has no cervical adenopathy. · Neurological: Alert and oriented. Cranial nerve II-XII grossly intact, No gross deficit to touch. · Skin: Skin is warm and dry. No rash, lesions, ulcerations noted. · Psychiatric: No anxiety nor agitation. Labs, telemetry, diagnostic and imaging results personally reviewed and interpreted. Recent Labs     05/13/22  0524 05/14/22  0609 05/15/22  0721    141 141   K 3.6 3.5 3.5   CL 95* 93* 93*   CO2 36* 37* 41*   BUN 12 12 11   CREATININE 0.9 0.8 0.7     Recent Labs     05/13/22  0524 05/14/22  0609 05/15/22  0721   WBC 6.5 5.2 4.1   HGB 10.8* 10.9* 10.6*   HCT 35.0* 35.4* 34.3*   MCV 73.5* 73.7* 73.3*    181 172     Lab Results   Component Value Date    TROPONINI 0.01 05/08/2022     Estimated Creatinine Clearance: 111 mL/min (based on SCr of 0.7 mg/dL).    No results found for: BNP  Lab Results   Component Value Date    PROTIME 16.2 05/08/2022    INR 1.41 05/08/2022     Lab Results   Component Value Date    CHOL 111 05/09/2022    HDL 26 05/09/2022    TRIG 90 05/09/2022       Scheduled Meds:   furosemide  100 mg IntraVENous BID    amoxicillin-clavulanate  1 tablet Oral 2 times per day    neomycin-bacitracin-polymyxin Topical Daily    metoprolol succinate  25 mg Oral Daily    lidocaine-EPINEPHrine  20 mL IntraDERmal Once    atorvastatin  10 mg Oral Daily    heparin (porcine)  5,000 Units SubCUTAneous 3 times per day    lisinopril  5 mg Oral Daily    miconazole   Topical BID    fluconazole  200 mg Oral Daily    sodium chloride flush  5-40 mL IntraVENous 2 times per day    multivitamin  1 tablet Oral Daily    thiamine  100 mg Oral Daily     Continuous Infusions:   sodium chloride 25 mL/hr (05/12/22 2109)     PRN Meds:naloxone, acetaminophen **OR** acetaminophen, ondansetron, nicotine, sodium chloride flush, sodium chloride, naloxone     Patient Active Problem List    Diagnosis Date Noted    Acute respiratory failure with hypoxia ([facility]     Acute on chronic respiratory failure with hypercapnia (HCC)     Acute pulmonary edema (HCC)     Pleural effusion, right     Acute on chronic systolic heart failure (HCC)     Iron deficiency anemia     New onset of congestive heart failure (HCC)     Acute on chronic diastolic heart failure (HCC)     Type 2 diabetes mellitus without complication, without long-term current use of insulin (HCC)     Aortic atherosclerosis (HCC)     Nicotine dependence     Malignancy ([facility] 05/08/2022    Leg swelling 05/08/2022    Cellulitis 05/08/2022      Active Hospital Problems    Diagnosis Date Noted    Acute respiratory failure with hypoxia (HCC) [J96.01]      Priority: Medium    Acute on chronic respiratory failure with hypercapnia (HCC) [J96.22]      Priority: Medium    Acute pulmonary edema ([facility] [J81.0]      Priority: Medium    Pleural effusion, right [J90]      Priority: Medium    Acute on chronic systolic heart failure (HCC) [I50.23]      Priority: Medium    Iron deficiency anemia [D50.9]      Priority: Medium    New onset of congestive heart failure ([facility] [I50.9]      Priority: Medium    Acute on chronic diastolic heart failure (HCC) [I50.33]      Priority: Medium    Type 2 diabetes mellitus without complication, without long-term current use of insulin (HCC) [E11.9]      Priority: Medium    Aortic atherosclerosis (HCC) [I70.0]      Priority: Medium    Nicotine dependence [F17.200]      Priority: Medium    Malignancy ([address] [C80.1] 05/08/2022     Priority: Medium    Leg swelling [M79.89] 05/08/2022     Priority: Medium    Cellulitis [L03.90] 05/08/2022     Priority: Medium       Assessment and Plan:     Acute diastolic CHF, new diagnosis  -symptoms of LE edema x 1 year. Dyspnea has been occurring for last several months  -LVEF 55-60%  -NYHA III  -hypervolemic but improving. Diuresed 2170cc net negative last 24hrs. Will continue Lasix 100mg IV BID. Strict I/O, daily wts, electrolyte repletion prn    DMT2  -cont Metoprolol, atorva 10    Will follow with you. Thank you for allowing me to participate in the care of this patient. If you have any questions, please do not hesitate to contact me.     Nhung Ahumada MD, MS, [facility], [facility]  Cardiac Electrophysiology  [address]  [address] [place], [address] [facility]  Derian Buitrago [facility] [address]  [phone]

## 2022-05-16 ENCOUNTER — TELEPHONE (OUTPATIENT)
Dept: BREAST CENTER | Age: 57
End: 2022-05-16

## 2022-05-16 LAB
ANION GAP SERPL CALCULATED.3IONS-SCNC: 9 MMOL/L (ref 3–16)
BASOPHILS ABSOLUTE: 0 K/UL (ref 0–0.2)
BASOPHILS RELATIVE PERCENT: 1.1 %
BUN BLDV-MCNC: 10 MG/DL (ref 7–20)
CALCIUM SERPL-MCNC: 9.3 MG/DL (ref 8.3–10.6)
CHLORIDE BLD-SCNC: 91 MMOL/L (ref 99–110)
CO2: 42 MMOL/L (ref 21–32)
CREAT SERPL-MCNC: 0.6 MG/DL (ref 0.6–1.1)
EOSINOPHILS ABSOLUTE: 0.1 K/UL (ref 0–0.6)
EOSINOPHILS RELATIVE PERCENT: 2.8 %
GFR AFRICAN AMERICAN: >60
GFR NON-AFRICAN AMERICAN: >60
GLUCOSE BLD-MCNC: 87 MG/DL (ref 70–99)
HCT VFR BLD CALC: 35.6 % (ref 36–48)
HEMOGLOBIN: 11 G/DL (ref 12–16)
LYMPHOCYTES ABSOLUTE: 0.8 K/UL (ref 1–5.1)
LYMPHOCYTES RELATIVE PERCENT: 19.6 %
MAGNESIUM: 1.6 MG/DL (ref 1.8–2.4)
MCH RBC QN AUTO: 22.7 PG (ref 26–34)
MCHC RBC AUTO-ENTMCNC: 30.9 G/DL (ref 31–36)
MCV RBC AUTO: 73.6 FL (ref 80–100)
MONOCYTES ABSOLUTE: 0.5 K/UL (ref 0–1.3)
MONOCYTES RELATIVE PERCENT: 11.8 %
NEUTROPHILS ABSOLUTE: 2.8 K/UL (ref 1.7–7.7)
NEUTROPHILS RELATIVE PERCENT: 64.7 %
PDW BLD-RTO: 19.5 % (ref 12.4–15.4)
PLATELET # BLD: 172 K/UL (ref 135–450)
PMV BLD AUTO: 8 FL (ref 5–10.5)
POTASSIUM SERPL-SCNC: 3.4 MMOL/L (ref 3.5–5.1)
RBC # BLD: 4.84 M/UL (ref 4–5.2)
SODIUM BLD-SCNC: 142 MMOL/L (ref 136–145)
WBC # BLD: 4.2 K/UL (ref 4–11)

## 2022-05-16 PROCEDURE — 6360000002 HC RX W HCPCS: Performed by: INTERNAL MEDICINE

## 2022-05-16 PROCEDURE — 80048 BASIC METABOLIC PNL TOTAL CA: CPT

## 2022-05-16 PROCEDURE — 99233 SBSQ HOSP IP/OBS HIGH 50: CPT | Performed by: INTERNAL MEDICINE

## 2022-05-16 PROCEDURE — 85025 COMPLETE CBC W/AUTO DIFF WBC: CPT

## 2022-05-16 PROCEDURE — 36415 COLL VENOUS BLD VENIPUNCTURE: CPT

## 2022-05-16 PROCEDURE — 6370000000 HC RX 637 (ALT 250 FOR IP): Performed by: INTERNAL MEDICINE

## 2022-05-16 PROCEDURE — 6370000000 HC RX 637 (ALT 250 FOR IP): Performed by: STUDENT IN AN ORGANIZED HEALTH CARE EDUCATION/TRAINING PROGRAM

## 2022-05-16 PROCEDURE — 97110 THERAPEUTIC EXERCISES: CPT

## 2022-05-16 PROCEDURE — 6360000002 HC RX W HCPCS: Performed by: STUDENT IN AN ORGANIZED HEALTH CARE EDUCATION/TRAINING PROGRAM

## 2022-05-16 PROCEDURE — 2580000003 HC RX 258: Performed by: INTERNAL MEDICINE

## 2022-05-16 PROCEDURE — 2060000000 HC ICU INTERMEDIATE R&B

## 2022-05-16 PROCEDURE — 97530 THERAPEUTIC ACTIVITIES: CPT

## 2022-05-16 PROCEDURE — 83735 ASSAY OF MAGNESIUM: CPT

## 2022-05-16 PROCEDURE — 2580000003 HC RX 258: Performed by: STUDENT IN AN ORGANIZED HEALTH CARE EDUCATION/TRAINING PROGRAM

## 2022-05-16 RX ORDER — POTASSIUM CHLORIDE 20 MEQ/1
20 TABLET, EXTENDED RELEASE ORAL
Status: DISCONTINUED | OUTPATIENT
Start: 2022-05-17 | End: 2022-05-18 | Stop reason: HOSPADM

## 2022-05-16 RX ORDER — FUROSEMIDE 40 MG/1
40 TABLET ORAL 2 TIMES DAILY
Status: DISCONTINUED | OUTPATIENT
Start: 2022-05-16 | End: 2022-05-16

## 2022-05-16 RX ORDER — MAGNESIUM SULFATE IN WATER 40 MG/ML
4000 INJECTION, SOLUTION INTRAVENOUS ONCE
Status: COMPLETED | OUTPATIENT
Start: 2022-05-16 | End: 2022-05-16

## 2022-05-16 RX ORDER — ACETAZOLAMIDE 500 MG/5ML
500 INJECTION, POWDER, LYOPHILIZED, FOR SOLUTION INTRAVENOUS EVERY 8 HOURS
Status: COMPLETED | OUTPATIENT
Start: 2022-05-16 | End: 2022-05-17

## 2022-05-16 RX ORDER — POTASSIUM CHLORIDE 20 MEQ/1
40 TABLET, EXTENDED RELEASE ORAL EVERY 4 HOURS
Status: COMPLETED | OUTPATIENT
Start: 2022-05-16 | End: 2022-05-16

## 2022-05-16 RX ORDER — FUROSEMIDE 40 MG/1
80 TABLET ORAL 2 TIMES DAILY
Status: DISCONTINUED | OUTPATIENT
Start: 2022-05-16 | End: 2022-05-18 | Stop reason: HOSPADM

## 2022-05-16 RX ADMIN — HEPARIN SODIUM 5000 UNITS: 5000 INJECTION INTRAVENOUS; SUBCUTANEOUS at 13:41

## 2022-05-16 RX ADMIN — HEPARIN SODIUM 5000 UNITS: 5000 INJECTION INTRAVENOUS; SUBCUTANEOUS at 21:42

## 2022-05-16 RX ADMIN — METOPROLOL SUCCINATE 25 MG: 25 TABLET, EXTENDED RELEASE ORAL at 09:18

## 2022-05-16 RX ADMIN — POTASSIUM CHLORIDE 40 MEQ: 20 TABLET, EXTENDED RELEASE ORAL at 17:44

## 2022-05-16 RX ADMIN — THIAMINE HCL TAB 100 MG 100 MG: 100 TAB at 09:18

## 2022-05-16 RX ADMIN — MAGNESIUM SULFATE HEPTAHYDRATE 4000 MG: 40 INJECTION, SOLUTION INTRAVENOUS at 13:52

## 2022-05-16 RX ADMIN — HEPARIN SODIUM 5000 UNITS: 5000 INJECTION INTRAVENOUS; SUBCUTANEOUS at 05:34

## 2022-05-16 RX ADMIN — MICONAZOLE NITRATE: 2 POWDER TOPICAL at 21:43

## 2022-05-16 RX ADMIN — FUROSEMIDE 80 MG: 40 TABLET ORAL at 17:44

## 2022-05-16 RX ADMIN — FLUCONAZOLE 200 MG: 100 TABLET ORAL at 21:42

## 2022-05-16 RX ADMIN — Medication 10 ML: at 09:19

## 2022-05-16 RX ADMIN — ATORVASTATIN CALCIUM 10 MG: 10 TABLET, FILM COATED ORAL at 09:18

## 2022-05-16 RX ADMIN — THERA TABS 1 TABLET: TAB at 09:19

## 2022-05-16 RX ADMIN — AMOXICILLIN AND CLAVULANATE POTASSIUM 1 TABLET: 875; 125 TABLET, FILM COATED ORAL at 09:18

## 2022-05-16 RX ADMIN — FUROSEMIDE 100 MG: 10 INJECTION, SOLUTION INTRAMUSCULAR; INTRAVENOUS at 09:19

## 2022-05-16 RX ADMIN — ACETAZOLAMIDE 500 MG: 500 INJECTION, POWDER, LYOPHILIZED, FOR SOLUTION INTRAVENOUS at 10:45

## 2022-05-16 RX ADMIN — LISINOPRIL 5 MG: 5 TABLET ORAL at 09:18

## 2022-05-16 RX ADMIN — MICONAZOLE NITRATE: 2 POWDER TOPICAL at 18:58

## 2022-05-16 RX ADMIN — ACETAZOLAMIDE 500 MG: 500 INJECTION, POWDER, LYOPHILIZED, FOR SOLUTION INTRAVENOUS at 18:56

## 2022-05-16 RX ADMIN — POTASSIUM CHLORIDE 40 MEQ: 20 TABLET, EXTENDED RELEASE ORAL at 13:41

## 2022-05-16 RX ADMIN — AMOXICILLIN AND CLAVULANATE POTASSIUM 1 TABLET: 875; 125 TABLET, FILM COATED ORAL at 21:43

## 2022-05-16 RX ADMIN — POLYMYXIN B SULFATE, BACITRACIN ZINC, NEOMYCIN SULFATE: 5000; 3.5; 4 OINTMENT TOPICAL at 19:08

## 2022-05-16 RX ADMIN — Medication 10 ML: at 21:44

## 2022-05-16 ASSESSMENT — ENCOUNTER SYMPTOMS
NAUSEA: 0
GASTROINTESTINAL NEGATIVE: 1
EYES NEGATIVE: 1
SHORTNESS OF BREATH: 1

## 2022-05-16 NOTE — PROGRESS NOTES
Nutrition Note    Heart Failure Diet Education:    Per hospital protocol or consult, patient being seen for Heart Failure diet guidelines. Patient's Lifestyle Questions:   Is HF a new Diagnosis? [x]Yes []No    Has patient had prior education? []Yes [x]No    Who does Grocery shopping and cooking? Patient    Frequency of eating out? 3 meals per week    Typical Eating Habits: Eating out, doesn't add salt anymore to foods, only eats ~2 meals per day. Instructed the Patient on:   [x] High/Low Sodium foods    [x] Moderation/portion control  [x] Eating out  [x] Fluid Restriction    [x] Label reading  [] Carb Counting   [] Other:      Educational Materials Provided:   [x] Nutrition Care Manual (NCM) Heart Failure Nutrition Therapy   [x] NCM Sodium (Salt) Content of Foods  [] NCM Sodium Free Flavoring Tips    [] Heart Healthy Eating Label Reading Tips   [] Healthy Eating when Eating Out  [] Controlling Your Fluids   [x] Fast Food Guide (from [employer])  [] NCM Carbohydrate Counting for People with Diabetes   [] Managing Your Diabetes Plate Method   [x] Other: Low Sodium Frozen meals    -Diet Recommendations: 2000 mg Sodium/day, 64 oz Fluids/day         Monitoring/Evaluation:   -Barriers: n/a  -Evaluation of education: Indicates understanding.  -Expected compliance: good. Additional Comments:   Pt reports she doesn't know what to eat when she goes home. Pt states she eats a lot of pork rinds and olives for snacks and only eats about 2 meals per day. Encouraged better options for snacks that were lower in sodium. Did discuss moderation and portion control of high sodium foods. Discussed nutrition label reading and choosing lower options while at the grocery store. Pt reports she is good with fluids at home. Pt verbalized understanding. Provided name and RD # to contact should questions arise.     Total time involved in patient education: 15 minutes        Electronically signed by Ellie PATRICK Bridges, CORA on 5/16/2022 at 11:43 AM

## 2022-05-16 NOTE — PROGRESS NOTES
Referring Physician: Dr. Cleatis Hashimoto  Reason for Consultation: \"Suspected CHF\"  Chief Complaint: Short of breath and swelling    Subjective:   History of Present Illness:  Jacob Boyer is a 55 y.o. patient who presented to the hospital with complaints of worsening lower extremity edema and shortness of breath. The patient did not routinely follow with a physician. She notes lower extremity edema for the past several years but did not seek medical attention. The swelling would wax and wane in intensity but would become so severe at times that she could not bend her knees or move her ankles. She would have skin breakdown at times on her lower extremities. However over the last few weeks, she has been having worsening shortness of breath. She states that she will sleep upright in bed or sitting at the edge of her bed secondary to PND/orthopnea. She says the real reason she came to the hospital was that she fell out of bed and her leg was trapped in an awkward position that she thought she may have injured her leg. [facility] was also consulted as she has a large fungating right breast mass. She first noticed 2 \"marble sized\" masses in her right breast approximately 10 years ago. For over 1 year, she says the mass on her chest will open up periodically and bleed but she has never sought medical attention. She was started on IV Lasix and notes improvement in the edema. She has generalized anasarca. Pulmonary was also consulted as the patient had hypercapnic respiratory failure requiring BiPAP there are no family members present bedside but per report the patient had been confused for several days prior to hospitalization. She endorses smoking. Interval history:  No acute overnight cardiac events. No family members are present bedside. She hopes to be discharged home soon. She feels that the edema has markedly improved over the weekend. She also feels the shortness of breath is improving.   She denies associated chest pains. Past Medical History:   has a past medical history of Breast cancer ([facility] [address] and CHF (congestive heart failure) ([facility] [address] Surgical History:  Denies prior cardiac surgery or coronary intervention. Social History:   reports that she has been smoking. She has a 64.50 pack-year smoking history. She has never used smokeless tobacco. She reports current alcohol use of about 1.0 - 3.0 standard drink of alcohol per week. Family History:  Denies premature coronary atherosclerosis.     Home Medications:  Were reviewed and are listed in nursing record and/or below  Prior to Admission medications    Not on File        CURRENT Medications:  acetaZOLAMIDE (DIAMOX) injection 500 mg, Q8H  furosemide (LASIX) 100 mg in dextrose 5 % 100 mL infusion, Continuous  magnesium sulfate 4000 mg in 100 mL IVPB premix, Once  potassium chloride (KLOR-CON M) extended release tablet 40 mEq, Q4H  [START ON 5/17/2022] potassium chloride (KLOR-CON M) extended release tablet 20 mEq, Daily with breakfast  amoxicillin-clavulanate (AUGMENTIN) 875-125 MG per tablet 1 tablet, 2 times per day  neomycin-bacitracin-polymyxin (NEOSPORIN) ointment, Daily  metoprolol succinate (TOPROL XL) extended release tablet 25 mg, Daily  naloxone (NARCAN) injection 2 mg, PRN  lidocaine-EPINEPHrine 1 %-1:[id number] injection 20 mL, Once  atorvastatin (LIPITOR) tablet 10 mg, Daily  acetaminophen (TYLENOL) tablet 650 mg, Q6H PRN   Or  acetaminophen (TYLENOL) suppository 650 mg, Q6H PRN  ondansetron (ZOFRAN) injection 4 mg, Q6H PRN  heparin (porcine) injection 5,000 Units, 3 times per day  lisinopril (PRINIVIL;ZESTRIL) tablet 5 mg, Daily  miconazole (MICOTIN) 2 % powder, BID  nicotine (NICODERM CQ) 21 MG/24HR 1 patch, Daily PRN  fluconazole (DIFLUCAN) tablet 200 mg, Daily  sodium chloride flush 0.9 % injection 5-40 mL, 2 times per day  sodium chloride flush 0.9 % injection 5-40 mL, PRN  0.9 % sodium chloride infusion, PRN  multivitamin 1 tablet, Daily  thiamine mononitrate tablet 100 mg, Daily  naloxone (NARCAN) injection 0.4 mg, PRN      Allergies:  Patient has no known allergies. Review of Systems:   · Constitutional: no unanticipated weight loss. There's been no change in energy level, sleep pattern, or activity level. No fevers, chills. · Eyes: No visual changes or diplopia. No scleral icterus. · ENT: No Headaches, hearing loss or vertigo. No mouth sores or sore throat. · Cardiovascular: as reviewed in HPI  · Respiratory: No cough or wheezing, no sputum production. No hemoptysis. · Gastrointestinal: No abdominal pain, appetite loss, blood in stools. No change in bowel or bladder habits. · Genitourinary: No dysuria, trouble voiding, or hematuria. · Musculoskeletal:  No gait disturbance, no joint complaints. · Integumentary: Right breast mass with redness. · Neurological: No headache, diplopia, change in muscle strength, numbness or tingling. · Psychiatric: No anxiety or depression. · Endocrine: No temperature intolerance. No excessive thirst, fluid intake, or urination. No tremor. · Hematologic/Lymphatic: No abnormal bruising or bleeding, blood clots or swollen lymph nodes. · Allergic/Immunologic: No nasal congestion or hives. Objective:   PHYSICAL EXAM:    Vitals:    05/16/22 1446   BP: (!) 118/59   Pulse: 69   Resp: 24   Temp: 97.4 °F (36.3 °C)   SpO2: 92%     Weight: 221 lb 8 oz (100.5 kg)       General Appearance:  Alert, cooperative, no respiratory distress, appears stated age. Wearing O2. Head:  Normocephalic, without obvious abnormality, atraumatic. Eyes:  Pupils equal and round. No scleral icterus. Mouth: Moist mucosa, no pharyngeal erythema. Nose: Nares normal. No drainage or sinus tenderness. Neck: Supple, symmetrical, trachea midline. No adenopathy. No tenderness/mass/nodules. No carotid bruit or elevated JVD. Lungs:   Respirations unlabored. Right basilar crackles.    Chest Wall:  No tenderness or deformity. Heart:  Regular rate. S1/S2 normal. No murmur, rub, or gallop. Abdomen:   Soft, non-tender, bowel sounds active. Musculoskeletal: No muscle wasting or digital clubbing. Extremities: Extremities normal, atraumatic. No cyanosis. 1+ BLE edema. Pulses: 2+ radial and carotid pulses, symmetric. Skin: Fungating right breast mass. BLE skin pruning as becoming less edematous. Pysch: Normal mood and affect. Alert and oriented x 4. Neurologic: Normal gross motor. Follows commands. Labs     CBC:   Lab Results   Component Value Date    WBC 4.2 05/16/2022    RBC 4.84 05/16/2022    HGB 11.0 05/16/2022    HCT 35.6 05/16/2022    MCV 73.6 05/16/2022    RDW 19.5 05/16/2022     05/16/2022     CMP:  Lab Results   Component Value Date     05/16/2022    K 3.4 05/16/2022    K 3.9 05/08/2022    CL 91 05/16/2022    CO2 42 05/16/2022    BUN 10 05/16/2022    CREATININE 0.6 05/16/2022    GFRAA >60 05/16/2022    GFRAA >60 01/22/2011    AGRATIO 1.0 05/08/2022    LABGLOM >60 05/16/2022    GLUCOSE 87 05/16/2022    PROT 6.9 05/08/2022    PROT 8.1 01/22/2011    CALCIUM 9.3 05/16/2022    BILITOT 1.1 05/08/2022    ALKPHOS 93 05/08/2022    AST 11 05/08/2022    ALT 6 05/08/2022     PT/INR:  No results found for: PTINR  HgBA1c:  Lab Results   Component Value Date    LABA1C 7.0 05/09/2022     Lab Results   Component Value Date    TROPONINI 0.01 05/08/2022       Cardiac Data     EKG: Personally interpreted. 5/8/2022. Sinus tachycardia. Low voltage QRS complex. Nonspecific T wave abnormality. Echo: 5/11/22. Left ventricular cavity size is normal.  Normal left ventricular wall thickness. Ejection fraction is visually estimated to be 55-60%. No regional wall motion abnormalities are noted. Indeterminate diastolic function. Measurement attempted while definity was administered.   right ventricular appears mildly enlarged with possible mildly reduced RV function  Unable to obtain doppler, M-Mode and RV TDI  Right ventricular septum flattened in systole and diastole consistent with RV pressure and volume overload. Mild pulmonic regurgitation present. Telemetry: Personally interpreted. Sinus. Assessment and Plan   1) Acute on chronic diastolic heart failure. EF 55-60%. NYHA III. Patient appears hypervolemic. Continue IV Lasix. Diamox started for contraction alkalosis. Continue ACE-I and Toprol-XL. Weight 263->221. 2) Acute on chronic hypoxic and hypercapnic respiratory failure. Pulmonary consulted. Diurese as tolerated. Wean O2 as tolerated. 3) Breast cancer. [facility] consulted. Patient now says she is agreeable to treating the cancer and having surgery. Would recommend continuing to defer surgery in the short-term until more compensated from her CHF. 4) CAD risk equivalent with type 2 diabetes mellitus/aortic atherosclerosis. Diabetes management per primary team.  Continue statin therapy. 5) Nicotine Addiction. Encouraged smoking cessation but patient is in the contemplative stage. 6) Obesity. BMI 35.7. Encourage weight loss. 7) Iron deficiency anemia. Ferritin only 17. Received IV iron but defer additional work-up/treatment to primary team.    Overall, the problems requiring hospitalization are high in severity. Thank you for allowing us to participate in the care of Yoel Agarwal. Marilyn Rinaldi.  Holly Braun, [address] [place] [address] Road  5/16/2022 1:36 PM

## 2022-05-16 NOTE — PROGRESS NOTES
Hospitalist Progress Note  5/16/2022 12:37 PM    PCP: No primary care provider on file. [phone]     Date of Admission: 5/8/2022                                                                                                                     HOSPITAL COURSE    Patient demographics:  The patient  Jean Paul Palencia is a 55 y.o. female     Significant past medical history:   Patient Active Problem List   Diagnosis    Malignancy ([facility] [address]    Leg swelling    Cellulitis    New onset of congestive heart failure ([facility] [address]    Acute on chronic diastolic heart failure ([facility] [address]    Type 2 diabetes mellitus without complication, without long-term current use of insulin (HCC)    Aortic atherosclerosis (HCC)    Nicotine dependence    Iron deficiency anemia    Acute on chronic systolic heart failure (HCC)    Acute respiratory failure with hypoxia (HCC)    Acute on chronic respiratory failure with hypercapnia (HCC)    Acute pulmonary edema (HCC)    Pleural effusion, right    Metabolic alkalosis         Presenting symptoms:  Leg swelling, shortness of breath, breast swelling and lesion    Diagnostic workup:      CONSULTS DURING ADMISSION :   IP CONSULT TO HEART FAILURE NURSE/COORDINATOR  IP CONSULT TO DIETITIAN  IP CONSULT TO INFECTIOUS DISEASES  IP CONSULT TO GENERAL SURGERY  IP CONSULT TO OB GYN  IP CONSULT TO ONCOLOGY  IP CONSULT TO CARDIOLOGY  IP CONSULT TO SOCIAL WORK  IP CONSULT TO PULMONOLOGY  IP CONSULT TO PALLIATIVE CARE  IP CONSULT TO [place]      Patient was diagnosed with:        Treatment while inpatient:                                                                                         ----------------------------------------------------------      SUBJECTIVE COMPLAINTS- still has some mild sob. Feels legs less swollen  Still on moderate oxygen    Diet: ADULT DIET; Regular; 4 carb choices (60 gm/meal); Low Sodium (2 gm); 1500 ml  ADULT ORAL NUTRITION SUPPLEMENT; Lunch;  Low Calorie/High Protein Oral Supplement      OBJECTIVE:   Patient Active Problem List   Diagnosis    Malignancy ([facility] [address]    Leg swelling    Cellulitis    New onset of congestive heart failure (HCC)    Acute on chronic diastolic heart failure (HCC)    Type 2 diabetes mellitus without complication, without long-term current use of insulin (HCC)    Aortic atherosclerosis (HCC)    Nicotine dependence    Iron deficiency anemia    Acute on chronic systolic heart failure (HCC)    Acute respiratory failure with hypoxia (HCC)    Acute on chronic respiratory failure with hypercapnia (HCC)    Acute pulmonary edema (HCC)    Pleural effusion, right    Metabolic alkalosis       Allergies  Patient has no known allergies.     Medications    Scheduled Meds:   acetaZOLAMIDE  500 mg IntraVENous Q8H    amoxicillin-clavulanate  1 tablet Oral 2 times per day    neomycin-bacitracin-polymyxin   Topical Daily    metoprolol succinate  25 mg Oral Daily    lidocaine-EPINEPHrine  20 mL IntraDERmal Once    atorvastatin  10 mg Oral Daily    heparin (porcine)  5,000 Units SubCUTAneous 3 times per day    lisinopril  5 mg Oral Daily    miconazole   Topical BID    fluconazole  200 mg Oral Daily    sodium chloride flush  5-40 mL IntraVENous 2 times per day    multivitamin  1 tablet Oral Daily    thiamine  100 mg Oral Daily     Continuous Infusions:   sodium chloride 25 mL/hr (05/12/22 2109)     PRN Meds:  naloxone, acetaminophen **OR** acetaminophen, ondansetron, nicotine, sodium chloride flush, sodium chloride, naloxone    Vitals   Vitals /wt   Patient Vitals for the past 8 hrs:   BP Temp Temp src Pulse Resp SpO2 Height Weight   05/16/22 1150 125/70 97 °F (36.1 °C) Oral 69 28 93 % -- --   05/16/22 1140 -- -- -- -- -- -- 5' 6\" (1.676 m) --   05/16/22 0908 120/64 97.4 °F (36.3 °C) Oral -- -- -- -- --   05/16/22 0543 -- -- -- -- -- -- -- 221 lb 8 oz (100.5 kg)   05/16/22 0457 (!) 117/56 97.4 °F (36.3 °C) Oral 79 29 92 % -- --        72HR INTAKE/OUTPUT:      Intake/Output Summary (Last 24 hours) at 5/16/2022 1237  Last data filed at 5/16/2022 0544  Gross per 24 hour   Intake --   Output 900 ml   Net -900 ml       Exam:    Gen:   Alert and oriented ×3  Eyes: PERRL. No sclera icterus. No conjunctival injection. ENT: No discharge. Pharynx clear. External appearance of ears and nose normal.  Neck: Trachea midline. No obvious mass. Resp: rales b/l not improved  CV: Regular rate. Regular rhythm. No murmur or rub. No edema. GI: Non-tender. Non-distended. No hernia. Skin: Warm, dry, normal texture and turgor. Lymph: No cervical LAD. No supraclavicular LAD. M/S: / Ext. No cyanosis. No clubbing. No joint deformity. Neuro: CN 2-12 are intact,  no neurologic deficits noted. PT/INR:   No results for input(s): PROTIME, INR in the last 72 hours. APTT:   No results for input(s): APTT in the last 72 hours. CBC:   Recent Labs     05/14/22  0609 05/15/22  0721 05/16/22  0837   WBC 5.2 4.1 4.2   HGB 10.9* 10.6* 11.0*   HCT 35.4* 34.3* 35.6*   MCV 73.7* 73.3* 73.6*    172 172       BMP:   Recent Labs     05/14/22  0609 05/15/22  0721 05/16/22  0837    141 142   K 3.5 3.5 3.4*   CL 93* 93* 91*   CO2 37* 41* 42*   BUN 12 11 10   CREATININE 0.8 0.7 0.6       LIVER PROFILE:   No results for input(s): ALKPHOS, AST, ALT, ALB, BILIDIR, BILITOT, ALKPHOS in the last 72 hours. No results for input(s): AMYLASE in the last 72 hours. No results for input(s): LIPASE in the last 72 hours. UA:  No results for input(s): NITRITE, LABCAST, WBCUA, RBCUA, MUCUS in the last 72 hours. TROPONIN:   No results for input(s): Jefry Hug in the last 72 hours. Lab Results   Component Value Date/Time    URRFLXCULT Not Indicated 05/08/2022 04:45 PM       No results for input(s): TSHREFLEX in the last 72 hours.     No components found for: [id number]  POC GLUCOSE:    Recent Labs     05/14/22  1653   POCGLU 121*     No results for input(s): LABA1C in the last 72 hours. Lab Results   Component Value Date    LABA1C 7.0 05/09/2022         ASSESSMENT AND PLAN  Acute on chronic systolic CHF  Still very congested on exam.  Will start lasix drip  Bun still low and Cr has not bumped up  Will d/w cardiology    Acute respiratory failure with hypoxia and hypercapnia  Likely due to CHF  Oxygen needs are decreasing with diuresis      Leg swelling  Likely related to CHF      Breast cellulitis  Continue with antibiotics  Cefepime metronidazole and fluconazole  Infectious diseases following    Breast cancer  Right breast fungating mass  Patient needs surgery and radiation treatment  PET scan as an outpatient  Hematology oncology is following        Pneumonia  Continue antibiotics  Patient started on vancomycin/cefepime/Flagyl and fluconazole, started on miconazole powder for breast cellulitis        Smoking abuse    Altered mental status      Tobacco abuse Z72.0  Nicotine patch and counseling      Consult to infectious disease  Consult to cardiology  Consult oncology  Consult to general surgery  Consult to OB/GYN  Pulmonary consult        Code Status: Full Code        Dispo -patient agreed to bone scan and wants to proceed with treatment        The patient and / or the family were informed of the results of any tests, a time was given to answer questions, a plan was proposed and they agreed with plan. Chuckie Jones MD    This note was transcribed using [id number]. Please disregard any translational errors.

## 2022-05-16 NOTE — TELEPHONE ENCOUNTER
Called left message on voice mail to schedule appointment with Dr. Dilip Singh at [facility], in the next 2 to 3 weeks. No reason given.

## 2022-05-16 NOTE — PROGRESS NOTES
PALLIATIVE MEDICINE PROGRESS NOTE     Patient name:Danielito Miles    KJK:[id number] :1965  Room/Bed:I9R-5711/5277-01    LOS: 8 days        ASSESSMENT/RECOMMENDATIONS     55 y.o. female with hypoxia and swelling.          Symptom Management:  1. Hypoxia: Patient currently on 4.5 liters of oxygen via a high flow nasal cannula. 2. Swelling: Patient on Lasix 100 mg scheduled BID. 3. Goals of Care: Met patient at her bedside today. Patient was again oriented x 4 and again appeared to be decisional today. Again reviewed patient's current health condition, goals and code status. Patient indicated today that she is hoping to be discharged from the hospital soon and would like to continue with all aggressive treatment at this time. Code status was again reviewed and the patient continues to be a Full Code.        Patient/Family Goals of Care :     - Met patient at her bedside today. Patient was again oriented x 4 and again appeared to be decisional today. Again reviewed patient's current health condition, goals and code status. Patient indicated today that she is hoping to be discharged from the hospital soon and would like to continue with all aggressive treatment at this time. Code status was again reviewed and the patient continues to be a Full Code.  - Met patient and her spouse Rosi Ortega at the bedside today. Patient continues to be oriented x 4. Reviewed patient's current course of care, health status, verified goals of care and verified code status. Patient indicated she would like to pursue all aggressive therapy at this time (including being resected by Dr. Gisell Jara if deemed to be medically necessary). The patient would like to remain a Full Code for her code status.  - Again met the patient at her bedside. Patient's spouse Rosi Ortega and mother Emily were present for the visit today. Patient was again oriented x 4 and appeared to be decisional today.   Again reviewed patient's current health condition, goals and code status. Patient indicated she would like to continue on with aggressive treatment at this time and hopes to be able to return home. Patient and her family would like to meet with [facility] post the patient's hospital stay to help assist with in home care. Patient and her family have POA paperwork and indicated they intended to fill out and complete POA paperwork today (patient again intends to name her mother Emily as her POA). Code status is to remain a Full Code. 5/9 - Met patient and her mother Emily at the bedside today. Patient was lethargic but oriented x 4. Patient did appear to be decisional today. Reviewed patient's current health status, goals of care and code status. Patient indicated she is not interested in hospice services at this time and would like to pursue all aggressive care at this time. Code status was reviewed and the patient would like to remain a Full Code.     Disposition/Discharge Plan:   An outpatient [facility] referral was ordered for post-discharge to followup with the patient once they return home.     Advance Directives:  · Surrogate Decision Maker: Emily, patient's mother, is patient's POA. · Code status:  Full Code     Case discussed with: patient    Thank you for allowing us to participate in the care of this patient. SUBJECTIVE     Chief Complaint: Hypoxia    Last 24 hours:   Patient oriented x 4 today. Patient indicated today that she is hoping to be discharged from the hospital soon and would like to continue with all aggressive treatment at this time. She continues to be a Full Code. ROS:    Review of Systems   Constitutional: Positive for fatigue. HENT: Negative. Eyes: Negative. Respiratory: Positive for shortness of breath. Cardiovascular: Positive for leg swelling. Gastrointestinal: Negative. Negative for nausea. Musculoskeletal: Negative. Skin: Positive for pallor.    Neurological: Positive for weakness. Psychiatric/Behavioral: Negative. Negative for agitation, behavioral problems and confusion. All other systems reviewed and are negative. A complete 10 count ROS was obtained. Pertinent positives mentioned above in HPI/ROS. All others if not mentioned are negative. OBJECTIVE   /70   Pulse 69   Temp 97 °F (36.1 °C) (Oral)   Resp 28   Ht 5' 6\" (1.676 m)   Wt 221 lb 8 oz (100.5 kg)   SpO2 93%   BMI 35.75 kg/m²   I/O last 3 completed shifts: In: 450 [P.O.:450]  Out: 2500 [Urine:2500]  No intake/output data recorded. Physical Exam  Vitals reviewed. Constitutional:       Appearance: She is ill-appearing. Interventions: Nasal cannula in place. HENT:      Head: Normocephalic. Nose: Nose normal.   Eyes:      Pupils: Pupils are equal, round, and reactive to light. Cardiovascular:      Rate and Rhythm: Normal rate. Pulses: Normal pulses. Pulmonary:      Comments: Crackles noted bilaterally today; tachypnea noted. Abdominal:      General: Bowel sounds are normal.      Palpations: Abdomen is soft. Musculoskeletal:      Cervical back: Neck supple. Right lower leg: Edema (again 1+) present. Left lower leg: Edema (again 1+) present. Skin:     General: Skin is warm and dry. Coloration: Skin is pale. Neurological:      Comments: Again oriented x 4 today. Psychiatric:         Mood and Affect: Mood normal.         Thought Content:  Thought content normal.         Judgment: Judgment normal.          Total time: 38 minutes  >50% of time spent counseling patient at bedside or POA/family member if applicable , reviewing information and discussing care, coordinating with care team       Signed By: Electronically signed by APRIL Raza CNP on 5/16/2022 at 12:22 PM   Palliative Medicine   [phone]    May 16, 2022

## 2022-05-16 NOTE — PROGRESS NOTES
I spoke to Dr. Sarah Lewis regarding Mrs. Williams Nicole.   He knows her well and saw her last week  Feels diuretic response has been actually fairly good with significant wt loss (prob more accurate than the I's/O's)  Feels we can transition to oral diuretics today  Will follow closely    Given active CHF would delay breast surgery for down the line      Electronically signed by Frieda Swain MD on 5/16/2022 at 1:56 PM

## 2022-05-16 NOTE — PROGRESS NOTES
Pulmonary Progress Note    CC:  Follow up respiratory failure    Subjective:  4.5 liters of oxygen  Negative 9 liters since admission   Says her breathings seem fine  Noticeably less leg swelling per her    ROS  Stable breathing      Intake/Output Summary (Last 24 hours) at 5/16/2022 0806  Last data filed at 5/16/2022 0544  Gross per 24 hour   Intake --   Output 900 ml   Net -900 ml         PHYSICAL EXAM:  Blood pressure (!) 117/56, pulse 79, temperature 97.4 °F (36.3 °C), temperature source Oral, resp. rate 29, height 5' 6\" (1.676 m), weight 221 lb 8 oz (100.5 kg), SpO2 92 %.'  Gen: Chronically ill appearing   Eyes: PERRL. No sclera icterus. No conjunctival injection. ENT: No discharge. Pharynx clear. External appearance of ears and nose normal.  Neck: Trachea midline. No obvious mass. Resp: Faint crackles in bases   CV: Regular rate. Regular rhythm. No murmur or rub. GI: Non-tender. Non-distended. No hernia. Skin: Pale  Lymph: No cervical LAD. No supraclavicular LAD. M/S: No cyanosis. No clubbing. No joint deformity. Neuro: Moves all four extremities. CN 2-12 tested, no defect noted.   Ext:   Decreased edema     Medications:    Scheduled Meds:   furosemide  100 mg IntraVENous BID    amoxicillin-clavulanate  1 tablet Oral 2 times per day    neomycin-bacitracin-polymyxin   Topical Daily    metoprolol succinate  25 mg Oral Daily    lidocaine-EPINEPHrine  20 mL IntraDERmal Once    atorvastatin  10 mg Oral Daily    heparin (porcine)  5,000 Units SubCUTAneous 3 times per day    lisinopril  5 mg Oral Daily    miconazole   Topical BID    fluconazole  200 mg Oral Daily    sodium chloride flush  5-40 mL IntraVENous 2 times per day    multivitamin  1 tablet Oral Daily    thiamine  100 mg Oral Daily       Continuous Infusions:   sodium chloride 25 mL/hr (05/12/22 2109)       PRN Meds:  naloxone, acetaminophen **OR** acetaminophen, ondansetron, nicotine, sodium chloride flush, sodium chloride, naloxone    Labs:  CBC:   Recent Labs     05/14/22  0609 05/15/22  0721   WBC 5.2 4.1   HGB 10.9* 10.6*   HCT 35.4* 34.3*   MCV 73.7* 73.3*    172     BMP:   Recent Labs     05/14/22  0609 05/15/22  0721    141   K 3.5 3.5   CL 93* 93*   CO2 37* 41*   BUN 12 11   CREATININE 0.8 0.7     LIVER PROFILE: No results for input(s): AST, ALT, LIPASE, BILIDIR, BILITOT, ALKPHOS in the last 72 hours. Invalid input(s): AMYLASE,  ALB  PT/INR: No results for input(s): PROTIME, INR in the last 72 hours. APTT: No results for input(s): APTT in the last 72 hours. UA:No results for input(s): NITRITE, COLORU, PHUR, LABCAST, WBCUA, RBCUA, MUCUS, TRICHOMONAS, YEAST, BACTERIA, CLARITYU, SPECGRAV, LEUKOCYTESUR, UROBILINOGEN, BILIRUBINUR, BLOODU, GLUCOSEU, AMORPHOUS in the last 72 hours. Invalid input(s): Ridge Swartz  No results for input(s): PH, PCO2, PO2 in the last 72 hours. Films:  Chest imaging reports were reviewed and imaging was reviewed by me and showed right pleural effusion > left, ground glass, atelectasis    ABG:  Nothing new    I reviewed the labs and images listed above    Assessment/Plan:    Acute Hypoxic Respiratory Failure with saturations less than 90% on room air  Titrate oxygen for saturations greater than or equal to 90%  o Will need oxygen at DC   Acute on Chronic Hypercapnic Respiratory Failure   Pulmonary edema   o Aggressive diuresis until she becomes pre-renal   Contraction alkalosis (new)  o Diamox for 3 doses. 500 mg q 8 hours  o Continue with lasix    Right Pleural Effusion (unclear if malignant)   Abnormal CT Chest:  Effusions, ground glass  o Continue with aggressive diuresis    Tobacco Abuse   o Cessation.          DVT prophylaxis  Heparin       Montserrat Segovia,   [facility]

## 2022-05-16 NOTE — PROGRESS NOTES
Physical Therapy  Facility/Department: [address] PROGRESSIVE CARE  Daily Treatment Note  NAME: Lemuel Mccartney  : 1965  MRN: [id number]    Date of Service: 2022    Discharge Recommendations:  Home with Home health PT,Patient would benefit from continued therapy after discharge   PT Equipment Recommendations  Equipment Needed: Yes  Mobility Devices: Sushma Brooking: Rolling    Patient Diagnosis(es): The primary encounter diagnosis was New onset of congestive heart failure ([address] Diagnoses of Breast mass, right, Cellulitis, unspecified cellulitis site, and Acute respiratory failure with hypoxia ([address] were also pertinent to this visit. Assessment   Assessment: Pt now on 4.5 L. O2, able to ambulate multiple trials in room with walker support, stable SPO2. Activity tolerance mildly limited by fatigue. She would benefit from continued therapy to maximize her balance and endurance. Anticipate safe return home with assist from family, a RW to maximize stability ambulating, and home PT level 1. Lemuel Mccartney scored a 18/24 on the AM-PAC short mobility form. If patient discharges prior to next session this note will serve as a discharge summary. Please see below for the latest assessment towards goals. Equipment Needed: Yes  Mobility Devices: [address]: 2-3 times per week  Specific Instructions for Next Treatment: Improve endurance; Ambulate  Current Treatment Recommendations: Strengthening;Balance training;Gait training;Functional mobility training;Transfer training; Endurance training;Home exercise program;Equipment evaluation, education, & procurement; Therapeutic activities; Safety education & training;Patient/Caregiver education & training     Restrictions  Restrictions/Precautions  Restrictions/Precautions: Fall Risk  Position Activity Restriction  Other position/activity restrictions: 10L O2     Subjective    Subjective  Subjective: Pt now on 4.5 L O2. Agreeable to activity. States she hasn't walked much. Objective      Balance  Sitting: Intact  Standing: With support  Transfer Training  Transfer Training: Yes  Overall Level of Assistance: Supervision  Sit to Stand: Supervision  Stand to Sit: Supervision    Gait Training  Gait Training: Yes  Gait  Overall Level of Assistance: Supervision  Interventions: Safety awareness training  Base of Support: Widened  Speed/Antoinette: Slow  Distance (ft):  (30', 60')     PT Exercises  Exercise Treatment: Mini-squat x 10, (B) heel raise 2 x 10, static stand, EC 3 x 10 s., unsupported march in place x 1 min.,     Other Specialty Interventions  Other Treatments/Modalities: Discussed D/C plan with pt. She is intent upon returning home. She is agreeable to using a RW, and to receiving home PT. Safety Devices  Type of Devices: All fall risk precautions in place;Call light within reach;Gait belt;Left in chair  Restraints  Restraints Initially in Place: No       Goals  Short Term Goals  Time Frame for Short term goals: 2-3 days  Short term goal 1: Bed mobility (I)  Short term goal 2: Transfers (I)  Short term goal 3: Ambulation 48' with LRAD, (I)  Short term goal 4: Ascend/Descend 4 steps with SBA  Patient Goals   Patient goals :  To return directly home    Therapy Time   Individual Concurrent Group Co-treatment   Time In 1300         Time Out 1325         Minutes 25         Timed Code Treatment Minutes: 25 Minutes       Adwoa Morgan PT    Electronically signed by Adwoa Morgan, PT [id number] on 5/16/2022 at 1:28 PM

## 2022-05-16 NOTE — PROGRESS NOTES
Nutrition Assessment     Type and Reason for Visit: Reassess    Nutrition Recommendations/Plan:   Continue Carb Control, Low Na diet with 1500 mL FR. Continue Ensure HP daily. Diet education completed. Malnutrition Assessment:  Malnutrition Status: No malnutrition    Nutrition Assessment:  Follow-up. Pt eating well since admission >50% at most meals. ONS on board, will continue. Provided diet education, see other RD note. Will continue current nutrition interventions at this time. Nutrition Related Findings:   +1 BLE and BUE edema; BM 5/15 Wound Type: Open Wounds (fungating mass)    Current Nutrition Therapies:    ADULT DIET; Regular; 4 carb choices (60 gm/meal); Low Sodium (2 gm); 1500 ml  ADULT ORAL NUTRITION SUPPLEMENT; Lunch;  Low Calorie/High Protein Oral Supplement    Anthropometric Measures:  · Height: 5' 6\" (167.6 cm)  · Current Body Wt: 221 lb (100.2 kg)   · BMI: 35.7    Nutrition Diagnosis:   · Increased nutrient needs related to increase demand for energy/nutrients as evidenced by wounds      Nutrition Interventions:   Food and/or Nutrient Delivery: Continue Current Diet,Continue Oral Nutrition Supplement  Nutrition Education/Counseling: Education completed  Coordination of Nutrition Care: Continue to monitor while inpatient       Discharge Planning:    No discharge needs at this time     Kenneth Saab, RD, LD  Contact: [phone]

## 2022-05-16 NOTE — PROGRESS NOTES
Occupational Therapy  Unable to see pt at this time due to working with PT at time of attempt. Will follow up with pt as time allows. Continue with POC.     Belkis Henriquez, OTR/L

## 2022-05-16 NOTE — CARE COORDINATION
Discharge Planning:    Per chart review, patient on 4-5L O2; will continue to follow for possible home oxygen needs at discharge. Will continue to follow for updated therapy recommendations as well.     Electronically signed by Afia Borrero RN on 5/16/2022 at 9:53 AM

## 2022-05-17 LAB
ANION GAP SERPL CALCULATED.3IONS-SCNC: 8 MMOL/L (ref 3–16)
BUN BLDV-MCNC: 10 MG/DL (ref 7–20)
CALCIUM SERPL-MCNC: 9.2 MG/DL (ref 8.3–10.6)
CHLORIDE BLD-SCNC: 94 MMOL/L (ref 99–110)
CO2: 39 MMOL/L (ref 21–32)
CREAT SERPL-MCNC: 0.7 MG/DL (ref 0.6–1.1)
GFR AFRICAN AMERICAN: >60
GFR NON-AFRICAN AMERICAN: >60
GLUCOSE BLD-MCNC: 102 MG/DL (ref 70–99)
MAGNESIUM: 2.2 MG/DL (ref 1.8–2.4)
POTASSIUM SERPL-SCNC: 3.4 MMOL/L (ref 3.5–5.1)
SODIUM BLD-SCNC: 141 MMOL/L (ref 136–145)

## 2022-05-17 PROCEDURE — 99232 SBSQ HOSP IP/OBS MODERATE 35: CPT | Performed by: INTERNAL MEDICINE

## 2022-05-17 PROCEDURE — 83735 ASSAY OF MAGNESIUM: CPT

## 2022-05-17 PROCEDURE — 97530 THERAPEUTIC ACTIVITIES: CPT

## 2022-05-17 PROCEDURE — 6370000000 HC RX 637 (ALT 250 FOR IP): Performed by: STUDENT IN AN ORGANIZED HEALTH CARE EDUCATION/TRAINING PROGRAM

## 2022-05-17 PROCEDURE — 2060000000 HC ICU INTERMEDIATE R&B

## 2022-05-17 PROCEDURE — 97110 THERAPEUTIC EXERCISES: CPT

## 2022-05-17 PROCEDURE — 80048 BASIC METABOLIC PNL TOTAL CA: CPT

## 2022-05-17 PROCEDURE — 6360000002 HC RX W HCPCS: Performed by: INTERNAL MEDICINE

## 2022-05-17 PROCEDURE — 2580000003 HC RX 258: Performed by: STUDENT IN AN ORGANIZED HEALTH CARE EDUCATION/TRAINING PROGRAM

## 2022-05-17 PROCEDURE — 36415 COLL VENOUS BLD VENIPUNCTURE: CPT

## 2022-05-17 PROCEDURE — 6370000000 HC RX 637 (ALT 250 FOR IP): Performed by: INTERNAL MEDICINE

## 2022-05-17 PROCEDURE — 6360000002 HC RX W HCPCS: Performed by: STUDENT IN AN ORGANIZED HEALTH CARE EDUCATION/TRAINING PROGRAM

## 2022-05-17 RX ORDER — LEVOFLOXACIN 500 MG/1
500 TABLET, FILM COATED ORAL DAILY
Status: DISCONTINUED | OUTPATIENT
Start: 2022-05-17 | End: 2022-05-18 | Stop reason: HOSPADM

## 2022-05-17 RX ADMIN — MICONAZOLE NITRATE: 2 POWDER TOPICAL at 21:42

## 2022-05-17 RX ADMIN — ACETAZOLAMIDE 500 MG: 500 INJECTION, POWDER, LYOPHILIZED, FOR SOLUTION INTRAVENOUS at 03:27

## 2022-05-17 RX ADMIN — THIAMINE HCL TAB 100 MG 100 MG: 100 TAB at 10:00

## 2022-05-17 RX ADMIN — LISINOPRIL 5 MG: 5 TABLET ORAL at 10:00

## 2022-05-17 RX ADMIN — FLUCONAZOLE 200 MG: 100 TABLET ORAL at 21:42

## 2022-05-17 RX ADMIN — Medication 10 ML: at 21:42

## 2022-05-17 RX ADMIN — ATORVASTATIN CALCIUM 10 MG: 10 TABLET, FILM COATED ORAL at 10:00

## 2022-05-17 RX ADMIN — POLYMYXIN B SULFATE, BACITRACIN ZINC, NEOMYCIN SULFATE: 5000; 3.5; 4 OINTMENT TOPICAL at 14:57

## 2022-05-17 RX ADMIN — POTASSIUM CHLORIDE 20 MEQ: 20 TABLET, EXTENDED RELEASE ORAL at 10:03

## 2022-05-17 RX ADMIN — MICONAZOLE NITRATE: 2 POWDER TOPICAL at 14:58

## 2022-05-17 RX ADMIN — HEPARIN SODIUM 5000 UNITS: 5000 INJECTION INTRAVENOUS; SUBCUTANEOUS at 14:56

## 2022-05-17 RX ADMIN — FUROSEMIDE 80 MG: 40 TABLET ORAL at 17:52

## 2022-05-17 RX ADMIN — HEPARIN SODIUM 5000 UNITS: 5000 INJECTION INTRAVENOUS; SUBCUTANEOUS at 21:42

## 2022-05-17 RX ADMIN — LEVOFLOXACIN 500 MG: 500 TABLET, FILM COATED ORAL at 14:56

## 2022-05-17 RX ADMIN — AMOXICILLIN AND CLAVULANATE POTASSIUM 1 TABLET: 875; 125 TABLET, FILM COATED ORAL at 10:00

## 2022-05-17 RX ADMIN — FUROSEMIDE 80 MG: 40 TABLET ORAL at 10:02

## 2022-05-17 RX ADMIN — Medication 10 ML: at 10:03

## 2022-05-17 RX ADMIN — HEPARIN SODIUM 5000 UNITS: 5000 INJECTION INTRAVENOUS; SUBCUTANEOUS at 06:34

## 2022-05-17 RX ADMIN — THERA TABS 1 TABLET: TAB at 10:00

## 2022-05-17 RX ADMIN — METOPROLOL SUCCINATE 25 MG: 25 TABLET, EXTENDED RELEASE ORAL at 10:00

## 2022-05-17 NOTE — PLAN OF CARE
Problem: Safety - Adult  Goal: Free from fall injury  Outcome: Progressing  Flowsheets (Taken 5/16/2022 2357)  Free From Fall Injury:   Instruct family/caregiver on patient safety   Based on caregiver fall risk screen, instruct family/caregiver to ask for assistance with transferring infant if caregiver noted to have fall risk factors  Note: Pt is a high fall risk. Fall risk bracelet placed. Fall risk sign and socks already in place. Pt has a walker at the bedside but has not been using it. She has a Hx of falling PTA and was noted to stumble when walking from the bed to the bathroom. RN had not previously had bed alarm on because pt was up ad margie t/o the day (per report) but talked to pt about calling staff before she gets up b/c of Hx of falls PTA and intermittent unsteadiness. In addition to that, pt has monitor wires and O2 tubing that could be trip hazards as well. Pt VU/DU. RN offered proactive toilet assistance. Bed in locked, low position with side rails up X 2 and an active bed alarm. Problem: Skin/Tissue Integrity  Goal: Absence of new skin breakdown  Description: 1. Monitor for areas of redness and/or skin breakdown. Note: Pt has multiple wounds to her RLE (skin tears, healing blisters). 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 The wound care for both was done during the day. The RLE is changed daily and the R breast is every other day. Pt's dressing are intact. Her R breast has a small amt of drainage noted from drsg exterior but is otherwise intact. RN encouraging pt to get OOB to the bathroom instead of using a purewick. Pt has been continent of urine so far in shift. Pt is able to reposition herself in bed.      Problem: Respiratory - Adult  Goal: Achieves optimal ventilation and oxygenation  Outcome: Progressing  Flowsheets (Taken 5/17/2022 0107)  Achieves optimal ventilation and oxygenation:   Assess for changes in respiratory status   Position to facilitate oxygenation and minimize respiratory effort   Oxygen supplementation based on oxygen saturation or arterial blood gases   Assess and instruct to report shortness of breath or any respiratory difficulty  Note: Pt continues to have crackles t/o all lung fields. She does not c/o feeling SOB with activity. Pt on 4.5 L NC to maintain O2 sat > 90%. Pt is on continuous pulse ox monitoring. Problem: Cardiovascular - Adult  Goal: Maintains optimal cardiac output and hemodynamic stability  Flowsheets (Taken 5/17/2022 0107)  Maintains optimal cardiac output and hemodynamic stability:   Monitor blood pressure and heart rate   Monitor urine output and notify Licensed Independent Practitioner for values outside of normal range  Note: Pt is on continuous cardiac monitoring. Hat in toilet for strict U.O. monitoring d/t diuresis. Pt states her BLE are significantly less swollen and she has lost a lot of water weight since admission. Standing weight completed. Problem: Metabolic/Fluid and Electrolytes - Adult  Goal: Electrolytes maintained within normal limits  Outcome: Progressing  Flowsheets (Taken 5/17/2022 0107)  Electrolytes maintained within normal limits:   Monitor labs and assess patient for signs and symptoms of electrolyte imbalances   Administer electrolyte replacement as ordered   Instruct patient on fluid and nutrition restrictions as appropriate  Note: Pt received magnesium and potassium replacement on day shift. No replacement protocols ordered. AM labs ordered for evaluation to response of intervention.

## 2022-05-17 NOTE — PROGRESS NOTES
Physical Therapy  Facility/Department: [address] PROGRESSIVE CARE  Daily Treatment Note  NAME: Briana Garvey  : 1965  MRN: [id number]    Date of Service: 2022    Discharge Recommendations:  Home with Home health PT,Patient would benefit from continued therapy after discharge   PT Equipment Recommendations  Equipment Needed: Yes  Mobility Devices: Williams Sumner: Rolling    Patient Diagnosis(es): The primary encounter diagnosis was New onset of congestive heart failure ([facility] Diagnoses of Breast mass, right, Cellulitis, unspecified cellulitis site, and Acute respiratory failure with hypoxia ([facility] [address] [facility] were also pertinent to this visit. Assessment   Assessment: Pt still on 4.5 L. at beginning of session, but tolerated mobility tasks in room with little difficulty. Dr. Chloé Cummings decreased pt's O2 to 3 L. during session, and she tolerated 3 min. static stand while maintaining SPO2 in 90s. She did desaturate briefly with standing marches x 30 s. on 3 L., and would benefit from continued therapy to improve her endurance. Anticipate return home with prn assist, use of RW to maximize stability ambulating household distances, and home PT level 1. Briana Garvey scored a 18/24 on the AM-PAC short mobility form. If patient discharges prior to next session this note will serve as a discharge summary. Please see below for the latest assessment towards goals. Equipment Needed: Yes     Plan    Plan  Plan: 2-3 times per week  Specific Instructions for Next Treatment: Improve endurance; Ambulate  Current Treatment Recommendations: Strengthening;Balance training;Gait training;Functional mobility training;Transfer training; Endurance training;Home exercise program;Equipment evaluation, education, & procurement; Therapeutic activities; Safety education & training;Patient/Caregiver education & training     Restrictions  Restrictions/Precautions  Restrictions/Precautions: Fall Risk  Position Activity Restriction  Other position/activity restrictions: 10L O2     Subjective    Subjective  Subjective: Pt moved from 4.5 to 3 L. during session (per Dr. Crawley List). Tolerated standing activity well. Objective      Transfer Training  Sit to Stand: Supervision  Stand to Sit: Supervision    Gait Training  Gait Training: Yes  Gait  Overall Level of Assistance: Supervision  Interventions: Safety awareness training  Speed/Antoinette: Accelerated  Distance (ft): 120 Feet     PT Exercises  Exercise Treatment: Static stand x 3 min. on 4.5  L. O2, SPO2 stable in 90s. Static stand x 3 min. on room air: SPO2 decreased to 83%, several minutes to rebound with seated rest.  Static stand x 3 min. on 3 L. O2, SPO2 stable. March in place x 30 s. on 3 L., SPO2 briefly to 88%, rebounded in standing. Other Specialty Interventions  Other Treatments/Modalities: Discussed D/C plan with pt. She is intent upon returning home. She is agreeable to using a RW, and to receiving home PT. Safety Devices  Type of Devices: All fall risk precautions in place;Call light within reach;Nurse notified; Left in bed  Restraints  Restraints Initially in Place: No     Goals  Short Term Goals  Time Frame for Short term goals: 2-3 days  Short term goal 1: Bed mobility (I)  Short term goal 2: Transfers (I)  Short term goal 3: Ambulation 48' with LRAD, (I)  Short term goal 4: Ascend/Descend 4 steps with SBA  Patient Goals   Patient goals :  To return directly home    Education       Therapy Time   Individual Concurrent Group Co-treatment   Time In 0905         Time Out 0930         Minutes 25         Timed Code Treatment Minutes: [facility]       Carlie Amos PT    Electronically signed by Carlie Amos PT [id number] on 5/17/2022 at 9:37 AM

## 2022-05-17 NOTE — PROGRESS NOTES
Hospitalist Progress Note      PCP: No primary care provider on file. Date of Admission: 5/8/2022    Chief Complaint: Leg swelling shortness of breath and breast lesion    Hospital Course:     Patient is a 42-year-old female admitted to the hospital with leg swelling shortness of breath and large breast lesion. Diagnosed with invasive breast cancer with superinfection. Also treated for hypercarbic hypoxic respiratory failure requiring BiPAP  Seen by pulmonary/infectious disease/cardiology/hematology oncology/breast surgery consultants    Treated for diastolic congestive heart failure along with IV antibiotics for infected breast mass    Patient underwent incisional biopsy of the right breast on 5/9/2022 by Dr. Nury Weiner    Received IV diuresis and pulmonary status improved. Eventually antibiotics were de-escalated to oral Augmentin and oral fluconazole  Once cardiac condition is stable patient will follow-up with breast surgery as an outpatient for surgical excision/right modified radical mastectomy    Oncology would like outpatient PET/CT to evaluate bilateral axillary lymphadenopathy and potential adjuvant radiation therapy and adjuvant chemo as well    Subjective: Doing well. Switched to oral diuretics yesterday. Continues to have excellent diuresis. She has been weaned down to 3 L. I explained to the patient that she is probably going to have to leave on some home oxygen in the range of 2 to 3 L.       Medications:  Reviewed    Infusion Medications    sodium chloride 25 mL/hr (05/12/22 2109)     Scheduled Medications    levoFLOXacin  500 mg Oral Daily    potassium chloride  20 mEq Oral Daily with breakfast    furosemide  80 mg Oral BID    neomycin-bacitracin-polymyxin   Topical Daily    metoprolol succinate  25 mg Oral Daily    lidocaine-EPINEPHrine  20 mL IntraDERmal Once    atorvastatin  10 mg Oral Daily    heparin (porcine)  5,000 Units SubCUTAneous 3 times per day    lisinopril  5 mg Oral Daily    miconazole   Topical BID    fluconazole  200 mg Oral Daily    sodium chloride flush  5-40 mL IntraVENous 2 times per day    multivitamin  1 tablet Oral Daily    thiamine  100 mg Oral Daily     PRN Meds: naloxone, acetaminophen **OR** acetaminophen, ondansetron, nicotine, sodium chloride flush, sodium chloride, naloxone      Intake/Output Summary (Last 24 hours) at 5/17/2022 1555  Last data filed at 5/17/2022 1310  Gross per 24 hour   Intake 900 ml   Output 1900 ml   Net -1000 ml       Physical Exam Performed:    /74   Pulse 63   Temp 98.6 °F (37 °C) (Oral)   Resp 22   Ht 5' 6\" (1.676 m)   Wt 213 lb 14.4 oz (97 kg)   SpO2 92%   BMI 34.52 kg/m²     General appearance: No apparent distress, appears stated age and cooperative. HEENT: Pupils equal, round, and reactive to light. Conjunctivae/corneas clear. Neck: Supple, with full range of motion. No jugular venous distention. Trachea midline. Respiratory: Diffuse inspiratory expiratory rales  Cardiovascular: Regular rate and rhythm with normal S1/S2 without murmurs, rubs or gallops. Abdomen: Soft, non-tender, non-distended with normal bowel sounds. Musculoskeletal: No clubbing, cyanosis or edema bilaterally. Full range of motion without deformity. Skin: Fungating ulcerated breast mass right breast.  Neurologic:  Neurovascularly intact without any focal sensory/motor deficits.  Cranial nerves: II-XII intact, grossly non-focal.  Psychiatric: Alert and oriented, thought content appropriate, normal insight  Capillary Refill: Brisk,3 seconds, normal   Peripheral Pulses: +2 palpable, equal bilaterally   Extremities: 2+ pitting edema decreased from 5/16/2022 exam    Labs:   Recent Labs     05/15/22  0721 05/16/22  0837   WBC 4.1 4.2   HGB 10.6* 11.0*   HCT 34.3* 35.6*    172     Recent Labs     05/15/22  0721 05/16/22  0837 05/17/22  0612    142 141   K 3.5 3.4* 3.4*   CL 93* 91* 94*   CO2 41* 42* 39*   BUN 11 10 10 CREATININE 0.7 0.6 0.7   CALCIUM 9.3 9.3 9.2     No results for input(s): AST, ALT, BILIDIR, BILITOT, ALKPHOS in the last 72 hours. No results for input(s): INR in the last 72 hours. No results for input(s): Jefry Hug in the last 72 hours. Urinalysis:      Lab Results   Component Value Date    NITRU Negative 05/08/2022    WBCUA 1 05/08/2022    BACTERIA None Seen 05/08/2022    RBCUA 1 05/08/2022    BLOODU Negative 05/08/2022    SPECGRAV 1.005 05/08/2022    GLUCOSEU Negative 05/08/2022       Radiology:  NM BONE SCAN WHOLE BODY   Final Result   No generalized scintigraphic pattern of osseous metastatic disease. Soft tissue anasarca. VL Extremity Venous Bilateral   Final Result      CT CHEST ABDOMEN PELVIS W CONTRAST   Final Result   1. CT CHEST: Mild congestive heart failure. 2. Nonspecific main pulmonary artery dilatation can be seen with pulmonary   hypertension. 3. Mild atelectasis bilateral lower lungs and small volume right pleural   effusion. Pneumonia is a consideration. 4. Large fungating right breast mass with diffuse right breast skin   thickening almost certainly primary breast cancer. 5. Bilateral axillary lymph node enlargement may be reactive or reflect   metastatic disease. 6. Anasarca. 7. CT ABDOMEN/PELVIS: No acute abnormality. 8. Nonspecific left adrenal 1.5 cm nodule. Follow-up noncontrast CT abdomen   no sooner than 48 hours from contrast administration recommended. 9. Nonspecific mild adenopathy right upper quadrant common with hepatic   steatosis, probably reactive. 10. Nonspecific splenomegaly. 11. Hepatic steatosis and hepatomegaly. 12. Anasarca. XR CHEST PORTABLE   Final Result   Pulmonary vascular congestion/interstitial edema. Cardiomegaly.                  Assessment/Plan:    Active Hospital Problems    Diagnosis     Metabolic alkalosis [T64.5]      Priority: Medium    Acute respiratory failure with hypoxia ([facility] [address] [J96.01] Priority: Medium    Acute on chronic respiratory failure with hypercapnia (HCC) [J96.22]      Priority: Medium    Acute pulmonary edema (HCC) [J81.0]      Priority: Medium    Pleural effusion, right [J90]      Priority: Medium    Acute on chronic systolic heart failure (HCC) [I50.23]      Priority: Medium    Iron deficiency anemia [D50.9]      Priority: Medium    New onset of congestive heart failure (HCC) [I50.9]      Priority: Medium    Acute on chronic diastolic heart failure (HCC) [I50.33]      Priority: Medium    Type 2 diabetes mellitus without complication, without long-term current use of insulin (HCC) [E11.9]      Priority: Medium    Aortic atherosclerosis (HCC) [I70.0]      Priority: Medium    Nicotine dependence [F17.200]      Priority: Medium    Malignancy (HCC) [C80.1]      Priority: Medium    Leg swelling [M79.89]      Priority: Medium    Cellulitis [L03.90]      Priority: Medium     Acute diastolic heart failure NYHA III-improving. Fantastic diuresis down from 263 to 213 pounds during this admission  Discharge likely tomorrow  Now on oral diuretics  Weaning oxygen  On ACE inhibitor and beta-blocker  Outpatient follow-up with cardiology    Invasive breast cancer-status post breast biopsy. Outpatient follow-up with heme-onc for PET/CT and also follow-up with breast surgery for radical mastectomy in the next several weeks once cardiac status has stabilized    Right breast cellulitis-improving on Levaquin and fluconazole    Acute on chronic hypoxic respiratory failure-much improved. Likely will need home oxygen at discharge    Iron deficiency anemia-stable    DVT Prophylaxis: Lovenox  Diet: ADULT DIET; Regular; 4 carb choices (60 gm/meal); Low Sodium (2 gm); 1500 ml  ADULT ORAL NUTRITION SUPPLEMENT; Lunch;  Low Calorie/High Protein Oral Supplement  Code Status: Full Code        Dispo -DC to home in the morning with home health care    Yamileth Thrasher MD

## 2022-05-17 NOTE — PROGRESS NOTES
Pulmonary Progress Note    CC:  Follow up respiratory failure    Subjective:  Lasix increased yesterday  Diamox give as well  4.5 liters of oxygen  Still frustrated with still being on oxygen  Taken to room air but was saturating 83%         Intake/Output Summary (Last 24 hours) at 5/17/2022 0815  Last data filed at 5/17/2022 0645  Gross per 24 hour   Intake 830 ml   Output 3250 ml   Net -2420 ml         PHYSICAL EXAM:  Blood pressure 136/76, pulse 70, temperature 98.6 °F (37 °C), temperature source Oral, resp. rate 23, height 5' 6\" (1.676 m), weight 213 lb 14.4 oz (97 kg), SpO2 96 %.'  Gen: Chronically ill appearing   Eyes: PERRL. No sclera icterus. No conjunctival injection. ENT: No discharge. Pharynx clear. External appearance of ears and nose normal.  Neck: Trachea midline. No obvious mass. Resp: Faint crackles   CV: Regular rate. Regular rhythm. No murmur or rub. GI: Non-tender. Non-distended. No hernia. Skin: Pale  Lymph: No cervical LAD. No supraclavicular LAD. M/S: No cyanosis. No clubbing. No joint deformity. Neuro: Moves all four extremities. CN 2-12 tested, no defect noted.   Ext:   Decreased edema but still present     Medications:    Scheduled Meds:   potassium chloride  20 mEq Oral Daily with breakfast    furosemide  80 mg Oral BID    amoxicillin-clavulanate  1 tablet Oral 2 times per day    neomycin-bacitracin-polymyxin   Topical Daily    metoprolol succinate  25 mg Oral Daily    lidocaine-EPINEPHrine  20 mL IntraDERmal Once    atorvastatin  10 mg Oral Daily    heparin (porcine)  5,000 Units SubCUTAneous 3 times per day    lisinopril  5 mg Oral Daily    miconazole   Topical BID    fluconazole  200 mg Oral Daily    sodium chloride flush  5-40 mL IntraVENous 2 times per day    multivitamin  1 tablet Oral Daily    thiamine  100 mg Oral Daily       Continuous Infusions:   sodium chloride 25 mL/hr (05/12/22 2109)       PRN Meds:  naloxone, acetaminophen **OR** acetaminophen, ondansetron, nicotine, sodium chloride flush, sodium chloride, naloxone    Labs:  CBC:   Recent Labs     05/15/22  0721 05/16/22  0837   WBC 4.1 4.2   HGB 10.6* 11.0*   HCT 34.3* 35.6*   MCV 73.3* 73.6*    172     BMP:   Recent Labs     05/15/22  0721 05/16/22  0837 05/17/22  0612    142 141   K 3.5 3.4* 3.4*   CL 93* 91* 94*   CO2 41* 42* 39*   BUN 11 10 10   CREATININE 0.7 0.6 0.7     LIVER PROFILE: No results for input(s): AST, ALT, LIPASE, BILIDIR, BILITOT, ALKPHOS in the last 72 hours. Invalid input(s): AMYLASE,  ALB  PT/INR: No results for input(s): PROTIME, INR in the last 72 hours. APTT: No results for input(s): APTT in the last 72 hours. UA:No results for input(s): NITRITE, COLORU, PHUR, LABCAST, WBCUA, RBCUA, MUCUS, TRICHOMONAS, YEAST, BACTERIA, CLARITYU, SPECGRAV, LEUKOCYTESUR, UROBILINOGEN, BILIRUBINUR, BLOODU, GLUCOSEU, AMORPHOUS in the last 72 hours. Invalid input(s): Elfrieda Gauze  No results for input(s): PH, PCO2, PO2 in the last 72 hours. Films:  Chest imaging reports were reviewed and imaging was reviewed by me and showed right pleural effusion > left, ground glass, atelectasis    ABG:  Nothing new    I reviewed the labs and images listed above    Assessment/Plan:    Acute Hypoxic Respiratory Failure with saturations less than 90% on room air  Titrate oxygen for saturations greater than or equal to 90%  o Will need oxygen at DC. Ok to DC home on oxygen    Acute on Chronic Hypercapnic Respiratory Failure   Pulmonary edema   o Aggressive diuresis until she becomes pre-renal   Contraction alkalosis  o S/p Diamox for 3 doses. o Continue with lasix    Right Pleural Effusion (unclear if malignant)   Abnormal CT Chest:  Effusions, ground glass  o Continue with aggressive diuresis    Tobacco Abuse   o Cessation.          DVT prophylaxis  Heparin       DO Kathryn Rashid Pulmonary

## 2022-05-17 NOTE — PROGRESS NOTES
Occupational Therapy  Facility/Department: [address] PROGRESSIVE CARE  Occupational Therapy Treatment and Tentative D/C      Name: George Cali  : 1965  MRN: [id number]  Date of Service: 2022    Discharge Recommendations: George Cali scored a 24/24 on the AM-PAC ADL Inpatient form. Current research shows that an AM-PAC score of 18 or greater is typically associated with a discharge to the patient's home setting. If patient discharges prior to next session this note will serve as a discharge summary. Please see below for the latest assessment towards goals. Home with assist PRN  OT Equipment Recommendations  Equipment Needed: Yes  ADL Assistive Devices: Shower Chair with back       Patient Diagnosis(es): The primary encounter diagnosis was New onset of congestive heart failure ([facility] [address] Diagnoses of Breast mass, right, Cellulitis, unspecified cellulitis site, and Acute respiratory failure with hypoxia ([address] were also pertinent to this visit. Past Medical History:  has a past medical history of Breast cancer ([facility] [address] and CHF (congestive heart failure) ([facility] [address] Past Surgical History:  has no past surgical history on file. Assessment   Assessment: Pt tolerated session well. Pt completes functional mobility and transfers with supervision/Ind. Pt on 3L O2 throughout with SpO2 >90%. Pt completes toileting Ind and grooming in stance at sink Ind. Pt with no ongoing OT needs at this time. Pt will benefit from possible shower chair pending improved O2 and general endurance. D/C from OT.   Prognosis: Good  REQUIRES OT FOLLOW-UP: No  Activity Tolerance  Activity Tolerance: Patient Tolerated treatment well        Plan   Plan  Times per Week: D/C from OT  Current Treatment Recommendations: Functional mobility training,Balance training,Strengthening,Endurance training,Patient/Caregiver education & training,Safety education & training,Self-Care / ADL,Equipment evaluation, education, & procurement Restrictions  Restrictions/Precautions  Restrictions/Precautions: Fall Risk  Position Activity Restriction  Other position/activity restrictions: 3L O2    Subjective   General  Chart Reviewed: Yes  Patient assessed for rehabilitation services?: Yes  Additional Pertinent Hx: per ED note, John Hernandez is a 55 y.o. female who presents to the emergency department by private vehicle complaining of lower extremity swelling. Patient states for the past year she has had intermittent bilateral lower extremity swelling. States swelling has been worse over the past couple weeks. Patient states \"I ate a lot of salt recently\", believes this is the cause. She also complaining of wound on her right shin. She cut herself shaving about a week ago. Wound is still open and she has developed blisters around region. Over the past couple days she has developed shortness of breath. States she becomes easily winded when overexcited, talking too long or exerting herself. Patient sleeps elevated on pillows, denies any increase in elevation. She has had a mild occasional cough, denies hemoptysis or production. Denies any chest pain, lightheadedness, fevers, chills, palpitations. Family / Caregiver Present: No  Referring Practitioner: Frank Lee MD  Subjective  Subjective: Pt agreeable to OT treatment. Pt on 3L O2 throughout with SpO2 >90%. Pt reports up ad margie in room to bathroom. Social/Functional History  Social/Functional History  Lives With: Spouse  Type of Home: House  Home Layout: Two level,Able to Live on Main level with bedroom/bathroom  Home Access: Stairs to enter without rails  Entrance Stairs - Number of Steps: 3-4  Bathroom Shower/Tub: Tub/Shower unit  Bathroom Toilet: Standard  Home Equipment:  (No DME)  ADL Assistance: Independent  Ambulation Assistance: Independent  Transfer Assistance: Independent  Active :  Yes  Additional Comments: No Falls       Objective   Pulse: 70  Heart Rate Source: Monitor  BP: 136/76  BP Location: Left lower arm  Patient Position: Turns self  MAP (Calculated): 96  Resp: 23  SpO2: 96 %  O2 Device: Nasal cannula  Vision Exceptions: Wears glasses at all times  Hearing: Within functional limits          Safety Devices  Type of Devices: Call light within reach;Nurse notified; Left in chair  Restraints  Restraints Initially in Place: No  Bed Mobility Training  Bed Mobility Training: Yes  Overall Level of Assistance: Independent  Supine to Sit: Independent  Scooting: Independent  Balance  Sitting: Intact  Standing: Intact  Standing - Static: Good  Standing - Dynamic: Fair;Good  Transfer Training  Transfer Training: Yes  Overall Level of Assistance: Independent  Sit to Stand: Independent (complete x10 reps from chair with no LOB)  Stand to Sit: Independent  Bed to Chair: Independent  Toilet Transfer: Independent  Gait Training  Gait Training: Yes  Gait  Overall Level of Assistance: Supervision (no LOB; SpO2 >90% throughout)  Interventions: Safety awareness training  Speed/Antoinette: Accelerated  Distance (ft): 25 Feet (25ft x2; x20 marches in place x2 reps)     AROM: Within functional limits  PROM: Within functional limits  Strength:  Within functional limits  Coordination: Within functional limits  Tone: Normal  Sensation: Intact  ADL  Grooming: Independent (in stance at sink to wash hands)  Toileting: Independent (able to complete pericare and manage pants)                 Cognition  Overall Cognitive Status: WFL                  Education Given To: Patient  Education Provided: Role of Therapy;Plan of Care;Transfer Training;ADL Adaptive Strategies  Barriers to Learning: None          AM-PAC Score        AM-PAC Inpatient Daily Activity Raw Score: 24 (05/17/22 1128)  AM-PAC Inpatient ADL T-Scale Score : 57.54 (05/17/22 1128)  ADL Inpatient CMS 0-100% Score: 0 (05/17/22 1128)  ADL Inpatient CMS G-Code Modifier : [address] (05/17/22 1128)    Goals  Short Term Goals  Time Frame for Short term goals: prior to D/C: pt with no ongoing OT needs  Short Term Goal 1: complete functional transfers Ind  Short Term Goal 2: complete bathing and dressing Ind  Short Term Goal 3: complete toileting Ind  Short Term Goal 4: complete grooming in stance at sink Ind  Long Term Goals  Time Frame for Long term goals : STG=LTG  Patient Goals   Patient goals : return home       Therapy Time   Individual Concurrent Group Co-treatment   Time In 1102         Time Out 1125         Minutes 23         Timed Code Treatment Minutes: 23 Minutes       ANGELINA Coulter/L

## 2022-05-17 NOTE — CARE COORDINATION
Discharge Planning:    Spoke with patient at bedside re: discharge plan. Patient to return home with family and home health care for PT. Therapy recommended a shower chair with back and rolling walker for at home use. DME orders pursued via physician. Patient provided a list of home care companies, but has no preference at this time. The Plan for Transition of Care is related to the following treatment goals: home with home health PT    The Patient was provided with a choice of provider and agrees   with the discharge plan. [x] Yes [] No    Freedom of choice list was provided with basic dialogue that supports the patient's individualized plan of care/goals, treatment preferences and shares the quality data associated with the providers. [x] Yes [] No    [facility]  [facility] sent    Will continue to follow for possible home oxygen needs as well. Electronically signed by David Pemberton RN on 5/17/2022 at 10:49 AM     Addendum:    [facility] [place] referral sent; can accept patient. Called [employer] to ensure they have received her DME orders.     Electronically signed by David Pemberton RN on 5/17/2022 at 1:02 PM

## 2022-05-17 NOTE — PROGRESS NOTES
Referring Physician: Dr. Alfonzo Gustafson  Reason for Consultation: \"Suspected CHF\"  Chief Complaint: Short of breath and swelling    Subjective:   History of Present Illness:  Brad Anguiano is a 55 y.o. patient who presented to the hospital with complaints of worsening lower extremity edema and shortness of breath. The patient did not routinely follow with a physician. She notes lower extremity edema for the past several years but did not seek medical attention. The swelling would wax and wane in intensity but would become so severe at times that she could not bend her knees or move her ankles. She would have skin breakdown at times on her lower extremities. However over the last few weeks, she has been having worsening shortness of breath. She states that she will sleep upright in bed or sitting at the edge of her bed secondary to PND/orthopnea. She says the real reason she came to the hospital was that she fell out of bed and her leg was trapped in an awkward position that she thought she may have injured her leg. [facility]HC was also consulted as she has a large fungating right breast mass. She first noticed 2 \"marble sized\" masses in her right breast approximately 10 years ago. For over 1 year, she says the mass on her chest will open up periodically and bleed but she has never sought medical attention. She was started on IV Lasix and notes improvement in the edema. She has generalized anasarca. Pulmonary was also consulted as the patient had hypercapnic respiratory failure requiring BiPAP there are no family members present bedside but per report the patient had been confused for several days prior to hospitalization. She endorses smoking. Interval history:  No acute overnight cardiac events. Several family members are present bedside. She hopes to be discharged home soon. She feels that the edema has markedly improved over the past few days.   She also feels the shortness of breath is improving but continues to require supplemental oxygen. She denies associated chest pains. Past Medical History:   has a past medical history of Breast cancer ([facility] [address] and CHF (congestive heart failure) ([facility] [address] Surgical History:  Denies prior cardiac surgery or coronary intervention. Social History:   reports that she has been smoking. She has a 64.50 pack-year smoking history. She has never used smokeless tobacco. She reports current alcohol use of about 1.0 - 3.0 standard drink of alcohol per week. Family History:  Denies premature coronary atherosclerosis. Home Medications:  Were reviewed and are listed in nursing record and/or below  Prior to Admission medications    Not on File        CURRENT Medications:  potassium chloride (KLOR-CON M) extended release tablet 20 mEq, Daily with breakfast  furosemide (LASIX) tablet 80 mg, BID  amoxicillin-clavulanate (AUGMENTIN) 875-125 MG per tablet 1 tablet, 2 times per day  neomycin-bacitracin-polymyxin (NEOSPORIN) ointment, Daily  metoprolol succinate (TOPROL XL) extended release tablet 25 mg, Daily  naloxone (NARCAN) injection 2 mg, PRN  lidocaine-EPINEPHrine 1 %-1:015909 injection 20 mL, Once  atorvastatin (LIPITOR) tablet 10 mg, Daily  acetaminophen (TYLENOL) tablet 650 mg, Q6H PRN   Or  acetaminophen (TYLENOL) suppository 650 mg, Q6H PRN  ondansetron (ZOFRAN) injection 4 mg, Q6H PRN  heparin (porcine) injection 5,000 Units, 3 times per day  lisinopril (PRINIVIL;ZESTRIL) tablet 5 mg, Daily  miconazole (MICOTIN) 2 % powder, BID  nicotine (NICODERM CQ) 21 MG/24HR 1 patch, Daily PRN  fluconazole (DIFLUCAN) tablet 200 mg, Daily  sodium chloride flush 0.9 % injection 5-40 mL, 2 times per day  sodium chloride flush 0.9 % injection 5-40 mL, PRN  0.9 % sodium chloride infusion, PRN  multivitamin 1 tablet, Daily  thiamine mononitrate tablet 100 mg, Daily  naloxone (NARCAN) injection 0.4 mg, PRN      Allergies:  Patient has no known allergies.      Review of Systems: · Constitutional: no unanticipated weight loss. There's been no change in energy level, sleep pattern, or activity level. No fevers, chills. · Eyes: No visual changes or diplopia. No scleral icterus. · ENT: No Headaches, hearing loss or vertigo. No mouth sores or sore throat. · Cardiovascular: as reviewed in HPI  · Respiratory: No cough or wheezing, no sputum production. No hemoptysis. · Gastrointestinal: No abdominal pain, appetite loss, blood in stools. No change in bowel or bladder habits. · Genitourinary: No dysuria, trouble voiding, or hematuria. · Musculoskeletal:  No gait disturbance, no joint complaints. · Integumentary: Right breast mass with redness. · Neurological: No headache, diplopia, change in muscle strength, numbness or tingling. · Psychiatric: No anxiety or depression. · Endocrine: No temperature intolerance. No excessive thirst, fluid intake, or urination. No tremor. · Hematologic/Lymphatic: No abnormal bruising or bleeding, blood clots or swollen lymph nodes. · Allergic/Immunologic: No nasal congestion or hives. Objective:   PHYSICAL EXAM:    Vitals:    05/17/22 1145   BP: 122/74   Pulse: 63   Resp: 22   Temp:    SpO2: 92%     Weight: 213 lb 14.4 oz (97 kg)       General Appearance:  Alert, cooperative, no respiratory distress, appears stated age. Wearing O2. Head:  Normocephalic, without obvious abnormality, atraumatic. Eyes:  Pupils equal and round. No scleral icterus. Mouth: Moist mucosa, no pharyngeal erythema. Nose: Nares normal. No drainage or sinus tenderness. Neck: Supple, symmetrical, trachea midline. No adenopathy. No tenderness/mass/nodules. No carotid bruit or elevated JVD. Lungs:   Respirations unlabored. Right basilar crackles. Chest Wall:  No tenderness or deformity. Heart:  Regular rate. S1/S2 normal. No murmur, rub, or gallop. Abdomen:   Soft, non-tender, bowel sounds active. Musculoskeletal: No muscle wasting or digital clubbing. Extremities: Extremities normal, atraumatic. No cyanosis. 1+ BLE edema. Pulses: 2+ radial and carotid pulses, symmetric. Skin: Fungating right breast mass. BLE skin pruning as becoming less edematous. Pysch: Normal mood and affect. Alert and oriented x 4. Neurologic: Normal gross motor. Follows commands. Labs     CBC:   Lab Results   Component Value Date    WBC 4.2 05/16/2022    RBC 4.84 05/16/2022    HGB 11.0 05/16/2022    HCT 35.6 05/16/2022    MCV 73.6 05/16/2022    RDW 19.5 05/16/2022     05/16/2022     CMP:  Lab Results   Component Value Date     05/17/2022    K 3.4 05/17/2022    K 3.9 05/08/2022    CL 94 05/17/2022    CO2 39 05/17/2022    BUN 10 05/17/2022    CREATININE 0.7 05/17/2022    GFRAA >60 05/17/2022    GFRAA >60 01/22/2011    AGRATIO 1.0 05/08/2022    LABGLOM >60 05/17/2022    GLUCOSE 102 05/17/2022    PROT 6.9 05/08/2022    PROT 8.1 01/22/2011    CALCIUM 9.2 05/17/2022    BILITOT 1.1 05/08/2022    ALKPHOS 93 05/08/2022    AST 11 05/08/2022    ALT 6 05/08/2022     PT/INR:  No results found for: PTINR  HgBA1c:  Lab Results   Component Value Date    LABA1C 7.0 05/09/2022     Lab Results   Component Value Date    TROPONINI 0.01 05/08/2022       Cardiac Data     EKG: Personally interpreted. 5/8/2022. Sinus tachycardia. Low voltage QRS complex. Nonspecific T wave abnormality. Echo: 5/11/22. Left ventricular cavity size is normal.  Normal left ventricular wall thickness. Ejection fraction is visually estimated to be 55-60%. No regional wall motion abnormalities are noted. Indeterminate diastolic function. Measurement attempted while definity was administered. right ventricular appears mildly enlarged with possible mildly reduced RV function  Unable to obtain doppler, M-Mode and RV TDI  Right ventricular septum flattened in systole and diastole consistent with RV pressure and volume overload. Mild pulmonic regurgitation present.     Telemetry: Personally interpreted. Sinus. Assessment and Plan   1) Acute on chronic diastolic heart failure. EF 55-60%. NYHA III. Patient appears hypervolemic. Transitioned to oral Lasix. Continue ACE-I and Toprol-XL. Weight 263->213. 2) Acute on chronic hypoxic and hypercapnic respiratory failure. Pulmonary consulted. Diurese as tolerated. Wean O2 as tolerated. 3) Breast cancer. [facility]HC consulted. Patient now says she is agreeable to treating the cancer and having surgery. Would recommend continuing to defer surgery in the short-term until more compensated from her CHF. 4) CAD risk equivalent with type 2 diabetes mellitus/aortic atherosclerosis. Diabetes management per primary team.  Continue statin therapy. 5) Nicotine Addiction. Encouraged smoking cessation but patient is in the contemplative stage. 6) Obesity. BMI 35.7. Encourage weight loss. 7) Iron deficiency anemia. Ferritin only 17. Received IV iron but defer additional work-up/treatment to primary team.    Overall, the problems requiring hospitalization are high in severity. If no acute overnight events, patient can be discharged tomorrow from a cardiovascular perspective. Will sign off. Call with questions. Will arrange outpatient follow-up. Thank you for allowing us to participate in the care of Brandie Miller. Christine Aquino.  Winston Avery, [address] [place] [address] Road  5/17/2022 12:00 PM

## 2022-05-17 NOTE — PLAN OF CARE
Continuing to work with patient and health care team on discharge plan. Discharge instructions and medication management will be reviewed prior to discharge. Safe environment was maintained for patient during this shift. Bed in lowest position with wheels locked. Call light in reach of patient and appropriate ID bands in place. Skin assessment performed each shift per protocol. Patient turned and repositioned every two hours and prn with pillow support. Patient checked for incontence every two hours. Pt able to express presence/absence of pain and rate pain appropriately using numerical scale. Pain/discomfort being managed with PRN analgesics per MD orders (see MAR). Pain assessed every shift and after interventions.

## 2022-05-18 ENCOUNTER — TELEPHONE (OUTPATIENT)
Dept: PULMONOLOGY | Age: 57
End: 2022-05-18

## 2022-05-18 VITALS
RESPIRATION RATE: 23 BRPM | HEIGHT: 66 IN | BODY MASS INDEX: 33.73 KG/M2 | TEMPERATURE: 98.2 F | HEART RATE: 66 BPM | WEIGHT: 209.9 LBS | SYSTOLIC BLOOD PRESSURE: 134 MMHG | DIASTOLIC BLOOD PRESSURE: 75 MMHG | OXYGEN SATURATION: 97 %

## 2022-05-18 LAB
ANION GAP SERPL CALCULATED.3IONS-SCNC: 7 MMOL/L (ref 3–16)
ANION GAP SERPL CALCULATED.3IONS-SCNC: 9 MMOL/L (ref 3–16)
BASOPHILS ABSOLUTE: 0.1 K/UL (ref 0–0.2)
BASOPHILS RELATIVE PERCENT: 1.2 %
BUN BLDV-MCNC: 11 MG/DL (ref 7–20)
BUN BLDV-MCNC: 12 MG/DL (ref 7–20)
CALCIUM SERPL-MCNC: 9.4 MG/DL (ref 8.3–10.6)
CALCIUM SERPL-MCNC: 9.6 MG/DL (ref 8.3–10.6)
CHLORIDE BLD-SCNC: 95 MMOL/L (ref 99–110)
CHLORIDE BLD-SCNC: 97 MMOL/L (ref 99–110)
CO2: 34 MMOL/L (ref 21–32)
CO2: 36 MMOL/L (ref 21–32)
CREAT SERPL-MCNC: 0.7 MG/DL (ref 0.6–1.1)
CREAT SERPL-MCNC: 0.8 MG/DL (ref 0.6–1.1)
EOSINOPHILS ABSOLUTE: 0.1 K/UL (ref 0–0.6)
EOSINOPHILS RELATIVE PERCENT: 2.7 %
GFR AFRICAN AMERICAN: >60
GFR AFRICAN AMERICAN: >60
GFR NON-AFRICAN AMERICAN: >60
GFR NON-AFRICAN AMERICAN: >60
GLUCOSE BLD-MCNC: 136 MG/DL (ref 70–99)
GLUCOSE BLD-MCNC: 93 MG/DL (ref 70–99)
HCT VFR BLD CALC: 35.6 % (ref 36–48)
HEMOGLOBIN: 11 G/DL (ref 12–16)
LYMPHOCYTES ABSOLUTE: 1 K/UL (ref 1–5.1)
LYMPHOCYTES RELATIVE PERCENT: 19.6 %
MAGNESIUM: 1.9 MG/DL (ref 1.8–2.4)
MCH RBC QN AUTO: 22.9 PG (ref 26–34)
MCHC RBC AUTO-ENTMCNC: 30.9 G/DL (ref 31–36)
MCV RBC AUTO: 73.9 FL (ref 80–100)
MONOCYTES ABSOLUTE: 0.5 K/UL (ref 0–1.3)
MONOCYTES RELATIVE PERCENT: 9.5 %
NEUTROPHILS ABSOLUTE: 3.4 K/UL (ref 1.7–7.7)
NEUTROPHILS RELATIVE PERCENT: 67 %
PDW BLD-RTO: 20.3 % (ref 12.4–15.4)
PLATELET # BLD: 189 K/UL (ref 135–450)
PMV BLD AUTO: 8.2 FL (ref 5–10.5)
POTASSIUM SERPL-SCNC: 3.6 MMOL/L (ref 3.5–5.1)
POTASSIUM SERPL-SCNC: 3.7 MMOL/L (ref 3.5–5.1)
RBC # BLD: 4.82 M/UL (ref 4–5.2)
SODIUM BLD-SCNC: 138 MMOL/L (ref 136–145)
SODIUM BLD-SCNC: 140 MMOL/L (ref 136–145)
WBC # BLD: 5 K/UL (ref 4–11)

## 2022-05-18 PROCEDURE — 94761 N-INVAS EAR/PLS OXIMETRY MLT: CPT

## 2022-05-18 PROCEDURE — 97530 THERAPEUTIC ACTIVITIES: CPT

## 2022-05-18 PROCEDURE — 36415 COLL VENOUS BLD VENIPUNCTURE: CPT

## 2022-05-18 PROCEDURE — 99232 SBSQ HOSP IP/OBS MODERATE 35: CPT | Performed by: INTERNAL MEDICINE

## 2022-05-18 PROCEDURE — 6360000002 HC RX W HCPCS: Performed by: STUDENT IN AN ORGANIZED HEALTH CARE EDUCATION/TRAINING PROGRAM

## 2022-05-18 PROCEDURE — 83735 ASSAY OF MAGNESIUM: CPT

## 2022-05-18 PROCEDURE — 2700000000 HC OXYGEN THERAPY PER DAY

## 2022-05-18 PROCEDURE — 6370000000 HC RX 637 (ALT 250 FOR IP): Performed by: INTERNAL MEDICINE

## 2022-05-18 PROCEDURE — 85025 COMPLETE CBC W/AUTO DIFF WBC: CPT

## 2022-05-18 PROCEDURE — 97116 GAIT TRAINING THERAPY: CPT

## 2022-05-18 PROCEDURE — 94680 O2 UPTK RST&XERS DIR SIMPLE: CPT

## 2022-05-18 PROCEDURE — 80048 BASIC METABOLIC PNL TOTAL CA: CPT

## 2022-05-18 PROCEDURE — 6370000000 HC RX 637 (ALT 250 FOR IP): Performed by: STUDENT IN AN ORGANIZED HEALTH CARE EDUCATION/TRAINING PROGRAM

## 2022-05-18 RX ORDER — LEVOFLOXACIN 500 MG/1
500 TABLET, FILM COATED ORAL DAILY
Qty: 3 TABLET | Refills: 0 | Status: SHIPPED | OUTPATIENT
Start: 2022-05-19 | End: 2022-05-22

## 2022-05-18 RX ORDER — LISINOPRIL 5 MG/1
5 TABLET ORAL DAILY
Qty: 30 TABLET | Refills: 3 | Status: SHIPPED | OUTPATIENT
Start: 2022-05-19 | End: 2022-09-13 | Stop reason: SDUPTHER

## 2022-05-18 RX ORDER — FLUCONAZOLE 200 MG/1
200 TABLET ORAL DAILY
Qty: 7 TABLET | Refills: 0 | Status: SHIPPED | OUTPATIENT
Start: 2022-05-18 | End: 2022-05-25

## 2022-05-18 RX ORDER — METOPROLOL SUCCINATE 25 MG/1
25 TABLET, EXTENDED RELEASE ORAL DAILY
Qty: 30 TABLET | Refills: 3 | Status: SHIPPED | OUTPATIENT
Start: 2022-05-19 | End: 2022-09-13 | Stop reason: SDUPTHER

## 2022-05-18 RX ORDER — FUROSEMIDE 80 MG
80 TABLET ORAL 2 TIMES DAILY
Qty: 60 TABLET | Refills: 3 | Status: SHIPPED | OUTPATIENT
Start: 2022-05-18 | End: 2022-05-24

## 2022-05-18 RX ORDER — ATORVASTATIN CALCIUM 10 MG/1
10 TABLET, FILM COATED ORAL DAILY
Qty: 30 TABLET | Refills: 3 | Status: SHIPPED | OUTPATIENT
Start: 2022-05-19 | End: 2022-09-13 | Stop reason: SDUPTHER

## 2022-05-18 RX ADMIN — LISINOPRIL 5 MG: 5 TABLET ORAL at 08:54

## 2022-05-18 RX ADMIN — THIAMINE HCL TAB 100 MG 100 MG: 100 TAB at 08:54

## 2022-05-18 RX ADMIN — THERA TABS 1 TABLET: TAB at 08:54

## 2022-05-18 RX ADMIN — MICONAZOLE NITRATE: 2 POWDER TOPICAL at 08:55

## 2022-05-18 RX ADMIN — POLYMYXIN B SULFATE, BACITRACIN ZINC, NEOMYCIN SULFATE: 5000; 3.5; 4 OINTMENT TOPICAL at 08:56

## 2022-05-18 RX ADMIN — ATORVASTATIN CALCIUM 10 MG: 10 TABLET, FILM COATED ORAL at 08:53

## 2022-05-18 RX ADMIN — HEPARIN SODIUM 5000 UNITS: 5000 INJECTION INTRAVENOUS; SUBCUTANEOUS at 05:12

## 2022-05-18 RX ADMIN — LEVOFLOXACIN 500 MG: 500 TABLET, FILM COATED ORAL at 08:54

## 2022-05-18 RX ADMIN — FUROSEMIDE 80 MG: 40 TABLET ORAL at 08:53

## 2022-05-18 RX ADMIN — METOPROLOL SUCCINATE 25 MG: 25 TABLET, EXTENDED RELEASE ORAL at 08:54

## 2022-05-18 RX ADMIN — POTASSIUM CHLORIDE 20 MEQ: 20 TABLET, EXTENDED RELEASE ORAL at 08:54

## 2022-05-18 ASSESSMENT — ENCOUNTER SYMPTOMS
SHORTNESS OF BREATH: 1
GASTROINTESTINAL NEGATIVE: 1
EYES NEGATIVE: 1
CHOKING: 0

## 2022-05-18 NOTE — TELEPHONE ENCOUNTER
Caridad Rich, DO  P [facility]  Needs to see Carlos Manuel Ruiz in 3-8 weeks for hospital follow up due to hypoxia.       SW patient and tried to schedule an appointment for her. Unfortunately I got all the way to the end of July and still no open appointments. Is there a day that you would be able to see her in 3-8 weeks time? Please advise. Message routed to Dr. Carlos Manuel Ruiz.

## 2022-05-18 NOTE — PROGRESS NOTES
Went over d/c instructions with pt, no questions at this time. Pt being wheeled out with belongings and home oxygen by transport to go home. Pt will stop at pharmacy to  prescriptions on way out.

## 2022-05-18 NOTE — PROGRESS NOTES
Patient on room air at rest 86%  Patient on 2 lpm at rest 91%    Patient on 2 lpm with ambulation 90-92%    Patient qualifies for home oxygen at 2 lpm     [facility] notified of home oxygen needs.

## 2022-05-18 NOTE — DISCHARGE INSTR - COC
Continuity of Care Form    Patient Name: Enzo Orlando   :  1965  MRN:  [id number]    Admit date:  2022  Discharge date:  ***    Code Status Order: Full Code   Advance Directives:      Admitting Physician:  Letitia Ibrahim DO  PCP: No primary care provider on file. Discharging Nurse: [facility] Unit/Room#: L1X-5522/5277-01  Discharging Unit Phone Number: ***    Emergency Contact:   Extended Emergency Contact Information  Primary Emergency Contact: Emily Hinton  Home Phone: [phone]  Relation: Parent  Secondary Emergency Contact: Jamarcus Sharif  Mobile Phone: [phone]  Relation: Spouse    Past Surgical History:  History reviewed. No pertinent surgical history. Immunization History: There is no immunization history on file for this patient.     Active Problems:  Patient Active Problem List   Diagnosis Code    Malignancy ([facility] [address] C80.1    Leg swelling M79.89    Cellulitis L03.90    New onset of congestive heart failure (HCC) I50.9    Acute on chronic diastolic heart failure (HCC) I50.33    Type 2 diabetes mellitus without complication, without long-term current use of insulin (HCC) E11.9    Aortic atherosclerosis (HCC) I70.0    Nicotine dependence F17.200    Iron deficiency anemia D50.9    Acute on chronic systolic heart failure (HCC) I50.23    Acute respiratory failure with hypoxia (HCC) J96.01    Acute on chronic respiratory failure with hypercapnia (HCC) J96.22    Acute pulmonary edema (HCC) J81.0    Pleural effusion, right C70    Metabolic alkalosis S61.7       Isolation/Infection:   Isolation            No Isolation          Patient Infection Status       None to display            Nurse Assessment:  Last Vital Signs: /76   Pulse 77   Temp 98.2 °F (36.8 °C) (Oral)   Resp 18   Ht 5' 6\" (1.676 m)   Wt 209 lb 14.4 oz (95.2 kg)   SpO2 95%   BMI 33.88 kg/m²     Last documented pain score (0-10 scale): Pain Level: 0  Last Weight:   Wt Readings from Last 1 Encounters:   22 209 lb 14.4 oz (95.2 kg)     Mental Status:  {IP PT MENTAL STATUS:47975}    IV Access:  { DONOVAN IV ACCESS:657970468}    Nursing Mobility/ADLs:  Walking   {CHP DME TOTC:028187684}  Transfer  {CHP DME JMKJ:894627509}  Bathing  {P DME ESAA:891558363}  Dressing  {CHP DME KCS}  Toileting  {CHP DME DEWV:292219987}  Feeding  {P DME OGCM:315990523}  Med Admin  {P DME NALZ:042234138}  Med Delivery   { DONOVAN MED Delivery:195795224}    Wound Care Documentation and Therapy:  Wound 22 Pretibial Right (Active)   Wound Etiology Skin Tear 22   Dressing Status Clean;Dry; Intact 22   Wound Cleansed Not Cleansed 22   Dressing/Treatment Non adherent; Roll gauze; Ace wrap 22   Offloading for Diabetic Foot Ulcers Other (comment) 22   Dressing Change Due 22   Wound Assessment Other (Comment) 22   Drainage Amount None 22   Drainage Description Serous 22 0800   Odor None 22   Jackie-wound Assessment Other (Comment) 22   Margins Other (Comment) 22   Number of days: 9       Wound 22 Breast Lateral;Right (Active)   Wound Image    22 1120   Wound Etiology Other 22   Dressing Status Dry; Intact; New drainage noted; Old drainage noted 22   Wound Cleansed Not Cleansed 22   Dressing/Treatment Silicone border 2145   Dressing Change Due 22   Wound Assessment Other (Comment) 22   Drainage Amount Small 22   Drainage Description Other (Comment) 22 214   Odor None 22   Jackie-wound Assessment Other (Comment) 22   Margins Other (Comment) 22   Number of days: 9        Elimination:  Continence:    Bowel: {YES / FD:39457}  Bladder: {YES / RU:04657}  Urinary Catheter: {Urinary Catheter:005566648}   Colostomy/Ileostomy/Ileal Conduit: {YES / FF:20658} Date of Last BM: ***    Intake/Output Summary (Last 24 hours) at 2022 1134  Last data filed at 2022 0500  Gross per 24 hour   Intake 500 ml   Output 2000 ml   Net -1500 ml     I/O last 3 completed shifts: In: 910 [P.O.:890; I.V.:20]  Out: 3100 [Urine:3100]    Safety Concerns:     { DONOVAN Safety Concerns:912867322}    Impairments/Disabilities:      { DONOVAN Impairments/Disabilities:110460756}    Nutrition Therapy:  Current Nutrition Therapy:   [facility] Diet List:686645729}    Routes of Feeding: {[facility] DME Other Feedings:855858932}  Liquids: {Slp liquid thickness:48165}  Daily Fluid Restriction: {CH DME Yes amt example:297000424}  Last Modified Barium Swallow with Video (Video Swallowing Test): {Done Not Done OQS}    Treatments at the Time of Hospital Discharge:   Respiratory Treatments: ***  Oxygen Therapy:  {Therapy; copd oxygen:05640}  Ventilator:    {[facility] Vent NLMC:718381114}    Rehab Therapies: {THERAPEUTIC INTERVENTION:1425229688}  Weight Bearing Status/Restrictions: [facility] Weight Bearin}  Other Medical Equipment (for information only, NOT a DME order):  {EQUIPMENT:373844503}  Other Treatments: ***    Patient's personal belongings (please select all that are sent with patient):  {[facility] DME Belongings:620567720}    RN SIGNATURE:  {Esignature:177420791}    CASE MANAGEMENT/SOCIAL WORK SECTION    Inpatient Status Date: ***    Readmission Risk Assessment Score:  Readmission Risk              Risk of Unplanned Readmission:  15           Discharging to Facility/ Agency   Name:   Address:  Phone:  Fax:    Dialysis Facility (if applicable)   Name:  Address:  Dialysis Schedule:  Phone:  Fax:    / signature: {Esignature:623571436}    PHYSICIAN SECTION    Prognosis: Good    Condition at Discharge: Stable    Rehab Potential (if transferring to Rehab): Fair    Recommended Labs or Other Treatments After Discharge:  Follow-up with PCP within 7 days from discharge, not doing so could have life-threatening consequences. Take medication as instructed;  return to the emergency department if persistent symptoms, experiencing side effects from medication including nausea vomiting or unable to keep medications down. Follow up with pulmonology and oncology recommended    Physician Certification: I certify the above information and transfer of Laura Mireles  is necessary for the continuing treatment of the diagnosis listed and that she requires Home Care for greater 30 days.      Update Admission H&P: No change in H&P    PHYSICIAN SIGNATURE:  Electronically signed by German Pop MD on 5/18/22 at 11:34 AM EDT

## 2022-05-18 NOTE — PROGRESS NOTES
Pulmonary Progress Note    CC:  Follow up respiratory failure    Subjective:  Down to 2 liters of oxygen   Thinks she is going home today     Intake/Output Summary (Last 24 hours) at 5/18/2022 0810  Last data filed at 5/18/2022 0500  Gross per 24 hour   Intake 800 ml   Output 2000 ml   Net -1200 ml         PHYSICAL EXAM:  Blood pressure 132/76, pulse 77, temperature 98.2 °F (36.8 °C), temperature source Oral, resp. rate 19, height 5' 6\" (1.676 m), weight 209 lb 14.4 oz (95.2 kg), SpO2 100 %.'  Gen: Chronically ill appearing   Eyes: PERRL. No sclera icterus. No conjunctival injection. ENT: No discharge. Pharynx clear. External appearance of ears and nose normal.  Neck: Trachea midline. No obvious mass. Resp: Faint crackles   CV: Regular rate. Regular rhythm. No murmur or rub. GI: Non-tender. Non-distended. No hernia. Skin: Pale  Lymph: No cervical LAD. No supraclavicular LAD. M/S: No cyanosis. No clubbing. No joint deformity. Neuro: Moves all four extremities. CN 2-12 tested, no defect noted.   Ext:   Decreased edema but still present     Medications:    Scheduled Meds:   levoFLOXacin  500 mg Oral Daily    potassium chloride  20 mEq Oral Daily with breakfast    furosemide  80 mg Oral BID    neomycin-bacitracin-polymyxin   Topical Daily    metoprolol succinate  25 mg Oral Daily    lidocaine-EPINEPHrine  20 mL IntraDERmal Once    atorvastatin  10 mg Oral Daily    heparin (porcine)  5,000 Units SubCUTAneous 3 times per day    lisinopril  5 mg Oral Daily    miconazole   Topical BID    fluconazole  200 mg Oral Daily    sodium chloride flush  5-40 mL IntraVENous 2 times per day    multivitamin  1 tablet Oral Daily    thiamine  100 mg Oral Daily       Continuous Infusions:   sodium chloride 25 mL/hr (05/12/22 4309)       PRN Meds:  naloxone, acetaminophen **OR** acetaminophen, ondansetron, nicotine, sodium chloride flush, sodium chloride, naloxone    Labs:  CBC:   Recent Labs     05/16/22  6640 WBC 4.2   HGB 11.0*   HCT 35.6*   MCV 73.6*        BMP:   Recent Labs     05/16/22  0837 05/17/22  0612 05/18/22  0649    141 140   K 3.4* 3.4* 3.7   CL 91* 94* 97*   CO2 42* 39* 36*   BUN 10 10 11   CREATININE 0.6 0.7 0.8     LIVER PROFILE: No results for input(s): AST, ALT, LIPASE, BILIDIR, BILITOT, ALKPHOS in the last 72 hours. Invalid input(s): AMYLASE,  ALB  PT/INR: No results for input(s): PROTIME, INR in the last 72 hours. APTT: No results for input(s): APTT in the last 72 hours. UA:No results for input(s): NITRITE, COLORU, PHUR, LABCAST, WBCUA, RBCUA, MUCUS, TRICHOMONAS, YEAST, BACTERIA, CLARITYU, SPECGRAV, LEUKOCYTESUR, UROBILINOGEN, BILIRUBINUR, BLOODU, GLUCOSEU, AMORPHOUS in the last 72 hours. Invalid input(s): Radha Paddy  No results for input(s): PH, PCO2, PO2 in the last 72 hours. Films:  Chest imaging reports were reviewed and imaging was reviewed by me and showed right pleural effusion > left, ground glass, atelectasis    ABG:  Nothing new    I reviewed the labs and images listed above    Assessment/Plan:    Acute Hypoxic Respiratory Failure with saturations less than 90% on room air  Titrate oxygen for saturations greater than or equal to 90%  o Home oxygen assessment    Acute on Chronic Hypercapnic Respiratory Failure   Pulmonary edema    Contraction alkalosis  o S/p Diamox for 3 doses on 5/16  o Continue with lasix    Right Pleural Effusion (unclear if malignant)   Abnormal CT Chest:  Effusions, ground glass  o Continue with aggressive diuresis    Tobacco Abuse   o Cessation. Ok to DC. Will need to follow up with DR. Ivan Blanco.       DVT prophylaxis  Heparin       Ferny Abrams DO  [facility]

## 2022-05-18 NOTE — PROGRESS NOTES
PALLIATIVE MEDICINE PROGRESS NOTE     Patient name:Danielito Swann    LUTHER:[id number] :1965  Room/Bed:O2Q-9979/5277-01    LOS: 10 days        ASSESSMENT/RECOMMENDATIONS     55 y.o. female with hypoxia and swelling.          Symptom Management:  1. Hypoxia: Patient on 2 liters of oxygen via a high flow nasal cannula during my visit today. 2. Swelling: Patient now taking Lasix 80 mg PO scheduled BID. 3. Goals of Care: Again met the patient at her bedside (patient's spouse Liane Apley was present today for my visit). Patient again appeared to be oriented x 4. The patient also appeared to be decisional today. Reviewed patient's current health status, goals of care and code status. Patient indicated she believes she is getting discharged home today. Patient indicated she is looking forward to meeting with Walter Larios post her return home. Patient wishes to pursue aggressive therapy at this time and also wishes to remain a Full Code.      Patient/Family Goals of Care :     - Again met the patient at her bedside (patient's spouse Liane Apley was present today for my visit). Patient again appeared to be oriented x 4. The patient also appeared to be decisional today. Reviewed patient's current health status, goals of care and code status. Patient indicated she believes she is getting discharged home today. Patient indicated she is looking forward to meeting with Walter Larios post her return home. Patient wishes to pursue aggressive therapy at this time and also wishes to remain a Full Code.  - Met patient at her bedside today. Patient was again oriented x 4 and again appeared to be decisional today. Again reviewed patient's current health condition, goals and code status. Patient indicated today that she is hoping to be discharged from the hospital soon and would like to continue with all aggressive treatment at this time.   Code status was again reviewed and the patient continues to be a Full Code.    5/13 - Met patient and her spouse Sam Sue at the bedside today. Patient continues to be oriented x 4. Reviewed patient's current course of care, health status, verified goals of care and verified code status. Patient indicated she would like to pursue all aggressive therapy at this time (including being resected by Dr. Radha Duckworth if deemed to be medically necessary). The patient would like to remain a Full Code for her code status. 5/11 - Again met the patient at her bedside. Patient's spouse Sam Sue and mother Emily were present for the visit today. Patient was again oriented x 4 and appeared to be decisional today. Again reviewed patient's current health condition, goals and code status. Patient indicated she would like to continue on with aggressive treatment at this time and hopes to be able to return home. Patient and her family would like to meet with [facility] post the patient's hospital stay to help assist with in home care. Patient and her family have POA paperwork and indicated they intended to fill out and complete POA paperwork today (patient again intends to name her mother Emily as her POA). Code status is to remain a Full Code. 5/9 - Met patient and her mother Emily at the bedside today. Patient was lethargic but oriented x 4. Patient did appear to be decisional today. Reviewed patient's current health status, goals of care and code status. Patient indicated she is not interested in hospice services at this time and would like to pursue all aggressive care at this time. Code status was reviewed and the patient would like to remain a Full Code.     Disposition/Discharge Plan:   An outpatient [facility] referral was ordered for post-discharge to followup with the patient once they return home.     Advance Directives:  · Surrogate Decision Maker: Emily, patient's mother, is patient's POA.     · Code status:  Full Code     Case discussed with: patient, Sam Sue (spouse)  Thank you for allowing us to participate in the care of this patient. SUBJECTIVE     Chief Complaint: Hypoxia    Last 24 hours:   Patient again appeared to be oriented x 4. The patient also appeared to be decisional today. Patient indicated she believes she is getting discharged home today. Patient indicated she is looking forward to meeting with Flak[facility] Ocasio post her return home. Patient wishes to pursue aggressive therapy at this time and also wishes to remain a Full Code. ROS:    Review of Systems   Constitutional: Positive for fatigue. HENT: Negative. Eyes: Negative. Respiratory: Positive for shortness of breath. Negative for choking. Cardiovascular: Positive for leg swelling. Gastrointestinal: Negative. Musculoskeletal: Negative. Skin: Positive for pallor. Neurological: Positive for weakness. Psychiatric/Behavioral: Negative. All other systems reviewed and are negative. A complete 10 count ROS was obtained. Pertinent positives mentioned above in HPI/ROS. All others if not mentioned are negative. OBJECTIVE   /76   Pulse 77   Temp 98.2 °F (36.8 °C) (Oral)   Resp 18   Ht 5' 6\" (1.676 m)   Wt 209 lb 14.4 oz (95.2 kg)   SpO2 95%   BMI 33.88 kg/m²   I/O last 3 completed shifts: In: 910 [P.O.:890; I.V.:20]  Out: 3100 [Urine:3100]  No intake/output data recorded. Physical Exam  Vitals reviewed. Constitutional:       Appearance: She is ill-appearing. Interventions: Nasal cannula in place. HENT:      Head: Normocephalic and atraumatic. Nose: Nose normal.   Eyes:      Extraocular Movements: Extraocular movements intact. Pupils: Pupils are equal, round, and reactive to light. Cardiovascular:      Rate and Rhythm: Normal rate. Pulses: Normal pulses. Pulmonary:      Comments: Breath sounds clear but diminished bilaterally during my visit. Abdominal:      General: Bowel sounds are normal.      Palpations: Abdomen is soft.    Musculoskeletal: Cervical back: Neck supple. Right lower leg: Edema (1+) present. Left lower leg: Edema (1+) present. Skin:     General: Skin is warm and dry. Coloration: Skin is pale. Neurological:      Comments: Patient again appeared to be oriented x 4 and decisional today. Psychiatric:         Behavior: Behavior normal.         Thought Content:  Thought content normal.         Judgment: Judgment normal.        Total time: 39 minutes  >50% of time spent counseling patient at bedside or POA/family member if applicable , reviewing information and discussing care, coordinating with care team       Signed By: Electronically signed by APRIL Avitia CNP on 5/18/2022 at 11:39 AM   Palliative Medicine   [phone]    May 18, 2022

## 2022-05-18 NOTE — CARE COORDINATION
CASE MANAGEMENT DISCHARGE SUMMARY:  Met with patient. Patient up independently in room during our discussion. Discussed home care, patient declines needing home care. DME already provided to patient by [facility]. No additional needs to note.     DISCHARGE DATE: 5/18/2022    DISCHARGED TO: Home with family support     HOME CARE AGENCY: Patient declines    DME: [facility] provided oxygen, shower chair, & wheeled walker    TRANSPORTATION:              TIME: afternoon    PREFERRED [facility]Y: [facility]             NUMBER: [phone]    Electronically signed by Villa Canavan, RN Case Management [phone] on 5/18/2022 at 12:33 PM

## 2022-05-18 NOTE — DISCHARGE SUMMARY
Hospital Medicine Discharge Summary    Patient ID: Minoo Preston      Patient's PCP: No primary care provider on file. Admit Date: 5/8/2022     Discharge Date:   05/18/22      Admitting Provider: Beryle Davidson, DO     Discharge Provider: Yousuf Vasquez MD     Discharge Diagnoses: Active Hospital Problems    Diagnosis     Metabolic alkalosis [Y53.5]      Priority: Medium    Acute respiratory failure with hypoxia (HCC) [J96.01]      Priority: Medium    Acute on chronic respiratory failure with hypercapnia (HCC) [J96.22]      Priority: Medium    Acute pulmonary edema (HCC) [J81.0]      Priority: Medium    Pleural effusion, right [J90]      Priority: Medium    Acute on chronic systolic heart failure (HCC) [I50.23]      Priority: Medium    Iron deficiency anemia [D50.9]      Priority: Medium    New onset of congestive heart failure (HCC) [I50.9]      Priority: Medium    Acute on chronic diastolic heart failure (HCC) [I50.33]      Priority: Medium    Type 2 diabetes mellitus without complication, without long-term current use of insulin (HCC) [E11.9]      Priority: Medium    Aortic atherosclerosis (HCC) [I70.0]      Priority: Medium    Nicotine dependence [F17.200]      Priority: Medium    Malignancy (HCC) [C80.1]      Priority: Medium    Leg swelling [M79.89]      Priority: Medium    Cellulitis [L03.90]      Priority: Medium       The patient was seen and examined on day of discharge and this discharge summary is in conjunction with any daily progress note from day of discharge. Hospital Course: Patient is a 62-year-old female admitted to the hospital with leg swelling shortness of breath and large breast lesion. Diagnosed with invasive breast cancer with superinfection.   Also treated for hypercarbic hypoxic respiratory failure requiring BiPAP  Seen by pulmonary/infectious disease/cardiology/hematology oncology/breast surgery consultants     Treated for diastolic congestive heart failure along with IV antibiotics for infected breast mass     Patient underwent incisional biopsy of the right breast on 5/9/2022 by Dr. Eric Kumar     Received IV diuresis and pulmonary status improved. Eventually antibiotics were de-escalated to oral levofloxacin and oral fluconazole    She is stable from cardiac condition. Saturating well on 2 L.  patient will follow-up with breast surgery as an outpatient for surgical excision/right modified radical mastectomy.     Oncology would like outpatient PET/CT to evaluate bilateral axillary lymphadenopathy and potential adjuvant radiation therapy and adjuvant chemo as well      Physical Exam Performed:     /76   Pulse 77   Temp 98.2 °F (36.8 °C) (Oral)   Resp 18   Ht 5' 6\" (1.676 m)   Wt 209 lb 14.4 oz (95.2 kg)   SpO2 95%   BMI 33.88 kg/m²       General appearance:  No apparent distress, appears stated age and cooperative. HEENT:  Normal cephalic, atraumatic without obvious deformity. Pupils equal, round, and reactive to light. Extra ocular muscles intact. Conjunctivae/corneas clear. Neck: Supple, with full range of motion. No jugular venous distention. Trachea midline. Respiratory:  Normal respiratory effort. Clear to auscultation, bilaterally without Rales/Wheezes/Rhonchi. Cardiovascular:  Regular rate and rhythm with normal S1/S2 without murmurs, rubs or gallops. Abdomen: Soft, non-tender, non-distended with normal bowel sounds. Musculoskeletal:  No clubbing, cyanosis or edema bilaterally. Full range of motion without deformity. Skin: Skin color, texture, turgor normal.  No rashes or lesions. Neurologic:  Neurovascularly intact without any focal sensory/motor deficits. Cranial nerves: II-XII intact, grossly non-focal.  Psychiatric:  Alert and oriented, thought content appropriate, normal insight  Capillary Refill: Brisk,< 3 seconds   Peripheral Pulses: +2 palpable, equal bilaterally       Labs:  For convenience and continuity at follow-up the following most recent labs are provided:      CBC:    Lab Results   Component Value Date    WBC 5.0 05/18/2022    HGB 11.0 05/18/2022    HCT 35.6 05/18/2022     05/18/2022       Renal:    Lab Results   Component Value Date     05/18/2022    K 3.6 05/18/2022    K 3.9 05/08/2022    CL 95 05/18/2022    CO2 34 05/18/2022    BUN 12 05/18/2022    CREATININE 0.7 05/18/2022    CALCIUM 9.4 05/18/2022         Significant Diagnostic Studies    Radiology:   NM BONE SCAN WHOLE BODY   Final Result   No generalized scintigraphic pattern of osseous metastatic disease. Soft tissue anasarca. VL Extremity Venous Bilateral   Final Result      CT CHEST ABDOMEN PELVIS W CONTRAST   Final Result   1. CT CHEST: Mild congestive heart failure. 2. Nonspecific main pulmonary artery dilatation can be seen with pulmonary   hypertension. 3. Mild atelectasis bilateral lower lungs and small volume right pleural   effusion. Pneumonia is a consideration. 4. Large fungating right breast mass with diffuse right breast skin   thickening almost certainly primary breast cancer. 5. Bilateral axillary lymph node enlargement may be reactive or reflect   metastatic disease. 6. Anasarca. 7. CT ABDOMEN/PELVIS: No acute abnormality. 8. Nonspecific left adrenal 1.5 cm nodule. Follow-up noncontrast CT abdomen   no sooner than 48 hours from contrast administration recommended. 9. Nonspecific mild adenopathy right upper quadrant common with hepatic   steatosis, probably reactive. 10. Nonspecific splenomegaly. 11. Hepatic steatosis and hepatomegaly. 12. Anasarca. XR CHEST PORTABLE   Final Result   Pulmonary vascular congestion/interstitial edema. Cardiomegaly.                 Consults:     IP CONSULT TO HEART FAILURE NURSE/COORDINATOR  IP CONSULT TO DIETITIAN  IP CONSULT TO INFECTIOUS DISEASES  IP CONSULT TO GENERAL SURGERY  IP CONSULT TO OB GYN  IP CONSULT TO ONCOLOGY  IP CONSULT TO CARDIOLOGY  IP CONSULT TO SOCIAL WORK  IP CONSULT TO PULMONOLOGY  IP CONSULT TO PALLIATIVE CARE  IP CONSULT TO SPIRITUAL SERVICES  IP CONSULT TO HOME CARE NEEDS    Disposition:  home     Condition at Discharge: Stable    Discharge Instructions/Follow-up:  Follow-up with PCP within 7 days from discharge, not doing so could have life-threatening consequences. Take medication as instructed;  return to the emergency department if persistent symptoms, experiencing side effects from medication including nausea vomiting or unable to keep medications down. Follow-up with oncology and pulmonology recommended    Code Status:  Full Code     Activity: activity as tolerated    Diet: cardiac diet      Discharge Medications:     Current Discharge Medication List           Details   fluconazole (DIFLUCAN) 200 MG tablet Take 1 tablet by mouth daily for 7 days  Qty: 7 tablet, Refills: 0      atorvastatin (LIPITOR) 10 MG tablet Take 1 tablet by mouth daily  Qty: 30 tablet, Refills: 3      lisinopril (PRINIVIL;ZESTRIL) 5 MG tablet Take 1 tablet by mouth daily  Qty: 30 tablet, Refills: 3      metoprolol succinate (TOPROL XL) 25 MG extended release tablet Take 1 tablet by mouth daily  Qty: 30 tablet, Refills: 3      furosemide (LASIX) 80 MG tablet Take 1 tablet by mouth 2 times daily  Qty: 60 tablet, Refills: 3      levoFLOXacin (LEVAQUIN) 500 MG tablet Take 1 tablet by mouth daily for 3 doses  Qty: 3 tablet, Refills: 0             Time Spent on discharge is more than 30 minutes in the examination, evaluation, counseling and review of medications and discharge plan. Signed:    Marlyn Roth MD   5/18/2022      Thank you No primary care provider on file. for the opportunity to be involved in this patient's care. If you have any questions or concerns, please feel free to contact me at [phone].

## 2022-05-18 NOTE — PROGRESS NOTES
Hematology Oncology Daily Progress Note    Admit Date: 5/8/2022  Hospital day several    Subjective:     Patient has complaints of mild to mod fatigue--denies sob/cp. Medication side effects: none    Scheduled Meds:   levoFLOXacin  500 mg Oral Daily    potassium chloride  20 mEq Oral Daily with breakfast    furosemide  80 mg Oral BID    neomycin-bacitracin-polymyxin   Topical Daily    metoprolol succinate  25 mg Oral Daily    lidocaine-EPINEPHrine  20 mL IntraDERmal Once    atorvastatin  10 mg Oral Daily    heparin (porcine)  5,000 Units SubCUTAneous 3 times per day    lisinopril  5 mg Oral Daily    miconazole   Topical BID    fluconazole  200 mg Oral Daily    sodium chloride flush  5-40 mL IntraVENous 2 times per day    multivitamin  1 tablet Oral Daily    thiamine  100 mg Oral Daily     Continuous Infusions:   sodium chloride 25 mL/hr (05/12/22 2109)     PRN Meds:naloxone, acetaminophen **OR** acetaminophen, ondansetron, nicotine, sodium chloride flush, sodium chloride, naloxone    Review of Systems  Pertinent items are noted in HPI. REVIEW OF SYSTEMS:         · Constitutional: Denies fever, sweats, weight loss     · Eyes: No visual changes or diplopia. No scleral icterus. · ENT: No Headaches, hearing loss or vertigo. No mouth sores or sore throat. · Cardiovascular: No chest pain, dyspnea on exertion, palpitations or loss of consciousness. · Respiratory: No cough or wheezing, no sputum production. No hemoptysis. .    · Gastrointestinal: No abdominal pain, appetite loss, blood in stools. No change in bowel habits. · Genitourinary: No dysuria, trouble voiding, or hematuria. · Musculoskeletal:  Generalized weakness. No joint complaints. · Integumentary: No rash or pruritis. · Neurological: No headache, diplopia. No change in gait, balance, or coordination. No paresthesias. · Endocrine: No temperature intolerance. No excessive thirst, fluid intake, or urination.    · Hematologic/Lymphatic: No abnormal bruising or ecchymoses, blood clots or swollen lymph nodes. · Allergic/Immunologic: No nasal congestion or hives. ·     Objective:     Patient Vitals for the past 8 hrs:   BP Temp Temp src Pulse Resp SpO2 Weight   05/18/22 0545 -- -- -- -- -- -- 209 lb 14.4 oz (95.2 kg)   05/18/22 0500 132/76 98.2 °F (36.8 °C) Oral 77 19 100 % --     I/O last 3 completed shifts: In: 910 [P.O.:890; I.V.:20]  Out: 3100 [Urine:3100]  No intake/output data recorded. /76   Pulse 77   Temp 98.2 °F (36.8 °C) (Oral)   Resp 19   Ht 5' 6\" (1.676 m)   Wt 209 lb 14.4 oz (95.2 kg)   SpO2 100%   BMI 33.88 kg/m²     General Appearance:    Alert, cooperative, no distress, appears stated age   Head:    Normocephalic, without obvious abnormality, atraumatic   Eyes:    PERRL, conjunctiva/corneas clear, EOM's intact, fundi     benign, both eyes        Ears:    Normal TM's and external ear canals, both ears   Nose:   Nares normal, septum midline, mucosa normal, no drainage    or sinus tenderness   Throat:   Lips, mucosa, and tongue normal; teeth and gums normal   Neck:   Supple, symmetrical, trachea midline, no adenopathy;        thyroid:  No enlargement/tenderness/nodules; no carotid    bruit or JVD   Back:     Symmetric, no curvature, ROM normal, no CVA tenderness   Lungs:     Clear to auscultation bilaterally, respirations unlabored   Chest wall:    No tenderness or deformity   Heart:    Regular rate and rhythm, S1 and S2 normal, no murmur, rub   or gallop   Abdomen:     Soft, non-tender, bowel sounds active all four quadrants,     no masses, no organomegaly           Extremities:   Extremities normal, atraumatic, no cyanosis or edema   Pulses:   2+ and symmetric all extremities   Skin:   Skin color, texture, turgor normal, no rashes or lesions   Lymph nodes:   Cervical, supraclavicular, and axillary nodes normal   Neurologic:   CNII-XII intact.  Normal strength, sensation and reflexes       throughout       Data Review  CBC:   Lab Results   Component Value Date    WBC 4.2 05/16/2022    RBC 4.84 05/16/2022       Assessment:     Principal Problem:    Acute on chronic diastolic heart failure (HCC)  Active Problems:    Malignancy (HCC)    Leg swelling    Cellulitis    New onset of congestive heart failure (HCC)    Type 2 diabetes mellitus without complication, without long-term current use of insulin (HCC)    Aortic atherosclerosis (HCC)    Nicotine dependence    Iron deficiency anemia    Acute on chronic systolic heart failure (HCC)    Acute respiratory failure with hypoxia (HCC)    Acute on chronic respiratory failure with hypercapnia (HCC)    Acute pulmonary edema (HCC)    Pleural effusion, right    Metabolic alkalosis  Resolved Problems:    Suspected CHF (congestive heart failure)      Plan:     1. Phyllodes tumor of the breast.  She will likely have resection by Dr. Eyad Sandhu. I would like to get an outpatient PET/CT. She should see me in 1 week as a hospital follow-up. 2.  Diastolic dysfunction.   I will defer to the primary team.        Electronically signed by Petrona Dan MD on 5/18/2022 at 8:28 AM

## 2022-05-18 NOTE — PLAN OF CARE
Problem: Discharge Planning  Goal: Discharge to home or other facility with appropriate resources  Outcome: Progressing  Flowsheets (Taken 5/17/2022 2145)  Discharge to home or other facility with appropriate resources:   Identify barriers to discharge with patient and caregiver   Identify discharge learning needs (meds, wound care, etc)  Note: Pt asking about meds to beds program. RN called and verified with pharmacy that all newly prescribed medications would be filled and delivered to her at the bedside. Pt updated with information. RN explained that the pharmacist said she left a note for the day shift pharmacist to inform them that she had questions about her medications. Pt VU.

## 2022-05-18 NOTE — PROGRESS NOTES
Physical Therapy  Facility/Department: [address] PROGRESSIVE CARE  Physical Therapy Daily Treatment Note     Name: Brad Anguiano  : 1965  MRN: [id number]  Date of Service: 2022    Discharge Recommendations:  Home with Home health PT,Patient would benefit from continued therapy after discharge   PT Equipment Recommendations  Equipment Needed: Yes  Mobility Devices: Theotis Vidhya: Rolling      Assessment   Body Structures, Functions, Activity Limitations Requiring Skilled Therapeutic Intervention: Decreased functional mobility ; Decreased endurance  Assessment: Pt is a 55 y.o. F. admitted  for edema, R-sided breast Ca. Today, pt is planning d/c to Home with [place] [id number] follow up. Pt was Supervised-Independent for transfers and ambulation, however, going Home on 2L supplimental O2. Pt with mild de-sat after mobiity, however, education provided to pt shit session. Feel pt safe for return Home with Home PT. Brad Anguiano scored a 22/24 on the AM-PAC short mobility form. Current research shows that an AM-PAC score of 18 or greater is typically associated with a discharge to the patient's home setting. Based on the patient's AM-PAC score and their current functional mobility deficits, it is recommended that the patient have 2-3 sessions per week of Physical Therapy at d/c. Please see assessment section for further patient specific details. If patient discharges prior to next session this note will serve as a discharge summary. Please see below for the latest assessment towards goals. Therapy Prognosis: Good  Requires PT Follow-Up: Yes  Activity Tolerance  Activity Tolerance: Patient tolerated treatment well     Plan   Plan  Plan: 2-3 times per week  Specific Instructions for Next Treatment: Improve endurance;  Ambulate  Current Treatment Recommendations: Strengthening,Balance training,Gait training,Functional mobility training,Transfer training,Endurance training,Home exercise program,Equipment evaluation, education, & procurement,Therapeutic activities,Safety education & training,Patient/Caregiver education & training  Safety Devices  Type of Devices: Call light within reach,Nurse notified,Left in chair  Restraints  Restraints Initially in Place: No     Restrictions  Restrictions/Precautions  Restrictions/Precautions: Fall Risk  Position Activity Restriction  Other position/activity restrictions: 2L O2--being discharged on 2L     Subjective   General  Chart Reviewed: Yes  Additional Pertinent Hx: The patient is a 78-year-old female who hasnot seen a doctor in many years, who comes in to the hospital withincreasing lower extremity edema and shortness of breath. She hadmultiple issues. She had increasing lower extremity edema. An echo hasbeen ordered and a Doppler has been ordered. She also had a very largelocally advanced right-sided breast mass that was fungating and quitered. Family / Caregiver Present: No  Referring Practitioner: Anabel Winn MD  Diagnosis: R-sided breast Ca; Edema  Follows Commands: Within Functional Limits  Subjective  Subjective: Pt sitting up at eob, reported d/c to Home today. Pt on 2L O2 and being discharged on this amount. Pt denied concerns for home, all equipment arranged and in room. Pt agreed to PT session for ambulation. Social/Functional History  Social/Functional History  Lives With: Spouse  Type of Home: House  Home Layout: Two level,Able to Live on Main level with bedroom/bathroom  Home Access: Stairs to enter without rails  Entrance Stairs - Number of Steps: 3-4  Bathroom Shower/Tub: Tub/Shower unit  Bathroom Toilet: Standard  Home Equipment:  (No DME)  ADL Assistance: Independent  Ambulation Assistance: Independent  Transfer Assistance: Independent  Active :  Yes  Additional Comments: No Falls  Vision/Hearing  Vision Exceptions: Wears glasses at all times  Hearing: Within functional limits    Cognition   Orientation  Overall Orientation Status: Within Functional Limits     Objective   Bed mobility  Supine to Sit: Unable to assess (nt--pt sitting eob when PT arrived.)  Sit to Supine: Independent  Transfers  Sit to Stand: Supervision; Independent  Stand to sit: Independent;Supervision  Ambulation  Surface: level tile  Device: No Device  Assistance: Supervision  Quality of Gait: Steady pace, no LOB. No SOB/WALTERS with distances this date. Distance: x 70-80 ft in room \"laps\" without difficulty. Comments: O2 sats were 93% on 2L at rest/no activity. After ambulating 'laps' in room, sats were 90%. Pt without obvious SOB. Enducation provided for pacing activity, PLB if/as needed and signs and symptoms. Pt VU of all info given. Stairs/Curb  Stairs?: No     Balance  Sitting - Static: Good  Sitting - Dynamic: Good  Standing - Static: Good  Standing - Dynamic: Good  Comments: S/I sit and stand balance         AM-PAC Score  AM-PAC Inpatient Mobility Raw Score : 22 (05/18/22 1230)  AM-PAC Inpatient T-Scale Score : 53.28 (05/18/22 1230)  Mobility Inpatient CMS 0-100% Score: 20.91 (05/18/22 1230)  Mobility Inpatient CMS G-Code Modifier : CJ (05/18/22 1230)     Goals  Short Term Goals  Time Frame for Short term goals: 2-3 days--ALL GOALS ESSENTIALLY MET 5/18/22--except stairs NT   Short term goal 1: Bed mobility (I)  Short term goal 2: Transfers (I)  Short term goal 3: Ambulation 48' with LRAD, (I)  Short term goal 4: Ascend/Descend 4 steps with SBA  Patient Goals   Patient goals : To return directly home     Education  Patient Education  Education Given To: Patient  Education Provided: Role of Therapy; Family Education;Equipment; Energy Conservation; ADL Adaptive Strategies;Precautions  Education Method: Verbal  Education Outcome: Demonstrated understanding;Continued education needed  Therapy Time   Individual Concurrent Group Co-treatment   Time In 8879         Time Out 1228         Minutes 23          1 gt, 1 act charges   Sachi Collado, PT Electronically signed by Maria E Simpson, PT on 5/18/2022 at 12:32 PM

## 2022-05-18 NOTE — PLAN OF CARE
Problem: Discharge Planning  Goal: Discharge to home or other facility with appropriate resources  5/18/2022 1213 by Demetri Blancas RN  Outcome: Progressing  5/18/2022 0554 by Araseli Montes De Oca RN  Outcome: Progressing  Flowsheets (Taken 5/17/2022 2145)  Discharge to home or other facility with appropriate resources:   Identify barriers to discharge with patient and caregiver   Identify discharge learning needs (meds, wound care, etc)  Note: Pt asking about meds to beds program. RN called and verified with pharmacy that all newly prescribed medications would be filled and delivered to her at the bedside. Pt updated with information. RN explained that the pharmacist said she left a note for the day shift pharmacist to inform them that she had questions about her medications. Pt VU.       Problem: ABCDS Injury Assessment  Goal: Absence of physical injury  5/18/2022 1213 by Demetri Blancas RN  Outcome: Progressing  5/18/2022 0554 by Araseli Montes De Oca RN  Outcome: Adequate for Discharge  Flowsheets (Taken 5/18/2022 0553)  Absence of Physical Injury: Implement safety measures based on patient assessment

## 2022-05-20 ENCOUNTER — FOLLOWUP TELEPHONE ENCOUNTER (OUTPATIENT)
Dept: INPATIENT UNIT | Age: 57
End: 2022-05-20

## 2022-05-20 NOTE — FLOWSHEET NOTE
Spoke with Carolyne Cheney- she is feeling well. Her weight was down again, now 198lbs. She has lost 60 pounds of fluid weight and is feeling so much better. She is working hard on her diet, although admits that is hard. She is keeping under 64 ounces of fluid. She is feeling positive about her future, and is planning on having her breast removed. She meets with the surgeon 6/6 to make plans for that surgery.

## 2022-05-23 ENCOUNTER — TELEPHONE (OUTPATIENT)
Dept: PHARMACY | Age: 57
End: 2022-05-23

## 2022-05-23 NOTE — PROGRESS NOTES
[address]      Cardiology Consult    Laura Mireles  1965    May 24, 2022    Referring Physician: Alva Gonsales, APRIL - CNP  Reason for Referral: CHF    CC: \"Things are better. \"     HPI:  The patient is 55 y.o. female with a past medical history significant for diastolic heart failure, breast cancer, and hypertension who presents today for management of heart failure. She presented to the hospital with complaints of worsening lower extremity edema and shortness of breath. She did not routinely follow with a physician. She noted lower extremity edema for the past several years but did not seek medical attention. The swelling would wax and wane in intensity but would become so severe at times that she could not bend her knees or move her ankles. She would have skin breakdown at times on her lower extremities. However over a few weeks, she had been having worsening shortness of breath and PND/orthopnea. [facility] was also consulted as she had a large fungating right breast mass. She first noticed 2 \"marble sized\" masses in her right breast approximately 10 years ago. For over 1 year, the mass on her chest would open up periodically and bleed but she never sought medical attention. She was started on IV Lasix and diuresed approximately 60 pounds prior to discharge. Pulmonary was also consulted as the patient had hypercapnic respiratory failure requiring BiPAP. She was discharged home with supplemental oxygen. Today, she states overall she is doing well. She is accompanied by her . She denies any new cardaic sounding complaints. She continues to follow with [facility] and will likely be scheduled for breast surgery in the next few weeks. She is continues to utilize supplemental oxygen and has a scheduled appointment 6/6/22 with pulmoanry. Her weight is down ~80 lbs and states she is feeling \"better. \" She denies any chest pains or worsening shortness of breath.  She reports compliance with her medications and tolerating. She denies any abnormal bleeding or bruising. Patient denies exertional chest pain/pressure, dyspnea at rest, worsening WALTERS, PND, orthopnea, palpitations, lightheadedness, and syncope. Past Medical History:   Diagnosis Date    [facility] ([facility]     [facility]F (congestive heart failure) ([facility]      No past surgical history on file. History reviewed. No pertinent family history. Social History     Tobacco Use    Smoking status: Former Smoker     Packs/day: 1.50     Years: 43.00     Pack years: 64.50     Quit date: 2022     Years since quittin.0    Smokeless tobacco: Never Used   Substance Use Topics    Alcohol use: Yes     Alcohol/week: 1.0 - 3.0 standard drink     Types: 1 - 3 Cans of beer per week     Comment: 22 last drink    Drug use: Not on file       No Known Allergies  Current Outpatient Medications   Medication Sig Dispense Refill    furosemide (LASIX) 80 MG tablet Take 1 tablet by mouth daily 30 tablet 3    fluconazole (DIFLUCAN) 200 MG tablet Take 1 tablet by mouth daily for 7 days 7 tablet 0    atorvastatin (LIPITOR) 10 MG tablet Take 1 tablet by mouth daily 30 tablet 3    lisinopril (PRINIVIL;ZESTRIL) 5 MG tablet Take 1 tablet by mouth daily 30 tablet 3    metoprolol succinate (TOPROL XL) 25 MG extended release tablet Take 1 tablet by mouth daily 30 tablet 3     No current facility-administered medications for this visit. Review of Systems:  · Constitutional: No unanticipated weight loss. There's been no change in energy level, sleep pattern, or activity level. No fevers, chills. · Eyes: No visual changes or diplopia. No scleral icterus. · ENT: No Headaches, hearing loss or vertigo. No mouth sores or sore throat. · Cardiovascular: as reviewed in HPI  · Respiratory: No cough or wheezing, no sputum production. No hemoptysis. · Gastrointestinal: No abdominal pain, appetite loss, blood in stools.  No change in bowel or bladder habits. · Genitourinary: No dysuria, trouble voiding, or hematuria. · Musculoskeletal:  No gait disturbance, no joint complaints. · Integumentary: No rash or pruritis. · Neurological: No headache, diplopia, change in muscle strength, numbness or tingling. · Psychiatric: No anxiety or depression. · Endocrine: No temperature intolerance. No excessive thirst, fluid intake, or urination. No tremor. · Hematologic/Lymphatic: No abnormal bruising or bleeding, blood clots or swollen lymph nodes. · Allergic/Immunologic: No nasal congestion or hives. Physical Exam:   /66   Pulse 88   Ht 5' 6\" (1.676 m)   Wt 186 lb (84.4 kg)   SpO2 95%   BMI 30.02 kg/m²   Wt Readings from Last 3 Encounters:   05/24/22 186 lb (84.4 kg)   05/18/22 209 lb 14.4 oz (95.2 kg)     Constitutional: She is oriented to person, place, and time. She appears well-developed and well-nourished. In no acute distress. Head: Normocephalic and atraumatic. Pupils equal and round. Neck: Neck supple. No JVP or carotid bruit appreciated. No mass and no thyromegaly present. No lymphadenopathy present. Cardiovascular: Normal rate. Normal heart sounds. Exam reveals no gallop and no friction rub. No murmur heard. Pulmonary/Chest: Effort normal and breath sounds normal. No respiratory distress. She has no wheezes, rhonchi or rales. Fungating right breast mass. Abdominal: Soft, non-tender. Bowel sounds are normal. She exhibits no organomegaly, mass or bruit. Extremities: Right LE in Ace wrap. No LLE edema. No cyanosis or clubbing. Pulses are 2+ radial/carotid bilaterally. Neurological: No gross cranial nerve deficit. Coordination normal.   Skin: Skin is warm and dry. There is no rash or diaphoresis. Psychiatric: She has a normal mood and affect.  Her speech is normal and behavior is normal.     Lab Review:   FLP:    Lab Results   Component Value Date    TRIG 90 05/09/2022    HDL 26 05/09/2022    LDLCALC 67 05/09/2022    LABVLDL 18 arnav-operative period. Follow up in 1 month     Thank you very much for allowing me to participate in the care of your patient. Please do not hesitate to contact me if you have any questions. Sincerely,  Paresh Hardy. Tiarrajed Fuentes, [address] [facility]  Ph: [phone]  Fax: [phone]    This note was scribed in the presence of Dr Tiarra Fuentes MD by Henry Breaux RN. Physician Attestation: The scribes documentation has been prepared under my direction and personally reviewed by me in its entirety. I confirm that the note above accurately reflects all work, treatment, procedures, and medical decision making performed by me. All portions of the note including but not limited to the chief complaint, history of present illness, physical exam, assessment and plan/medical decision making were personally reviewed, edited, and updated on the day of the visit.

## 2022-05-23 NOTE — TELEPHONE ENCOUNTER
New referral for Heart Failure Services. Reached out to schedule the initial appointment. Spoke with patient. Scheduled for 5/31/22. Keeping daily weights. No weight gain at this time. Sees Dr. Donavan Box tomorrow.      Severa Johnson, PharmD  [facility]  Heart Failure Service  [phone] Problem: Depressed Mood (Adult/Pediatric)  Goal: *STG: Participates in treatment plan  Outcome: Progressing Towards Goal  Received this morning resting in bed. Slept in this morning,getting up around 11am. Is alert and oriented. Thoughts are clear and organized. Able to follow directions. Voiced he was having a good day and content so far. Talked a bit about his family and their living arrangements. Voiced he does not want to go to a facility,\"I could go back home if I had some money. \"Assisted up to the w/c after am cares. Ate breakfast and presently reading the paper. Goal today for patient is to talk with  about discharge plan. 1130-late entry,during am cares-new dressing applied to right port for dialysis. Site looks good without redness/irritation.

## 2022-05-24 ENCOUNTER — OFFICE VISIT (OUTPATIENT)
Dept: CARDIOLOGY CLINIC | Age: 57
End: 2022-05-24
Payer: MEDICARE

## 2022-05-24 VITALS
HEART RATE: 88 BPM | OXYGEN SATURATION: 95 % | HEIGHT: 66 IN | WEIGHT: 186 LBS | DIASTOLIC BLOOD PRESSURE: 66 MMHG | SYSTOLIC BLOOD PRESSURE: 128 MMHG | BODY MASS INDEX: 29.89 KG/M2

## 2022-05-24 DIAGNOSIS — I70.0 AORTIC ATHEROSCLEROSIS (HCC): ICD-10-CM

## 2022-05-24 DIAGNOSIS — I50.32 CHRONIC DIASTOLIC HEART FAILURE (HCC): Primary | ICD-10-CM

## 2022-05-24 DIAGNOSIS — Z01.810 PRE-OPERATIVE CARDIOVASCULAR EXAMINATION, SYMPTOMATIC CHF (HCC): ICD-10-CM

## 2022-05-24 DIAGNOSIS — J96.11 CHRONIC RESPIRATORY FAILURE WITH HYPOXIA (HCC): ICD-10-CM

## 2022-05-24 DIAGNOSIS — D50.9 IRON DEFICIENCY ANEMIA, UNSPECIFIED IRON DEFICIENCY ANEMIA TYPE: ICD-10-CM

## 2022-05-24 DIAGNOSIS — I50.9 PRE-OPERATIVE CARDIOVASCULAR EXAMINATION, SYMPTOMATIC CHF (HCC): ICD-10-CM

## 2022-05-24 DIAGNOSIS — C80.1 MALIGNANCY (HCC): ICD-10-CM

## 2022-05-24 PROCEDURE — 99214 OFFICE O/P EST MOD 30 MIN: CPT | Performed by: INTERNAL MEDICINE

## 2022-05-24 PROCEDURE — G8427 DOCREV CUR MEDS BY ELIG CLIN: HCPCS | Performed by: INTERNAL MEDICINE

## 2022-05-24 PROCEDURE — 1036F TOBACCO NON-USER: CPT | Performed by: INTERNAL MEDICINE

## 2022-05-24 PROCEDURE — 1111F DSCHRG MED/CURRENT MED MERGE: CPT | Performed by: INTERNAL MEDICINE

## 2022-05-24 PROCEDURE — G8417 CALC BMI ABV UP PARAM F/U: HCPCS | Performed by: INTERNAL MEDICINE

## 2022-05-24 PROCEDURE — 3017F COLORECTAL CA SCREEN DOC REV: CPT | Performed by: INTERNAL MEDICINE

## 2022-05-24 RX ORDER — FUROSEMIDE 80 MG
80 TABLET ORAL DAILY
Qty: 30 TABLET | Refills: 3 | Status: SHIPPED | OUTPATIENT
Start: 2022-05-24 | End: 2022-09-23

## 2022-05-27 ENCOUNTER — HOSPITAL ENCOUNTER (OUTPATIENT)
Dept: WOUND CARE | Age: 57
Discharge: HOME | End: 2022-05-27
Payer: MEDICARE

## 2022-05-27 VITALS
TEMPERATURE: 96.3 F | DIASTOLIC BLOOD PRESSURE: 78 MMHG | HEIGHT: 66 IN | SYSTOLIC BLOOD PRESSURE: 124 MMHG | RESPIRATION RATE: 20 BRPM | HEART RATE: 80 BPM | BODY MASS INDEX: 29.55 KG/M2 | WEIGHT: 183.86 LBS

## 2022-05-27 DIAGNOSIS — M79.89 LEG SWELLING: Primary | ICD-10-CM

## 2022-05-27 PROCEDURE — 99202 OFFICE O/P NEW SF 15 MIN: CPT | Performed by: NURSE PRACTITIONER

## 2022-05-27 PROCEDURE — 99211 OFF/OP EST MAY X REQ PHY/QHP: CPT

## 2022-05-27 NOTE — PROGRESS NOTES
[address]   Progress Note and Procedure Note      [address] RECORD NUMBER:  [id number]  AGE: 55 y.o. GENDER: female  : 1965  EPISODE DATE:  2022    Subjective:     Chief Complaint   Patient presents with    Wound Check     initial assessment right lower leg wound         HISTORY of PRESENT ILLNESS HPI     Violette Marc is a 55 y.o. female who presents today for wound/ulcer evaluation. History of Wound Context: Patient had a venous wound when she made the appointment, but it has since healed. Wound/Ulcer Pain Timing/Severity: none  Quality of pain: N/A  Severity:  0 / 10   Modifying Factors: Pain worsens with an increase in the amount of swelling in her lower legs  Associated Signs/Symptoms: edema    Ulcer Identification:  Ulcer Type: venous    Contributing Factors: edema    Acute Wound: N/A    PAST MEDICAL HISTORY        Diagnosis Date    [facility]     [facility]F (congestive heart failure) ([facility]        PAST SURGICAL HISTORY    History reviewed. No pertinent surgical history. FAMILY HISTORY    History reviewed. No pertinent family history. SOCIAL HISTORY    Social History     Tobacco Use    Smoking status: Former Smoker     Packs/day: 1.50     Years: 43.00     Pack years: 64.50     Quit date: 2022     Years since quittin.0    Smokeless tobacco: Never Used   Substance Use Topics    Alcohol use:  Yes     Alcohol/week: 1.0 - 3.0 standard drink     Types: 1 - 3 Cans of beer per week     Comment: 22 last drink    Drug use: Not on file       ALLERGIES    No Known Allergies    MEDICATIONS    Current Outpatient Medications on File Prior to Encounter   Medication Sig Dispense Refill    furosemide (LASIX) 80 MG tablet Take 1 tablet by mouth daily 30 tablet 3    atorvastatin (LIPITOR) 10 MG tablet Take 1 tablet by mouth daily 30 tablet 3    lisinopril (PRINIVIL;ZESTRIL) 5 MG tablet Take 1 tablet by mouth daily 30 tablet 3  metoprolol succinate (TOPROL XL) 25 MG extended release tablet Take 1 tablet by mouth daily 30 tablet 3     No current facility-administered medications on file prior to encounter. REVIEW OF SYSTEMS    Pertinent items are noted in HPI. Objective:      /78   Pulse 80   Temp (!) 96.3 °F (35.7 °C) (Infrared)   Resp 20   Ht 5' 6\" (1.676 m)   Wt 183 lb 13.8 oz (83.4 kg)   BMI 29.68 kg/m²     Wt Readings from Last 3 Encounters:   05/27/22 183 lb 13.8 oz (83.4 kg)   05/24/22 186 lb (84.4 kg)   05/18/22 209 lb 14.4 oz (95.2 kg)       PHYSICAL EXAM    General Appearance: alert and oriented to person, place and time, well developed and well- nourished, in no acute distress  Skin: warm and dry, no rash or erythema  Head: normocephalic and atraumatic  Eyes: pupils equal, round, and reactive to light, extraocular eye movements intact, conjunctivae normal  Neck: supple  Pulmonary/Chest: clear to auscultation bilaterally- no wheezes, rales or rhonchi, normal air movement, no respiratory distress  Breast:  Right lateral breast malignancy (appt with Dr. Merry Jean)  Cardiovascular: normal rate, regular rhythm, normal S1 and S2, no murmurs, rubs, clicks, or gallops, Right DP +1. Left DP +2, no carotid bruits  Extremities: no cyanosis or clubbing; +1 pitting edema  Musculoskeletal: normal range of motion, no joint swelling, deformity or tenderness  Neurologic: reflexes normal and symmetric, no cranial nerve deficit, gait, coordination and speech normal      Assessment:        Problem List Items Addressed This Visit     Leg swelling - Primary             Wound 05/08/22 Breast Lateral;Right (Active)   Wound Image    05/09/22 1120   Wound Etiology Other 05/17/22 2145   Dressing Status Clean;Dry; Intact 05/18/22 0800   Wound Cleansed Not Cleansed 05/17/22 2145   Dressing/Treatment Silicone border 11/61/11 0800   Dressing Change Due 05/18/22 05/17/22 2145   Wound Assessment Other (Comment) 05/18/22 0800   Drainage Amount Small 05/17/22 2145   Drainage Description Other (Comment) 05/17/22 2145   Odor None 05/18/22 0800   Jackie-wound Assessment Intact;Dry/flaky 05/18/22 0800   Margins Other (Comment) 05/17/22 2145   Number of days: 22       We are not seeing her for the malignancy, right lateral breast.  She has an appt with Dr. Olesya Preston. She had developed blisters on her RLE that opened into weeping ulcers. They have since healed. We discussed that compression would help prevent this from reoccurring. The patient was given a prescription for compression stockings, 20-30 mmHg, to be worn on a daily basis, off every night. For now, medium SpandaGrips were applied in the office today. The patient is aware that these only provide temporary compression. Plan:     Treatment Note please see attached Discharge Instructions    Written patient dismissal instructions given to patient and signed by patient or POA. Discharge Instructions           [address]  Physician Orders   [facility] OR[facility]OPED[facility] AND [facility] AT [place]  [address] [facility] [address], [facility] [address]  Telephone: [phone]    NAME:  George Cali  YOB: 1965  MEDICAL RECORD NUMBER:  [id number]  DATE:  5/27/2022    Congratulations! You have completed your treatment. Return to your Primary Care Physician for all your health issues. Resume your ordinary activities as tolerated. Take your medications as prescribed by your primary care physician. Check your skin daily for cracks, bruises, sores, or dryness. Use a moisturizer as needed. Clean and dry your skin, using mild soap and warm water (not hot). Avoid alcohol and caffeine and do not smoke. Maintain a nutritious diet. Avoid pressure on your wound site. Keep your legs elevated above the level of the heart whenever possible.     Apply lotion to bilateral lower legs      Physician Signature:______________________    Date: ___________ Time:  ____________    Damion Olson CNP            Electronically signed by Sean Dubois RN on 5/27/2022 at 11:03 AM                          Electronically signed by APRIL Elliott CNP on 5/27/2022 at 11:42 PM

## 2022-05-27 NOTE — LETTER
[address]  [address] OFF[address] CE[address] [facility]  [phone]  Dept: [phone]        Thank you Ms. Michoacano Wen for choosing [facility] for your Wound Care needs. We hope you found your first visit both encouraging and educational.  We look forward to serving you throughout the healing process. Before your next visit please review the information you received in your [employer]. The first visit can be overwhelming and this is a useful tool to refresh what information you may have been given. If you find yourself with any questions prior to your upcoming appointment, please feel free to contact us. Often wounds can be challenging and it is our goal to see you through the healing process with as much support possible. Again, thank you for choosing [facility]!     Sincerely,      The Staff of [address] [place] [address] Pkwy and Hyperbaric Oxygen Therapy

## 2022-05-27 NOTE — LETTER
[address]  [address] OFFICE [place] 2 [facility]  [phone]  Dept: [phone]   TODAYS DATE: 5/24/2022    [address] [place] [address],4Th Floor Wound Care   Appointment Treatment Guidelines  Welcome Ms. Chel Ross to the [address] [place] [address] Pkwy. We appreciate the confidence you have shown in choosing us as your wound care provider. Our goal is to heal your wound(s) as quickly as possible. Please read the items below regarding the nature of your appointments. 1. We will make every effort to schedule appointments that are convenient for you. Certain days and times may not be available, depending on your providers office hours and details of your care. 2. Patients will not necessarily be brought to an exam room in the order in which they arrive. Many providers work out of this office and patients are here for different procedures. Our goal is to serve you as quickly as possible. 3. We acknowledge that your time is valuable. Please remember that wound healing takes time and we appreciate your understanding that the length of each patients appointment will vary depending upon their need. 4. It is for your protection that we ask for insurance cards, photo ID, and new consent forms on your first visit and periodically throughout your treatment at all our facilities. 5. Wound Care treatment is known to be most effective when provided on a regular basis. Missed appointments, and not following the recommended plan of care can result in ineffective treatment and a poor outcome. If you find it difficult to keep appointments or to follow the recommended plan of care, it is your responsibility to let the staff know, so that we can work with you toward a solution. 6. If you need to miss an appointment, please call to let us know. We expect 24 hours notice for all cancellations.  We also expect missed visits to be rescheduled as soon as possible, preferably within the same week to promote the most effective healing time for your wound(s). 7. If you will be late for an appointment, please call our center to be sure that the provider can still see you when you arrive. If you are more than 15 minutes late your appointment may need to be rescheduled. 8. If two (2) appointments are missed without notifying us, your care plan may be discontinued. The same may happen if multiple visits are cancelled or rescheduled, even with notice. A missed visit is time when another patient, who also needs care, could have been seen. Thank you for your understanding and consideration.

## 2022-06-03 ENCOUNTER — TELEPHONE (OUTPATIENT)
Dept: CARDIOLOGY CLINIC | Age: 57
End: 2022-06-03

## 2022-06-03 ENCOUNTER — TELEPHONE (OUTPATIENT)
Dept: PHARMACY | Age: 57
End: 2022-06-03

## 2022-06-03 NOTE — TELEPHONE ENCOUNTER
Missed 5/31/22 appt for heart failure services. Called and left a message to please return my call to reschedule and especially call if any questions or concerns.      Nicolasa Early, PharmD  [facility]  Heart Failure Service  [phone]

## 2022-06-03 NOTE — TELEPHONE ENCOUNTER
[address] [place] [address] faxed request for ICD code. Code attached. Faxed back to [address] [place] [address]. Fax confirmation rec'd. Scanned into Epic.

## 2022-06-06 ENCOUNTER — OFFICE VISIT (OUTPATIENT)
Dept: PULMONOLOGY | Age: 57
End: 2022-06-06
Payer: MEDICARE

## 2022-06-06 ENCOUNTER — OFFICE VISIT (OUTPATIENT)
Dept: BREAST CENTER | Age: 57
End: 2022-06-06
Payer: MEDICARE

## 2022-06-06 ENCOUNTER — TELEPHONE (OUTPATIENT)
Dept: BREAST CENTER | Age: 57
End: 2022-06-06

## 2022-06-06 VITALS
RESPIRATION RATE: 16 BRPM | DIASTOLIC BLOOD PRESSURE: 80 MMHG | HEIGHT: 66 IN | TEMPERATURE: 97.6 F | OXYGEN SATURATION: 100 % | SYSTOLIC BLOOD PRESSURE: 120 MMHG | BODY MASS INDEX: 29.73 KG/M2 | HEART RATE: 77 BPM | WEIGHT: 185 LBS

## 2022-06-06 VITALS
WEIGHT: 180.2 LBS | HEART RATE: 96 BPM | SYSTOLIC BLOOD PRESSURE: 146 MMHG | DIASTOLIC BLOOD PRESSURE: 80 MMHG | BODY MASS INDEX: 28.96 KG/M2 | HEIGHT: 66 IN

## 2022-06-06 DIAGNOSIS — J96.01 ACUTE RESPIRATORY FAILURE WITH HYPOXIA (HCC): ICD-10-CM

## 2022-06-06 DIAGNOSIS — Z12.31 VISIT FOR SCREENING MAMMOGRAM: ICD-10-CM

## 2022-06-06 DIAGNOSIS — D48.61 BORDERLINE FINDING OF PHYLLODES NEOPLASM OF BREAST, RIGHT: Primary | ICD-10-CM

## 2022-06-06 PROCEDURE — 1036F TOBACCO NON-USER: CPT | Performed by: SURGERY

## 2022-06-06 PROCEDURE — 99213 OFFICE O/P EST LOW 20 MIN: CPT | Performed by: INTERNAL MEDICINE

## 2022-06-06 PROCEDURE — 1036F TOBACCO NON-USER: CPT | Performed by: INTERNAL MEDICINE

## 2022-06-06 PROCEDURE — 3017F COLORECTAL CA SCREEN DOC REV: CPT | Performed by: INTERNAL MEDICINE

## 2022-06-06 PROCEDURE — 1111F DSCHRG MED/CURRENT MED MERGE: CPT | Performed by: INTERNAL MEDICINE

## 2022-06-06 PROCEDURE — 99215 OFFICE O/P EST HI 40 MIN: CPT | Performed by: SURGERY

## 2022-06-06 PROCEDURE — G8427 DOCREV CUR MEDS BY ELIG CLIN: HCPCS | Performed by: INTERNAL MEDICINE

## 2022-06-06 PROCEDURE — 3017F COLORECTAL CA SCREEN DOC REV: CPT | Performed by: SURGERY

## 2022-06-06 PROCEDURE — G8417 CALC BMI ABV UP PARAM F/U: HCPCS | Performed by: SURGERY

## 2022-06-06 PROCEDURE — G8417 CALC BMI ABV UP PARAM F/U: HCPCS | Performed by: INTERNAL MEDICINE

## 2022-06-06 PROCEDURE — G8427 DOCREV CUR MEDS BY ELIG CLIN: HCPCS | Performed by: SURGERY

## 2022-06-06 PROCEDURE — 1111F DSCHRG MED/CURRENT MED MERGE: CPT | Performed by: SURGERY

## 2022-06-06 NOTE — PATIENT INSTRUCTIONS
Patient to be scheduled to have right breast mastectomy  Patient to be scheduled for left breast screening mammogram  Surgical procedure was explained in detail  General anesthesia with possible overnight stay in hospital   Risks and benefits of surgery were explained  Consent for surgery was signed  Patient to be scheduled 7/7/22

## 2022-06-06 NOTE — ASSESSMENT & PLAN NOTE
Hypoxemia secondary to pulmonary edema secondary to fluid overload. She has diuresed significantly since hospitalization. Has been weaned off oxygen 97% on room air. Therefore, order will be sent to discontinue oxygen. She will follow-up as needed.     She is planning to have breast surgery soon and is cleared from a pulmonary standpoint for breast cancer surgery

## 2022-06-06 NOTE — TELEPHONE ENCOUNTER
Breast History:  History of Previous Breast Biopsy:yes  Self Breast Exams Completed:no-  Family History of Breast Cancer:yes-  Paternal grandmother breast cancer     Family History of Other Cancers:yes-  Paternal grandfather leukemia   Ashkenazi Hinduism Decent:no  Bra Size: 43 D    [de-identified] History:  : 2,   Para: 2  Age of Menarche: 16 years  Age of Menopause: 53 years   Age of first live Birth: 22 years  History of Hysterectomy / CARLA-BSO: no  History of OCP's/HRT:No   When and how long:    Family History or personal history of Ovarian Cancer: no

## 2022-06-06 NOTE — PROGRESS NOTES
Subjective:      Patient ID: Pavel Alcantara is a 55 y.o. female. HPI   Chief Complaint   Patient presents with   [facility] Consult     Patient presents for right breast cancer     Patient was hospitalized last month with CHF. Underwent significant diuresis and is now doing much better, with O2 sats 97% on RA. In the hospital, she was noted to have a large right breast mass present for several years. Intermittent bleeding, no pain. Chest CT showed the large breast mass and axillary node enlargement. 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 Here to discuss surgery. She has not smoked since her hospitalization. Breast History:  History of Previous Breast Biopsy:yes  Self Breast Exams Completed:no-  Family History of Breast Cancer:yes-  Paternal grandmother breast cancer     Family History of Other Cancers:yes-  Paternal grandfather leukemia   Ashkenazi Religion Decent:no  Bra Size: 43 D     Gyne History:  : 2,   Para: 2  Age of Menarche: 16 years  Age of Menopause: 53 years   Age of first live Birth: 22 years  History of Hysterectomy / CARLA-BSO: no  History of OCP's/HRT:No   When and how long:    Family History or personal history of Ovarian Cancer: no        Past Medical History:   Diagnosis Date    [facility] ([facility]     CHF (congestive heart failure) ([facility] [facility]        No past surgical history on file. Current Outpatient Medications   Medication Sig Dispense Refill    furosemide (LASIX) 80 MG tablet Take 1 tablet by mouth daily 30 tablet 3    atorvastatin (LIPITOR) 10 MG tablet Take 1 tablet by mouth daily 30 tablet 3    lisinopril (PRINIVIL;ZESTRIL) 5 MG tablet Take 1 tablet by mouth daily 30 tablet 3    metoprolol succinate (TOPROL XL) 25 MG extended release tablet Take 1 tablet by mouth daily 30 tablet 3     No current facility-administered medications for this visit.        Social History     Socioeconomic History    Marital status:      Spouse name: Not on file    Number of children: Not on file    Years of education: Not on file    Highest education level: Not on file   Occupational History    Not on file   Tobacco Use    Smoking status: Former Smoker     Packs/day: 1.50     Years: 43.00     Pack years: 56.50     Quit date: 2022     Years since quittin.0    Smokeless tobacco: Never Used   Substance and Sexual Activity    Alcohol use: Yes     Alcohol/week: 1.0 - 3.0 standard drink     Types: 1 - 3 Cans of beer per week     Comment: 22 last drink    Drug use: Not on file    Sexual activity: Not on file   Other Topics Concern    Not on file   Social History Narrative    Not on file     Social Determinants of Health     Financial Resource Strain:     Difficulty of Paying Living Expenses: Not on file   Food Insecurity:     Worried About Running Out of Food in the Last Year: Not on file    Partha of Food in the Last Year: Not on file   Transportation Needs:     Lack of Transportation (Medical): Not on file    Lack of Transportation (Non-Medical):  Not on file   Physical Activity:     Days of Exercise per Week: Not on file    Minutes of Exercise per Session: Not on file   Stress:     Feeling of Stress : Not on file   Social Connections:     Frequency of Communication with Friends and Family: Not on file    Frequency of Social Gatherings with Friends and Family: Not on file    Attends Restorationism Services: Not on file    Active Member of [address] [place] [address] or Organizations: Not on file    Attends Club or Organization Meetings: Not on file    Marital Status: Not on file   Intimate Partner Violence:     Fear of Current or Ex-Partner: Not on file    Emotionally Abused: Not on file    Physically Abused: Not on file    Sexually Abused: Not on file   Housing Stability:     Unable to Pay for Housing in the Last Year: Not on file    Number of [place] in the Last Year: Not on file    Unstable Housing in the Last Year: Not on file Objective:   Physical Exam    Right breast - dressing intact. Please refer to prior note for description and picture of tumor. Left breast - Normal contour, no masses, no nipple or skin changes. No cervical or axillary adenopathy. Assessment:      Diagnosis Orders   1. 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 Visit for screening mammogram  DARREN MIRLANDE DIGITAL SCREEN UNI LEFT          Plan:     Discussed breast biopsy results with patient. Reviewed surgical options, including lumpectomy, mastectomy, and mastectomy with reconstruction. Also reviewed the sentinal node procedure, as node spread with phyllodes tumor is rare but still possible. Recommendations were made following standard NCCN guidelines. 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 She does not desire reconstruction. The indications for the planned procedure, along with the potential benefits and risks which include but are not limited to the risk of anesthesia, bleeding, infection, possible failed operation, possible need for additional surgery pending final pathologic assessment, nipple loss, lymphedema, sensation changes, nerve injury, persistent pain, and unappealing cosmetics were reviewed. The discussion I have done encompasses risks, benefits, and side effects related to the alternatives and the risks related to not receiving the proposed care, treatment, and services. All questions were answered and she agrees to proceed. Will obtain left screening mammogram prior to surgery. Order placed. On this date 06/06/22 I have spent 40 minutes reviewing previous notes, test results, and face to face with the patient discussing the diagnosis and importance of compliance with the treatment plan as well as documenting on the day of the visit.      Electronically signed by Kiah Carrillo MD on 6/6/2022 at 12:19 PM

## 2022-06-06 NOTE — LETTER
CONSENT TO OPERATION  AND/OR OTHER PROCEDURE(S)          PATIENT : Laura Mireles   YOB: 1965      DATE : 6/6/22          1. I request and consent that Dr. Prakash Diaz,  and/or his associates or assistants perform an operation and/or procedures on the above patient at  [facility] [place], to treat the condition(s) which appear indicated by the diagnostic studies already performed. I have been told that in general terms the nature, purpose and reasonable expectations of the operation and/or procedure(s) are:       Right breast mastectomy with right axillary sentinel node biopsy with radioisotope        2. It has been explained to me by the informing physician that during the course of the operation and/or procedure(s) unforeseen conditions may be revealed that necessitate an extension of the original operation and/or procedure(s) or different operation and/or procedures than those set forth in Paragraph 1. I therefore authorize and request that my physician and/or his associates or assistants perform such operations and/or procedures as are necessary and desirable in the exercise of professional judgment. The authority granted under this Paragraph 2 shall extend to all conditions that require treatment and are known to my physician at the time the operation is commenced. 3. I have been made aware by the informing physician of certain risks and consequences that are associated with the operation and/or procedure(s) described in Paragraph 1, the reasonable alternative methods or treatment, the possible consequences, the possibility that the operation and/or procedure(s) may be unsuccessful and the possibility of complications.   I understand the reasonably known risks to be:      - Bleeding  - Infection/COVID 19  - Poor Healing  - Possible Damage to Nerve, Vessel, Tendon/Muscle or Bone  - Possible need for additional Treatment/Surgery/Re Excision  - Stiffness  - Persistent Pain - Residual or Recurrent Symptoms  -   -   - Anesthetic and/or Medical Risks  - Possible failed operation  - Lymphedema/swelling  - Nipple loss  - Numbness or nerve injury  - Unappealing cosmetics              4. I have also been informed by the informing physician that there are other risks from both known and unknown causes that are attendant to the performance of any surgical procedure. I am aware that the practice of medicine and surgery is not an exact science, and that no guarantees have been made to me concerning the results of the operation and/or procedure(s). 5. I   CONSENT / REFUSE CONSENT  (strike the phrase that does not apply) to the taking of photographs before, during and/or after the operation or procedure for scientific/educational purposes. 6. I consent to the administration of anesthesia and to the use of such anesthetics as may be deemed advisable by the anesthesiologist who has been engaged by me or my physician. 7. I certify that I have read and understand the above consent to operation and/or other procedure(s); that the explanations therein referred to were made to me by the informing physician in advance of my signing this consent; that all blanks or statements requiring insertion or completion were filled in and inapplicable paragraphs, if any, were stricken before I signed; and that all questions asked by me about the operation and/or procedure(s) which I have consented to have been fully answered in a satisfactory manner.               _______________________  Witness        Signature Of Patient          _______________________  Informing Physician         Signature of Informing Physician           If patient is unable to sign or is a minor, complete one of the following:    (A)  Patient is a minor   years of age.     (B)  Patient is unable to sign because:

## 2022-06-06 NOTE — PROGRESS NOTES
REASON FOR CONSULTATION/CC:   Chief Complaint   Patient presents with    Follow-Up from Hospital     hypoxia          HISTORY OF PRESENT ILLNESS: Aldean Lombard is a 55y.o. year old female \  Chronic hypoxemia   Now off o2 at 97%  Will d/c       [employer]. Tobacco abuse  Quit with admission. Objective:   PHYSICAL EXAM:  Blood pressure 120/80, pulse 77, temperature 97.6 °F (36.4 °C), temperature source Infrared, resp. rate 16, height 5' 6\" (1.676 m), weight 185 lb (83.9 kg), SpO2 100 %.'  Gen: No distress. Eyes: PERRL. No sclera icterus. No conjunctival injection. ENT: No discharge. Pharynx clear. External appearance of ears and nose normal.  Neck: Trachea midline. No obvious mass. Resp: No accessory muscle use. No crackles. No wheezes. No rhonchi. CV: Regular rate. Regular rhythm. No murmur or rub. No edema. GI:    Skin: Warm, dry, normal texture and turgor. No nodule on exposed extremities. Lymph: No cervical LAD. No supraclavicular LAD. M/S: No cyanosis. No clubbing. No joint deformity. Neuro: Moves all four extremities. Psych: Oriented x 3. No anxiety. Awake. Alert. Intact judgement and insight. Current Outpatient Medications   Medication Sig Dispense Refill    furosemide (LASIX) 80 MG tablet Take 1 tablet by mouth daily 30 tablet 3    atorvastatin (LIPITOR) 10 MG tablet Take 1 tablet by mouth daily 30 tablet 3    lisinopril (PRINIVIL;ZESTRIL) 5 MG tablet Take 1 tablet by mouth daily 30 tablet 3    metoprolol succinate (TOPROL XL) 25 MG extended release tablet Take 1 tablet by mouth daily 30 tablet 3     No current facility-administered medications for this visit.          Data Reviewed:        Assessment:     History of acute pulmonary edema with hypoxemia  History of diastolic heart failure      Plan:      Problem List Items Addressed This Visit     Acute respiratory failure with hypoxia (HCC)      Hypoxemia secondary to pulmonary edema secondary to fluid overload. She has diuresed significantly since hospitalization. Has been weaned off oxygen 97% on room air. Therefore, order will be sent to discontinue oxygen. She will follow-up as needed. She is planning to have breast surgery soon and is cleared from a pulmonary standpoint for breast cancer surgery                     This note was transcribed using [id number] [employer]. Please disregard any translational errors.     [facility]ie [phone]

## 2022-06-08 ENCOUNTER — TELEPHONE (OUTPATIENT)
Dept: PHARMACY | Age: 57
End: 2022-06-08

## 2022-06-08 NOTE — TELEPHONE ENCOUNTER
Missed 5/31/22 appt for heart failure services. Called and left a message with her mother to please return my call to reschedule.     Elly Oliver, PharmD  [address] [place] [address]  Heart Failure Service  [phone]

## 2022-06-10 ENCOUNTER — TELEPHONE (OUTPATIENT)
Dept: SURGERY | Age: 57
End: 2022-06-10

## 2022-06-10 NOTE — TELEPHONE ENCOUNTER
Patient called in said she is still needing an order sent to [facility] for her health supplies. She said they can be reached at [phone] and insurance has already approved this.  She could not remember sizes of what she needed but said that information was given to [facility] already

## 2022-06-13 NOTE — TELEPHONE ENCOUNTER
I spoke with Dina Westfall at [facility]. She needs to know how many wounds she needs supplies for and the sizes of the wounds for the right breast.  The order needs to be faxed to [phone]. Please advise. Thank you.

## 2022-06-13 NOTE — TELEPHONE ENCOUNTER
Spoke with [facility] and according to them, they have everything they need to have the supplies sent to patient.

## 2022-06-14 ENCOUNTER — TELEPHONE (OUTPATIENT)
Dept: SURGERY | Age: 57
End: 2022-06-14

## 2022-06-14 ENCOUNTER — TELEPHONE (OUTPATIENT)
Dept: PHARMACY | Age: 57
End: 2022-06-14

## 2022-06-14 DIAGNOSIS — D48.61 BORDERLINE FINDING OF PHYLLODES NEOPLASM OF BREAST, RIGHT: Primary | ICD-10-CM

## 2022-06-14 NOTE — TELEPHONE ENCOUNTER
Missed 5/31/22 appt for heart failure services. Called and left a message to please return my call to reschedule.     Cris Meneses, PharmD  [facility]  Heart Failure Service  [phone]

## 2022-06-14 NOTE — TELEPHONE ENCOUNTER
Left a message to return my call. I scheduled her post op on 7/25/22 @ [phone]. Please verify that's a good date and time.        Thanks, Jane Elizalde

## 2022-06-17 NOTE — TELEPHONE ENCOUNTER
Patient called back and I informed her of her post op appointment on 07/25/22 @ 12:45pm in [place] with Dr. Melinda Woodard. This date and time is good for this patient.

## 2022-06-21 ENCOUNTER — HOSPITAL ENCOUNTER (OUTPATIENT)
Dept: PET IMAGING | Age: 57
Discharge: HOME | End: 2022-06-21
Payer: MEDICARE

## 2022-06-21 ENCOUNTER — TELEPHONE (OUTPATIENT)
Dept: PHARMACY | Age: 57
End: 2022-06-21

## 2022-06-21 DIAGNOSIS — C50.919 MALIGNANT NEOPLASM OF FEMALE BREAST, UNSPECIFIED ESTROGEN RECEPTOR STATUS, UNSPECIFIED LATERALITY, UNSPECIFIED SITE OF BREAST (HCC): ICD-10-CM

## 2022-06-21 PROCEDURE — 3430000000 HC RX DIAGNOSTIC RADIOPHARMACEUTICAL: Performed by: INTERNAL MEDICINE

## 2022-06-21 PROCEDURE — A9552 F18 FDG: HCPCS | Performed by: INTERNAL MEDICINE

## 2022-06-21 PROCEDURE — 78815 PET IMAGE W/CT SKULL-THIGH: CPT

## 2022-06-21 RX ORDER — FLUDEOXYGLUCOSE F 18 200 MCI/ML
12.53 INJECTION, SOLUTION INTRAVENOUS
Status: COMPLETED | OUTPATIENT
Start: 2022-06-21 | End: 2022-06-21

## 2022-06-21 RX ADMIN — FLUDEOXYGLUCOSE F 18 12.53 MILLICURIE: 200 INJECTION, SOLUTION INTRAVENOUS at 10:23

## 2022-06-21 NOTE — TELEPHONE ENCOUNTER
Heart Failure referral.    Missed 5/31/22 appt. Called and left a message to please return my call to reschedule. I will go ahead and close her referral. We have made several attempts to reach her. She is welcome to reach out to us any time.      Zaheer Velez, PharmD  [facility]  Heart Failure Service  [phone]

## 2022-06-22 NOTE — PROGRESS NOTES
[facility]      Cardiology Consult    Enzo Orlando  1965 June 24, 2022    Referring Physician: APRIL Cummings CNP  Reason for Referral: CHF    CC: \"weight is stable\"     HPI:  The patient is 55 y.o. female with a past medical history significant for diastolic heart failure, breast cancer, and hypertension who presents today for management of heart failure. She presented to the hospital with complaints of worsening lower extremity edema and shortness of breath. She did not routinely follow with a physician. She noted lower extremity edema for the past several years but did not seek medical attention. The swelling would wax and wane in intensity but would become so severe at times that she could not bend her knees or move her ankles. She would have skin breakdown at times on her lower extremities. However over a few weeks, she had been having worsening shortness of breath and PND/orthopnea. [facility] was also consulted as she had a large fungating right breast mass. She first noticed 2 \"marble sized\" masses in her right breast approximately 10 years ago. For over 1 year, the mass on her chest would open up periodically and bleed but she never sought medical attention. She was started on IV Lasix and diuresed approximately 60 pounds prior to discharge and has lost more than 80 pounds total. Pulmonary was also consulted as the patient had hypercapnic respiratory failure requiring BiPAP. She was discharged home with supplemental oxygen. Today, she states overall she is doing well. She is accompanied by her . She denies new cardiac sounds complaints. Her weight has remained stable over the past two weeks. She is scheduled for breast surgery on 7/9/22. Patient denies exertional chest pain/pressure, dyspnea at rest, worsening WALTERS, PND, orthopnea, palpitations, lightheadedness, weight changes, changes in LE edema, and syncope.  She reports medication compliance and is tolerating. Past Medical History:   Diagnosis Date    [facility] ([facility]     [facility]      History reviewed. No pertinent surgical history. History reviewed. No pertinent family history. Social History     Tobacco Use    Smoking status: Former Smoker     Packs/day: 1.50     Years: 43.00     Pack years: 64.50     Quit date: 2022     Years since quittin.1    Smokeless tobacco: Never Used   Substance Use Topics    Alcohol use: Yes     Alcohol/week: 1.0 - 3.0 standard drink     Types: 1 - 3 Cans of beer per week     Comment: 22 last drink    Drug use: Not on file       No Known Allergies  Current Outpatient Medications   Medication Sig Dispense Refill    furosemide (LASIX) 80 MG tablet Take 1 tablet by mouth daily 30 tablet 3    atorvastatin (LIPITOR) 10 MG tablet Take 1 tablet by mouth daily 30 tablet 3    lisinopril (PRINIVIL;ZESTRIL) 5 MG tablet Take 1 tablet by mouth daily 30 tablet 3    metoprolol succinate (TOPROL XL) 25 MG extended release tablet Take 1 tablet by mouth daily 30 tablet 3     No current facility-administered medications for this visit. Review of Systems:  · Constitutional: No unanticipated weight loss. There's been no change in energy level, sleep pattern, or activity level. No fevers, chills. · Eyes: No visual changes or diplopia. No scleral icterus. · ENT: No Headaches, hearing loss or vertigo. No mouth sores or sore throat. · Cardiovascular: as reviewed in HPI  · Respiratory: No cough or wheezing, no sputum production. No hemoptysis. · Gastrointestinal: No abdominal pain, appetite loss, blood in stools. No change in bowel or bladder habits. · Genitourinary: No dysuria, trouble voiding, or hematuria. · Musculoskeletal:  No gait disturbance, no joint complaints. · Integumentary: No rash or pruritis. · Neurological: No headache, diplopia, change in muscle strength, numbness or tingling.    · Psychiatric: No anxiety or depression. · Endocrine: No temperature intolerance. No excessive thirst, fluid intake, or urination. No tremor. · Hematologic/Lymphatic: No abnormal bruising or bleeding, blood clots or swollen lymph nodes. · Allergic/Immunologic: No nasal congestion or hives. Physical Exam:   BP (!) 140/84   Pulse 68   Ht 5' 6\" (1.676 m)   Wt 180 lb 6.4 oz (81.8 kg)   SpO2 98%   BMI 29.12 kg/m²   Wt Readings from Last 3 Encounters:   06/24/22 180 lb 6.4 oz (81.8 kg)   06/06/22 180 lb 3.2 oz (81.7 kg)   06/06/22 185 lb (83.9 kg)     Constitutional: She is oriented to person, place, and time. She appears well-developed and well-nourished. In no acute distress. Head: Normocephalic and atraumatic. Pupils equal and round. Neck: Neck supple. No JVP or carotid bruit appreciated. No mass and no thyromegaly present. No lymphadenopathy present. Cardiovascular: Normal rate. Normal heart sounds. Exam reveals no gallop and no friction rub. No murmur heard. Pulmonary/Chest: Effort normal and breath sounds normal. No respiratory distress. She has no wheezes, rhonchi or rales. Fungating right breast mass. Abdominal: Soft, non-tender. Bowel sounds are normal. She exhibits no organomegaly, mass or bruit. Extremities: Trace ankle edema. No cyanosis or clubbing. Pulses are 2+ radial/carotid bilaterally. Neurological: No gross cranial nerve deficit. Coordination normal.   Skin: Skin is warm and dry. There is no rash or diaphoresis. Psychiatric: She has a normal mood and affect.  Her speech is normal and behavior is normal.     Lab Review:   FLP:    Lab Results   Component Value Date    TRIG 90 05/09/2022    HDL 26 05/09/2022    LDLCALC 67 05/09/2022    LABVLDL 18 05/09/2022     BUN/Creatinine:    Lab Results   Component Value Date    BUN 19 05/24/2022    CREATININE 0.9 05/24/2022     EKG Interpretation: 6/24/22 Sinus rhythm right axis deviation, incomplete RBBB    Image Review:     Echo 5/11/22   Left ventricular cavity size is normal.  Normal left ventricular wall thickness. Ejection fraction is visually estimated to be 55-60%. No regional wall motion abnormalities are noted. Indeterminate diastolic function. Measurement attempted while definity was administered. right ventricular appears mildly enlarged with possible mildly reduced Rv function  Unable to obtain doppler, M-Mode and RV TDI  Right ventricular septum flattened in systole and diastole consistent with  RV pressure and volume overload. Mild pulmonic regurgitation present. Assessment/Plan:  1) Chronic diastolic heart failure. EF 55-60%. NYHA II. Pt appears euvolemic today and her weight is down >80lbs. Current weight 180 pounds. Continue with medical therapy including ACE-I, B-blocker, and statin. Encouraged a low sodium diet and daily weight. Continue lasix 80 mg daily. Check BMP, BNP and CBC prior to surgery.    2) Chronic hypoxic and hypercapnic respiratory failure. Pulmonary appointment scheduled and encouraged follow up.       3) Breast cancer. Management per [facility] and surgery.    4) CAD risk equivalent with type 2 diabetes mellitus/aortic atherosclerosis. Diabetes management per primary care physician; encouraged follow up. Continue statin therapy.    5) Nicotine Addiction. Encouraged continued smoking cessation. 6) Iron deficiency anemia. Ferritin was 17. Received IV iron in the hospital but defer additional work-up/treatment to primary care physician and [facility]. 7) Pre-operative risk assessment. Patient is intermediate cardiac risk based on RCRI score of 1 (CHF). Patient's risk should not preclude her from proceeding with surgery. Suggest continuation of B-blocker and statin in the arnav-operative period. Follow up in 6 months     Thank you very much for allowing me to participate in the care of your patient. Please do not hesitate to contact me if you have any questions. Sincerely,  Soraida Peña.  Ailyn Jesus, [address] [facility] 139, [address] Pending [facility] to [facility] [place] [address]  Ph: [phone]  Fax: [phone]    This note was scribed in the presence of Marianne Powell MD by General Murillo, RN. Physician Attestation: The scribes documentation has been prepared under my direction and personally reviewed by me in its entirety. I confirm that the note above accurately reflects all work, treatment, procedures, and medical decision making performed by me. All portions of the note including but not limited to the chief complaint, history of present illness, physical exam, assessment and plan/medical decision making were personally reviewed, edited, and updated on the day of the visit.

## 2022-06-24 ENCOUNTER — OFFICE VISIT (OUTPATIENT)
Dept: CARDIOLOGY CLINIC | Age: 57
End: 2022-06-24
Payer: MEDICARE

## 2022-06-24 VITALS
WEIGHT: 180.4 LBS | OXYGEN SATURATION: 98 % | SYSTOLIC BLOOD PRESSURE: 140 MMHG | DIASTOLIC BLOOD PRESSURE: 84 MMHG | BODY MASS INDEX: 28.99 KG/M2 | HEIGHT: 66 IN | HEART RATE: 68 BPM

## 2022-06-24 DIAGNOSIS — I50.32 CHRONIC DIASTOLIC HEART FAILURE (HCC): Primary | ICD-10-CM

## 2022-06-24 DIAGNOSIS — D50.9 IRON DEFICIENCY ANEMIA, UNSPECIFIED IRON DEFICIENCY ANEMIA TYPE: ICD-10-CM

## 2022-06-24 DIAGNOSIS — C80.1 MALIGNANCY (HCC): ICD-10-CM

## 2022-06-24 DIAGNOSIS — J96.11 CHRONIC RESPIRATORY FAILURE WITH HYPOXIA (HCC): ICD-10-CM

## 2022-06-24 PROCEDURE — 3017F COLORECTAL CA SCREEN DOC REV: CPT | Performed by: INTERNAL MEDICINE

## 2022-06-24 PROCEDURE — 1036F TOBACCO NON-USER: CPT | Performed by: INTERNAL MEDICINE

## 2022-06-24 PROCEDURE — G8417 CALC BMI ABV UP PARAM F/U: HCPCS | Performed by: INTERNAL MEDICINE

## 2022-06-24 PROCEDURE — G8427 DOCREV CUR MEDS BY ELIG CLIN: HCPCS | Performed by: INTERNAL MEDICINE

## 2022-06-24 PROCEDURE — 93000 ELECTROCARDIOGRAM COMPLETE: CPT | Performed by: INTERNAL MEDICINE

## 2022-06-24 PROCEDURE — 99214 OFFICE O/P EST MOD 30 MIN: CPT | Performed by: INTERNAL MEDICINE

## 2022-06-24 NOTE — PATIENT INSTRUCTIONS
Patient Education        Heart-Healthy Diet: Care Instructions  Your Care Instructions     A heart-healthy diet has lots of vegetables, fruits, nuts, beans, and whole grains, and is low in salt. It limits foods that are high in saturated fat, such as meats, cheeses, and fried foods. It may be hard to change your diet,but even small changes can lower your risk of heart attack and heart disease. Follow-up care is a key part of your treatment and safety. Be sure to make and go to all appointments, and call your doctor if you are having problems. It's also a good idea to know your test results and keep alist of the medicines you take. How can you care for yourself at home? Watch your portions   Use food labels to learn what the recommended servings are for the foods you eat.  Eat only the number of calories you need to stay at a healthy weight. If you need to lose weight, eat fewer calories than your body burns (through exercise and other physical activity). Eat more fruits and vegetables   Eat a variety of fruit and vegetables every day. Dark green, deep orange, red, or yellow fruits and vegetables are especially good for you. Examples include spinach, carrots, peaches, and berries.  Keep carrots, celery, and other veggies handy for snacks. Buy fruit that is in season and store it where you can see it so that you will be tempted to eat it.  Cook dishes that have a lot of veggies in them, such as stir-fries and soups. Limit saturated fat   Read food labels, and try to avoid saturated fats. They increase your risk of heart disease.  Use olive or canola oil when you cook.  Bake, broil, grill, or steam foods instead of frying them.  Choose lean meats instead of high-fat meats such as hot dogs and sausages. Cut off all visible fat when you prepare meat.  Eat fish, skinless poultry, and meat alternatives such as soy products instead of high-fat meats.  Soy products, such as tofu, may be especially good for your heart.  Choose low-fat or fat-free milk and dairy products. Eat foods high in fiber   Eat a variety of grain products every day. Include whole-grain foods that have lots of fiber and nutrients. Examples of whole-grain foods include oats, whole wheat bread, and brown rice.  Buy whole-grain breads and cereals, instead of white bread or pastries. Limit salt and sodium   Limit how much salt and sodium you eat to help lower your blood pressure.  Taste food before you salt it. Add only a little salt when you think you need it. With time, your taste buds will adjust to less salt.  Eat fewer snack items, fast foods, and other high-salt, processed foods. Check food labels for the amount of sodium in packaged foods.  Choose low-sodium versions of canned goods (such as soups, vegetables, and beans). Limit sugar   Limit drinks and foods with added sugar. These include candy, desserts, and soda pop. Limit alcohol   Limit alcohol to no more than 2 drinks a day for men and 1 drink a day for women. Too much alcohol can cause health problems. When should you call for help? Watch closely for changes in your health, and be sure to contact your doctor if:     You would like help planning heart-healthy meals. Where can you learn more? Go to https://[employer].healthOversee. org and sign in to your [employer] account. Enter V137 in the [facility] box to learn more about \"Heart-Healthy Diet: Care Instructions. \"     If you do not have an account, please click on the \"Sign Up Now\" link. Current as of: September 8, 2021               Content Version: 13.3  © 2006-2022 [employer]. Care instructions adapted under license by [employer]. If you have questions about a medical condition or this instruction, always ask your healthcare professional. [place] [address] any warranty or liability for your use of this information.

## 2022-06-30 NOTE — PROGRESS NOTES
DOS 2022   1965    Pt needs to have PCP/medical clear. instructed to her PCP to make appt. And call [facility] office with any problems. Pt does have cardiac clear. Update : ryan Ely at [facility] office pt is still working on h.p and will follow up with pt and fax h.p to us when complete. [phone]    UPDATE: SPOKE TO DR. Olive Roa OFFICE AND PER OFFICE TRINY IS AWARE AND STATES PATIENT WAS OUT OF TOWN AND PATIENT WAS GETTING HER H&P EITHER YESTERDAY OR TODAY. OFFICE STATES WILL SCAN H&P IN Epic WHEN IT IS RECEIVED    SPOKE WITH JAYCEE AT DR. Olive Roa OFFICE-PER JAYCEE PATIENT WAS SUPPOSED TO GO TO URGENT CARE FOR HER H&P-SHE WAS TOLD MULTIPLE TIMES SHE NEEDED-H&P-DR. BROWN AWARE NO H&P IN [facility] SO FAR-PER OFFICE DR. BROWN WILL ADDRESS H&P DOS

## 2022-07-01 ENCOUNTER — TELEPHONE (OUTPATIENT)
Dept: BREAST CENTER | Age: 57
End: 2022-07-01

## 2022-07-01 DIAGNOSIS — I50.32 CHRONIC DIASTOLIC HEART FAILURE (HCC): ICD-10-CM

## 2022-07-01 DIAGNOSIS — D50.9 IRON DEFICIENCY ANEMIA, UNSPECIFIED IRON DEFICIENCY ANEMIA TYPE: ICD-10-CM

## 2022-07-01 LAB
ANION GAP SERPL CALCULATED.3IONS-SCNC: 16 MMOL/L (ref 3–16)
BUN BLDV-MCNC: 10 MG/DL (ref 7–20)
CALCIUM SERPL-MCNC: 9.4 MG/DL (ref 8.3–10.6)
CHLORIDE BLD-SCNC: 100 MMOL/L (ref 99–110)
CO2: 29 MMOL/L (ref 21–32)
CREAT SERPL-MCNC: 0.9 MG/DL (ref 0.6–1.1)
GFR AFRICAN AMERICAN: >60
GFR NON-AFRICAN AMERICAN: >60
GLUCOSE BLD-MCNC: 162 MG/DL (ref 70–99)
HCT VFR BLD CALC: 36.6 % (ref 36–48)
HEMOGLOBIN: 12 G/DL (ref 12–16)
MCH RBC QN AUTO: 25.2 PG (ref 26–34)
MCHC RBC AUTO-ENTMCNC: 32.8 G/DL (ref 31–36)
MCV RBC AUTO: 77 FL (ref 80–100)
PDW BLD-RTO: 21.3 % (ref 12.4–15.4)
PLATELET # BLD: 241 K/UL (ref 135–450)
PMV BLD AUTO: 8.1 FL (ref 5–10.5)
POTASSIUM SERPL-SCNC: 2.8 MMOL/L (ref 3.5–5.1)
PRO-BNP: 697 PG/ML (ref 0–124)
RBC # BLD: 4.75 M/UL (ref 4–5.2)
SODIUM BLD-SCNC: 145 MMOL/L (ref 136–145)
WBC # BLD: 8.2 K/UL (ref 4–11)

## 2022-07-01 NOTE — TELEPHONE ENCOUNTER
Spoke to patient , discussed going to urgent care to have a H&P obtained. States she spoke to PAT. NPO after midnight the day of surgery. May take medications with a sip of water the morning of surgery. I emailed her a copy of H&P form and her surgery letter. Encouraged to call with any questions or concerns.        Thanks, Tarun Bernal

## 2022-07-05 RX ORDER — POTASSIUM CHLORIDE 750 MG/1
TABLET, EXTENDED RELEASE ORAL
Qty: 37 TABLET | Refills: 2 | Status: SHIPPED | OUTPATIENT
Start: 2022-07-05 | End: 2022-07-12 | Stop reason: SDUPTHER

## 2022-07-06 ENCOUNTER — ANESTHESIA EVENT (OUTPATIENT)
Dept: OPERATING ROOM | Age: 57
End: 2022-07-06
Payer: MEDICARE

## 2022-07-06 ENCOUNTER — TELEPHONE (OUTPATIENT)
Dept: BREAST CENTER | Age: 57
End: 2022-07-06

## 2022-07-06 NOTE — TELEPHONE ENCOUNTER
Patient called said she has went to 2 different urgent care facilities, she can not get a Pre-Op physical for surgery tomorrow. Patient was instructed to call other Urgent Care facilities ask if they will do History & Physical- Pre-Op.

## 2022-07-07 ENCOUNTER — HOSPITAL ENCOUNTER (OUTPATIENT)
Dept: NUCLEAR MEDICINE | Age: 57
Discharge: HOME | End: 2022-07-07
Attending: SURGERY
Payer: MEDICARE

## 2022-07-07 ENCOUNTER — ANESTHESIA (OUTPATIENT)
Dept: OPERATING ROOM | Age: 57
End: 2022-07-07
Payer: MEDICARE

## 2022-07-07 ENCOUNTER — HOSPITAL ENCOUNTER (OUTPATIENT)
Age: 57
Setting detail: OUTPATIENT SURGERY
Discharge: HOME | End: 2022-07-07
Attending: SURGERY | Admitting: SURGERY
Payer: MEDICARE

## 2022-07-07 VITALS
RESPIRATION RATE: 16 BRPM | BODY MASS INDEX: 28.28 KG/M2 | TEMPERATURE: 97.3 F | HEIGHT: 66 IN | OXYGEN SATURATION: 96 % | DIASTOLIC BLOOD PRESSURE: 88 MMHG | WEIGHT: 176 LBS | SYSTOLIC BLOOD PRESSURE: 135 MMHG | HEART RATE: 68 BPM

## 2022-07-07 DIAGNOSIS — Z85.3 HISTORY OF RIGHT BREAST CANCER: ICD-10-CM

## 2022-07-07 DIAGNOSIS — D48.61 BORDERLINE FINDING OF PHYLLODES NEOPLASM OF BREAST, RIGHT: ICD-10-CM

## 2022-07-07 LAB
EKG ATRIAL RATE: 70 BPM
EKG DIAGNOSIS: NORMAL
EKG P AXIS: -24 DEGREES
EKG P-R INTERVAL: 168 MS
EKG Q-T INTERVAL: 440 MS
EKG QRS DURATION: 88 MS
EKG QTC CALCULATION (BAZETT): 475 MS
EKG R AXIS: 21 DEGREES
EKG T AXIS: -6 DEGREES
EKG VENTRICULAR RATE: 70 BPM
GLUCOSE BLD-MCNC: 129 MG/DL (ref 70–99)
GLUCOSE BLD-MCNC: 134 MG/DL (ref 70–99)
PERFORMED ON: ABNORMAL
PERFORMED ON: ABNORMAL
POTASSIUM, WHOLE BLOOD: 2.5 MMOL/L (ref 3.5–5.1)
POTASSIUM, WHOLE BLOOD: 2.6 MMOL/L (ref 3.5–5.1)

## 2022-07-07 PROCEDURE — 6360000002 HC RX W HCPCS: Performed by: ANESTHESIOLOGY

## 2022-07-07 PROCEDURE — 2580000003 HC RX 258: Performed by: ANESTHESIOLOGY

## 2022-07-07 PROCEDURE — 88307 TISSUE EXAM BY PATHOLOGIST: CPT

## 2022-07-07 PROCEDURE — 7100000000 HC PACU RECOVERY - FIRST 15 MIN: Performed by: SURGERY

## 2022-07-07 PROCEDURE — 3430000000 HC RX DIAGNOSTIC RADIOPHARMACEUTICAL: Performed by: SURGERY

## 2022-07-07 PROCEDURE — 3700000001 HC ADD 15 MINUTES (ANESTHESIA): Performed by: SURGERY

## 2022-07-07 PROCEDURE — 84132 ASSAY OF SERUM POTASSIUM: CPT

## 2022-07-07 PROCEDURE — 93005 ELECTROCARDIOGRAM TRACING: CPT | Performed by: ANESTHESIOLOGY

## 2022-07-07 PROCEDURE — A4217 STERILE WATER/SALINE, 500 ML: HCPCS | Performed by: SURGERY

## 2022-07-07 PROCEDURE — 7100000001 HC PACU RECOVERY - ADDTL 15 MIN: Performed by: SURGERY

## 2022-07-07 PROCEDURE — 2500000003 HC RX 250 WO HCPCS

## 2022-07-07 PROCEDURE — 3600000014 HC SURGERY LEVEL 4 ADDTL 15MIN: Performed by: SURGERY

## 2022-07-07 PROCEDURE — 2720000010 HC SURG SUPPLY STERILE: Performed by: SURGERY

## 2022-07-07 PROCEDURE — 3700000000 HC ANESTHESIA ATTENDED CARE: Performed by: SURGERY

## 2022-07-07 PROCEDURE — 6360000002 HC RX W HCPCS: Performed by: SURGERY

## 2022-07-07 PROCEDURE — 88305 TISSUE EXAM BY PATHOLOGIST: CPT

## 2022-07-07 PROCEDURE — 6370000000 HC RX 637 (ALT 250 FOR IP): Performed by: ANESTHESIOLOGY

## 2022-07-07 PROCEDURE — 19303 MAST SIMPLE COMPLETE: CPT | Performed by: SURGERY

## 2022-07-07 PROCEDURE — 6360000002 HC RX W HCPCS

## 2022-07-07 PROCEDURE — 2580000003 HC RX 258: Performed by: SURGERY

## 2022-07-07 PROCEDURE — 93010 ELECTROCARDIOGRAM REPORT: CPT | Performed by: INTERNAL MEDICINE

## 2022-07-07 PROCEDURE — A9520 TC99 TILMANOCEPT DIAG 0.5MCI: HCPCS | Performed by: SURGERY

## 2022-07-07 PROCEDURE — 6370000000 HC RX 637 (ALT 250 FOR IP): Performed by: SURGERY

## 2022-07-07 PROCEDURE — 38525 BIOPSY/REMOVAL LYMPH NODES: CPT | Performed by: SURGERY

## 2022-07-07 PROCEDURE — 78195 LYMPH SYSTEM IMAGING: CPT

## 2022-07-07 PROCEDURE — 7100000011 HC PHASE II RECOVERY - ADDTL 15 MIN: Performed by: SURGERY

## 2022-07-07 PROCEDURE — 2709999900 HC NON-CHARGEABLE SUPPLY: Performed by: SURGERY

## 2022-07-07 PROCEDURE — 7100000010 HC PHASE II RECOVERY - FIRST 15 MIN: Performed by: SURGERY

## 2022-07-07 PROCEDURE — 3600000004 HC SURGERY LEVEL 4 BASE: Performed by: SURGERY

## 2022-07-07 PROCEDURE — 2500000003 HC RX 250 WO HCPCS: Performed by: SURGERY

## 2022-07-07 RX ORDER — SODIUM CHLORIDE 0.9 % (FLUSH) 0.9 %
5-40 SYRINGE (ML) INJECTION EVERY 12 HOURS SCHEDULED
Status: DISCONTINUED | OUTPATIENT
Start: 2022-07-07 | End: 2022-07-07 | Stop reason: HOSPADM

## 2022-07-07 RX ORDER — DEXAMETHASONE SODIUM PHOSPHATE 4 MG/ML
INJECTION, SOLUTION INTRA-ARTICULAR; INTRALESIONAL; INTRAMUSCULAR; INTRAVENOUS; SOFT TISSUE PRN
Status: DISCONTINUED | OUTPATIENT
Start: 2022-07-07 | End: 2022-07-07 | Stop reason: SDUPTHER

## 2022-07-07 RX ORDER — POTASSIUM CHLORIDE 7.45 MG/ML
10 INJECTION INTRAVENOUS
Status: DISCONTINUED | OUTPATIENT
Start: 2022-07-07 | End: 2022-07-07 | Stop reason: HOSPADM

## 2022-07-07 RX ORDER — MAGNESIUM HYDROXIDE 1200 MG/15ML
LIQUID ORAL CONTINUOUS PRN
Status: DISCONTINUED | OUTPATIENT
Start: 2022-07-07 | End: 2022-07-07 | Stop reason: HOSPADM

## 2022-07-07 RX ORDER — ONDANSETRON 2 MG/ML
INJECTION INTRAMUSCULAR; INTRAVENOUS PRN
Status: DISCONTINUED | OUTPATIENT
Start: 2022-07-07 | End: 2022-07-07 | Stop reason: SDUPTHER

## 2022-07-07 RX ORDER — POTASSIUM CHLORIDE 20 MEQ/1
40 TABLET, EXTENDED RELEASE ORAL PRN
Status: DISCONTINUED | OUTPATIENT
Start: 2022-07-07 | End: 2022-07-07 | Stop reason: HOSPADM

## 2022-07-07 RX ORDER — FENTANYL CITRATE 50 UG/ML
25 INJECTION, SOLUTION INTRAMUSCULAR; INTRAVENOUS EVERY 5 MIN PRN
Status: DISCONTINUED | OUTPATIENT
Start: 2022-07-07 | End: 2022-07-07 | Stop reason: HOSPADM

## 2022-07-07 RX ORDER — ROCURONIUM BROMIDE 10 MG/ML
INJECTION, SOLUTION INTRAVENOUS PRN
Status: DISCONTINUED | OUTPATIENT
Start: 2022-07-07 | End: 2022-07-07 | Stop reason: SDUPTHER

## 2022-07-07 RX ORDER — SODIUM CHLORIDE 9 MG/ML
INJECTION, SOLUTION INTRAVENOUS PRN
Status: DISCONTINUED | OUTPATIENT
Start: 2022-07-07 | End: 2022-07-07 | Stop reason: HOSPADM

## 2022-07-07 RX ORDER — SODIUM CHLORIDE 0.9 % (FLUSH) 0.9 %
5-40 SYRINGE (ML) INJECTION PRN
Status: DISCONTINUED | OUTPATIENT
Start: 2022-07-07 | End: 2022-07-07 | Stop reason: HOSPADM

## 2022-07-07 RX ORDER — EPHEDRINE SULFATE/0.9% NACL/PF 50 MG/5 ML
SYRINGE (ML) INTRAVENOUS PRN
Status: DISCONTINUED | OUTPATIENT
Start: 2022-07-07 | End: 2022-07-07 | Stop reason: SDUPTHER

## 2022-07-07 RX ORDER — FENTANYL CITRATE 50 UG/ML
INJECTION, SOLUTION INTRAMUSCULAR; INTRAVENOUS PRN
Status: DISCONTINUED | OUTPATIENT
Start: 2022-07-07 | End: 2022-07-07 | Stop reason: SDUPTHER

## 2022-07-07 RX ORDER — BUPIVACAINE HYDROCHLORIDE 5 MG/ML
INJECTION, SOLUTION EPIDURAL; INTRACAUDAL
Status: COMPLETED | OUTPATIENT
Start: 2022-07-07 | End: 2022-07-07

## 2022-07-07 RX ORDER — LIDOCAINE HYDROCHLORIDE 20 MG/ML
INJECTION, SOLUTION EPIDURAL; INFILTRATION; INTRACAUDAL; PERINEURAL PRN
Status: DISCONTINUED | OUTPATIENT
Start: 2022-07-07 | End: 2022-07-07 | Stop reason: SDUPTHER

## 2022-07-07 RX ORDER — SODIUM CHLORIDE 9 MG/ML
INJECTION, SOLUTION INTRAVENOUS CONTINUOUS
Status: DISCONTINUED | OUTPATIENT
Start: 2022-07-07 | End: 2022-07-07 | Stop reason: HOSPADM

## 2022-07-07 RX ORDER — OXYCODONE HYDROCHLORIDE 5 MG/1
5 TABLET ORAL
Status: DISCONTINUED | OUTPATIENT
Start: 2022-07-07 | End: 2022-07-07 | Stop reason: HOSPADM

## 2022-07-07 RX ORDER — OXYCODONE HYDROCHLORIDE AND ACETAMINOPHEN 5; 325 MG/1; MG/1
1 TABLET ORAL EVERY 6 HOURS PRN
Qty: 12 TABLET | Refills: 0 | Status: SHIPPED | OUTPATIENT
Start: 2022-07-07 | End: 2022-07-10

## 2022-07-07 RX ORDER — GLYCOPYRROLATE 0.2 MG/ML
INJECTION INTRAMUSCULAR; INTRAVENOUS PRN
Status: DISCONTINUED | OUTPATIENT
Start: 2022-07-07 | End: 2022-07-07 | Stop reason: SDUPTHER

## 2022-07-07 RX ORDER — DROPERIDOL 2.5 MG/ML
0.62 INJECTION, SOLUTION INTRAMUSCULAR; INTRAVENOUS
Status: DISCONTINUED | OUTPATIENT
Start: 2022-07-07 | End: 2022-07-07 | Stop reason: HOSPADM

## 2022-07-07 RX ORDER — SUCCINYLCHOLINE/SOD CL,ISO/PF 200MG/10ML
SYRINGE (ML) INTRAVENOUS PRN
Status: DISCONTINUED | OUTPATIENT
Start: 2022-07-07 | End: 2022-07-07 | Stop reason: SDUPTHER

## 2022-07-07 RX ORDER — MIDAZOLAM HYDROCHLORIDE 1 MG/ML
INJECTION INTRAMUSCULAR; INTRAVENOUS PRN
Status: DISCONTINUED | OUTPATIENT
Start: 2022-07-07 | End: 2022-07-07 | Stop reason: SDUPTHER

## 2022-07-07 RX ORDER — ACETAMINOPHEN 325 MG/1
650 TABLET ORAL
Status: COMPLETED | OUTPATIENT
Start: 2022-07-07 | End: 2022-07-07

## 2022-07-07 RX ORDER — PROPOFOL 10 MG/ML
INJECTION, EMULSION INTRAVENOUS PRN
Status: DISCONTINUED | OUTPATIENT
Start: 2022-07-07 | End: 2022-07-07 | Stop reason: SDUPTHER

## 2022-07-07 RX ORDER — ONDANSETRON 2 MG/ML
4 INJECTION INTRAMUSCULAR; INTRAVENOUS
Status: DISCONTINUED | OUTPATIENT
Start: 2022-07-07 | End: 2022-07-07 | Stop reason: HOSPADM

## 2022-07-07 RX ORDER — POTASSIUM CHLORIDE 7.45 MG/ML
10 INJECTION INTRAVENOUS PRN
Status: DISCONTINUED | OUTPATIENT
Start: 2022-07-07 | End: 2022-07-07 | Stop reason: HOSPADM

## 2022-07-07 RX ADMIN — PROPOFOL 40 MG: 10 INJECTION, EMULSION INTRAVENOUS at 11:10

## 2022-07-07 RX ADMIN — CEFAZOLIN 2000 MG: 2 INJECTION, POWDER, FOR SOLUTION INTRAMUSCULAR; INTRAVENOUS at 11:14

## 2022-07-07 RX ADMIN — Medication 10 MEQ: at 12:35

## 2022-07-07 RX ADMIN — HYDROMORPHONE HYDROCHLORIDE 0.25 MG: 1 INJECTION, SOLUTION INTRAMUSCULAR; INTRAVENOUS; SUBCUTANEOUS at 13:12

## 2022-07-07 RX ADMIN — Medication 10 MG: at 11:25

## 2022-07-07 RX ADMIN — SODIUM CHLORIDE: 9 INJECTION, SOLUTION INTRAVENOUS at 09:04

## 2022-07-07 RX ADMIN — LIDOCAINE HYDROCHLORIDE 60 MG: 20 INJECTION, SOLUTION EPIDURAL; INFILTRATION; INTRACAUDAL; PERINEURAL at 11:09

## 2022-07-07 RX ADMIN — TILMANOCEPT 0.5 MILLICURIE: KIT at 09:12

## 2022-07-07 RX ADMIN — FENTANYL CITRATE 25 MCG: 50 INJECTION, SOLUTION INTRAMUSCULAR; INTRAVENOUS at 13:43

## 2022-07-07 RX ADMIN — ACETAMINOPHEN 650 MG: 325 TABLET ORAL at 09:14

## 2022-07-07 RX ADMIN — Medication 10 MEQ: at 16:30

## 2022-07-07 RX ADMIN — PROPOFOL 160 MG: 10 INJECTION, EMULSION INTRAVENOUS at 11:09

## 2022-07-07 RX ADMIN — POTASSIUM BICARBONATE 40 MEQ: 782 TABLET, EFFERVESCENT ORAL at 15:50

## 2022-07-07 RX ADMIN — SODIUM CHLORIDE: 9 INJECTION, SOLUTION INTRAVENOUS at 11:15

## 2022-07-07 RX ADMIN — ONDANSETRON 4 MG: 2 INJECTION INTRAMUSCULAR; INTRAVENOUS at 12:50

## 2022-07-07 RX ADMIN — POTASSIUM CHLORIDE 10 MEQ: 7.46 INJECTION, SOLUTION INTRAVENOUS at 15:30

## 2022-07-07 RX ADMIN — MIDAZOLAM 2 MG: 1 INJECTION INTRAMUSCULAR; INTRAVENOUS at 11:00

## 2022-07-07 RX ADMIN — GLYCOPYRROLATE 0.2 MG: 0.2 INJECTION, SOLUTION INTRAMUSCULAR; INTRAVENOUS at 12:10

## 2022-07-07 RX ADMIN — FENTANYL CITRATE 25 MCG: 50 INJECTION INTRAMUSCULAR; INTRAVENOUS at 12:54

## 2022-07-07 RX ADMIN — ROCURONIUM BROMIDE 45 MG: 10 SOLUTION INTRAVENOUS at 11:23

## 2022-07-07 RX ADMIN — DEXAMETHASONE SODIUM PHOSPHATE 8 MG: 4 INJECTION, SOLUTION INTRAMUSCULAR; INTRAVENOUS at 11:15

## 2022-07-07 RX ADMIN — FENTANYL CITRATE 25 MCG: 50 INJECTION, SOLUTION INTRAMUSCULAR; INTRAVENOUS at 13:51

## 2022-07-07 RX ADMIN — ONDANSETRON 4 MG: 2 INJECTION INTRAMUSCULAR; INTRAVENOUS at 11:15

## 2022-07-07 RX ADMIN — HYDROMORPHONE HYDROCHLORIDE 0.25 MG: 1 INJECTION, SOLUTION INTRAMUSCULAR; INTRAVENOUS; SUBCUTANEOUS at 13:06

## 2022-07-07 RX ADMIN — FENTANYL CITRATE 50 MCG: 50 INJECTION INTRAMUSCULAR; INTRAVENOUS at 11:15

## 2022-07-07 RX ADMIN — Medication 120 MG: at 11:10

## 2022-07-07 RX ADMIN — FENTANYL CITRATE 25 MCG: 50 INJECTION INTRAMUSCULAR; INTRAVENOUS at 12:58

## 2022-07-07 RX ADMIN — FENTANYL CITRATE 50 MCG: 50 INJECTION INTRAMUSCULAR; INTRAVENOUS at 11:08

## 2022-07-07 RX ADMIN — FENTANYL CITRATE 50 MCG: 50 INJECTION INTRAMUSCULAR; INTRAVENOUS at 11:37

## 2022-07-07 RX ADMIN — Medication 10 MEQ: at 09:53

## 2022-07-07 RX ADMIN — ROCURONIUM BROMIDE 5 MG: 10 SOLUTION INTRAVENOUS at 11:09

## 2022-07-07 RX ADMIN — Medication 10 MEQ: at 11:15

## 2022-07-07 RX ADMIN — SUGAMMADEX 200 MG: 100 INJECTION, SOLUTION INTRAVENOUS at 12:50

## 2022-07-07 ASSESSMENT — PAIN DESCRIPTION - ONSET
ONSET: ON-GOING
ONSET: ON-GOING
ONSET: AWAKENED FROM SLEEP
ONSET: ON-GOING
ONSET: ON-GOING

## 2022-07-07 ASSESSMENT — ENCOUNTER SYMPTOMS: SHORTNESS OF BREATH: 0

## 2022-07-07 ASSESSMENT — PAIN DESCRIPTION - FREQUENCY
FREQUENCY: CONTINUOUS

## 2022-07-07 ASSESSMENT — PAIN - FUNCTIONAL ASSESSMENT
PAIN_FUNCTIONAL_ASSESSMENT: 0-10
PAIN_FUNCTIONAL_ASSESSMENT: PREVENTS OR INTERFERES SOME ACTIVE ACTIVITIES AND ADLS

## 2022-07-07 ASSESSMENT — PAIN DESCRIPTION - LOCATION
LOCATION: BREAST

## 2022-07-07 ASSESSMENT — PAIN DESCRIPTION - DESCRIPTORS
DESCRIPTORS: SORE
DESCRIPTORS: ACHING
DESCRIPTORS: DISCOMFORT

## 2022-07-07 ASSESSMENT — PAIN DESCRIPTION - ORIENTATION
ORIENTATION: RIGHT

## 2022-07-07 ASSESSMENT — PAIN SCALES - GENERAL
PAINLEVEL_OUTOF10: 2
PAINLEVEL_OUTOF10: 5
PAINLEVEL_OUTOF10: 2
PAINLEVEL_OUTOF10: 4
PAINLEVEL_OUTOF10: 3
PAINLEVEL_OUTOF10: 4
PAINLEVEL_OUTOF10: 5

## 2022-07-07 ASSESSMENT — PAIN DESCRIPTION - PAIN TYPE
TYPE: SURGICAL PAIN

## 2022-07-07 NOTE — PROGRESS NOTES
Time out completed prior nuclear med injection of the right breast with Kateryna Mckoy nuclear med. eRgina Selby, DALTON Del Real,MEGAN Hodge,JAGDISH Tierney,Dr Boudreaux, TORRIE Baird, KELSEY Jensen,and BIENVENIDO Chambers.

## 2022-07-07 NOTE — PROGRESS NOTES
K level 2.6 per lab. Dr. Roopa Drew at bedside and aware, orders noted. IVPB KCL 10meq/100ml started per on pump. VSS.

## 2022-07-07 NOTE — PROGRESS NOTES
Lab called with critical result of potassium 2.5. Dr. Coleen Cardona and Dr. Granados Beat notified. Potassium protocol orders placed and Pt started on replacement potassium, Pt placed on cardiac monitor.

## 2022-07-07 NOTE — OP NOTE
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, right    Post-Op Diagnosis: Same       Procedure(s):  RIGHT MASTECTOMY, RIGHT AXILLARY SENTINEL NODE BIOPSY WITH RADIOISOTOPE    Surgeon(s): Virginie Mcneil MD    Assistant:   Surgical Assistant: José Luis Roldan; Andreas Isaacs RN    Anesthesia: General    Estimated Blood Loss (mL): Minimal    Complications: None    Specimens:   ID Type Source Tests Collected by Time Destination   A : right breast stitch on tail Tissue Breast SURGICAL PATHOLOGY Virginie Mcneil MD 7/7/2022 1202    B : B) RIGHT AXILLARY SENTINAL NODE Tissue Tissue SURGICAL PATHOLOGY Virginie Mcneil MD 7/7/2022 1222    C : C) RIGHT BREAST ADDITIONAL SUPERIOR MARGIN Tissue Breast SURGICAL PATHOLOGY Virginie Mcneil MD 7/7/2022 1231        Implants:  * No implants in log *      Drains:   Closed/Suction Drain Right Breast Bulb (Active)       Findings: see op note    Detailed Description of Procedure:   OPERATIVE REPORT    Name:  Radha Oro   MRN:  [id number]  Date:  7/7/2022        PREOPERATIVE DIAGNOSIS: right breast cancer. POSTOPERATIVE DIAGNOSIS: same    PROCEDURE: right mastectomy, right axillary sentinel lymph node biopsy. ANESTHESIA: General.     SURGEON/ ASST: Kanika     ESTIMATED BLOOD LOSS:  Less than 50 mL     SPECIMENS: right breast tissue, right sentinel node    COMPLICATIONS: None    FINDINGS: see op note    INDICATIONS FOR PROCEDURE: The patient is a 55 y.o. 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 Risks, benefits, and alternatives were reviewed the patient, questions were answered,   and she is agreeable to proceed. Ms. Kelly Huff was met by me in the preoperative area.   The surgical sites were identified. Consent was obtained. The appropriate breast imaging was reviewed. DESCRIPTION OF OPERATION: The patient was brought to the operating room and  placed on the OR table in the supine position. General anesthesia was   obtained. The patient had radionucleotide injection per nuclear medicine for identification of axillary sentinel node. The right breast and axillary region were then prepped and draped in the usual sterile fashion. A transverse elliptical incision was made on the right breast, carried down through the skin and subcutaneous tissues, taking care to create a greater than 1 cm margin around the tumor, and flaps were raised using electrocautery to remove the breast tissue superiorly up to the clavicle and   inferiorly down to the inframammary fold, and the breast was then removed off   the underlying pectoralis muscle in a medial-to-lateral direction using   electrocautery. Larger vessels were controlled with clips. Dissection was carried up to the tail of the breast which was then removed, and the   tail was marked with a suture and the breast was sent to Pathology for   permanent section. I took some additional tissue from the superior flap to allow for a better cosmetic closure, and this was also sent to pathology. The axillary fascia was divided using cautery. A gamma probe was used to identify the sentinel lymph node and this   was dissected free from the surrounding tissues, and vessels and lymphatics   were divided using the Harmonic scalpel, and the lymph node was removed and this had a count of 629. and this was sent to Pathology as right axillary   sentinel lymph node. The residual count in the axillary region was minimal. Hemostasis was assured and the area   was irrigated and injected with local anesthetic. I placed a 15-Yoruba Chao drain through   a separate stab wound laterally and this was sutured in place with 2-0 silk   and hooked to bulb suction.  The wound was closed with

## 2022-07-07 NOTE — PROGRESS NOTES
Patient admitted to PACU from OR. Patient awake, drowsy. Resp easy unlabored on 2LNC with SaO2 98%. Right breast surgical glue dressing with edges well approximated, no drainage noted. Surgical bra intact. Ice pack on. IV patent to right hand with 10KCL infusing per pump from OR. Left hand IV capped. Patient states surgical discomfort at 2 of 10 and tolerable. VSS. Moving all extremities to commands. Patient denies C/O nausea.

## 2022-07-07 NOTE — ANESTHESIA PRE PROCEDURE
Department of Anesthesiology  Preprocedure Note       Name:  Mavis Scherer   Age:  55 y.o.  :  1965                                          MRN:  [id number]         Date:  2022      Surgeon: Wanda Query): Reginald Rushing MD    Procedure: Procedure(s):  RIGHT MASTECTOMY, RIGHT AXILLARY SENTINEL NODE BIOPSY WITH RADIOISOTOPE    Medications prior to admission:   Prior to Admission medications    Medication Sig Start Date End Date Taking?  Authorizing Provider   potassium chloride (KLOR-CON M) 10 MEQ extended release tablet Take 2 tablets daily for one week then 1 tablet daily 22   Lonnie Romero MD   furosemide (LASIX) 80 MG tablet Take 1 tablet by mouth daily 22   Lonnie Romero MD   atorvastatin (LIPITOR) 10 MG tablet Take 1 tablet by mouth daily 22   Kathi Trejo MD   lisinopril (PRINIVIL;ZESTRIL) 5 MG tablet Take 1 tablet by mouth daily 22   Kathi Trejo MD   metoprolol succinate (TOPROL XL) 25 MG extended release tablet Take 1 tablet by mouth daily 22   Kathi Trejo MD       Current medications:    Current Facility-Administered Medications   Medication Dose Route Frequency Provider Last Rate Last Admin    ceFAZolin (ANCEF) 2,000 mg in dextrose 5 % 50 mL IVPB (mini-bag)  2,000 mg IntraVENous Once Reginald Rushing MD        0.9 % sodium chloride infusion   IntraVENous Continuous Edgard Ramirez MD        sodium chloride flush 0.9 % injection 5-40 mL  5-40 mL IntraVENous 2 times per day Edgard Ramirez MD        sodium chloride flush 0.9 % injection 5-40 mL  5-40 mL IntraVENous PRN Edgard Ramirez MD        0.9 % sodium chloride infusion   IntraVENous PRN Edgard Ramirez MD 75 mL/hr at 22 0943 NoRateChange at 22 0943    potassium chloride (KLOR-CON M) extended release tablet 40 mEq  40 mEq Oral PRN Ajit Menchaca MD        Or    potassium bicarb-citric acid (EFFER-K) effervescent tablet 40 mEq  40 mEq Oral PRN Ajit Menchaca MD Or    potassium chloride 10 mEq/100 mL IVPB (Peripheral Line)  10 mEq IntraVENous PRN Jorge Durham  mL/hr at 22 0953 10 mEq at 22 1702     Facility-Administered Medications Ordered in Other Encounters   Medication Dose Route Frequency Provider Last Rate Last Admin    technetium Tc 99m tilmanocept (LYMPHOSEEK) injection 0.5 millicurie  241 micro curie SubCUTAneous ONCE PRN Abdulaziz Denny MD   0.5 millicurie at 72/58/40 3604       Allergies:  No Known Allergies    Problem List:    Patient Active Problem List   Diagnosis Code    Malignancy ([facility] C80.1    Leg swelling M79.89    Cellulitis L03.90    New onset of congestive heart failure (HCC) I50.9    Acute on chronic diastolic heart failure (HCC) I50.33    Type 2 diabetes mellitus without complication, without long-term current use of insulin (HCC) E11.9    Aortic atherosclerosis (HCC) I70.0    Nicotine dependence F17.200    Iron deficiency anemia D50.9    Acute on chronic systolic heart failure (HCC) I50.23    Acute respiratory failure with hypoxia (HCC) J96.01    Acute on chronic respiratory failure with hypercapnia (HCC) J96.22    Pleural effusion, right S78    Metabolic alkalosis K04.5    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, right D48.61       Past Medical History:        Diagnosis Date    Breast cancer ([facility]     CHF (congestive heart failure) ([facility]     Hyperlipidemia     Hypertension        Past Surgical History:        Procedure Laterality Date    CYST INCISION AND DRAINAGE      WISDOM TOOTH EXTRACTION         Social History:    Social History     Tobacco Use    Smoking status: Former Smoker     Packs/day: 1.50     Years: 43.00     Pack years: 64.50     Quit date: 2022     Years since quittin.1    Smokeless tobacco: Never Used   Substance Use Topics    Alcohol use:  Yes     Alcohol/week: 1.0 - 3.0 standard drink     Types: 1 - 3 Cans of beer per week     Comment: 22 last drink Counseling given: Not Answered      Vital Signs (Current):   Vitals:    06/30/22 1426 07/07/22 0848   BP:  (!) 152/83   Pulse:  81   Resp:  18   Temp:  96.8 °F (36 °C)   TempSrc:  Temporal   SpO2:  96%   Weight: 180 lb (81.6 kg) 176 lb (79.8 kg)   Height: 5' 6\" (1.676 m) 5' 6\" (1.676 m)                                              BP Readings from Last 3 Encounters:   07/07/22 (!) 152/83   06/24/22 (!) 140/84   06/06/22 (!) 146/80       NPO Status: Time of last liquid consumption: 0900 (sip with meds)                        Time of last solid consumption: 1900                        Date of last liquid consumption: 07/07/22                        Date of last solid food consumption: 07/06/22    BMI:   Wt Readings from Last 3 Encounters:   07/07/22 176 lb (79.8 kg)   06/24/22 180 lb 6.4 oz (81.8 kg)   06/06/22 180 lb 3.2 oz (81.7 kg)     Body mass index is 28.41 kg/m².     CBC:   Lab Results   Component Value Date/Time    WBC 8.2 07/01/2022 02:09 PM    RBC 4.75 07/01/2022 02:09 PM    HGB 12.0 07/01/2022 02:09 PM    HCT 36.6 07/01/2022 02:09 PM    MCV 77.0 07/01/2022 02:09 PM    RDW 21.3 07/01/2022 02:09 PM     07/01/2022 02:09 PM       CMP:   Lab Results   Component Value Date/Time     07/01/2022 02:09 PM    K 2.5 07/07/2022 09:03 AM    K 2.8 07/01/2022 02:09 PM    K 3.9 05/08/2022 04:45 PM     07/01/2022 02:09 PM    CO2 29 07/01/2022 02:09 PM    BUN 10 07/01/2022 02:09 PM    CREATININE 0.9 07/01/2022 02:09 PM    GFRAA >60 07/01/2022 02:09 PM    GFRAA >60 01/22/2011 09:32 AM    AGRATIO 1.0 05/08/2022 04:45 PM    LABGLOM >60 07/01/2022 02:09 PM    GLUCOSE 162 07/01/2022 02:09 PM    PROT 6.9 05/08/2022 04:45 PM    PROT 8.1 01/22/2011 09:32 AM    CALCIUM 9.4 07/01/2022 02:09 PM    BILITOT 1.1 05/08/2022 04:45 PM    ALKPHOS 93 05/08/2022 04:45 PM    AST 11 05/08/2022 04:45 PM    ALT 6 05/08/2022 04:45 PM       POC Tests:   Recent Labs     07/07/22  0910   POCGLU 134*       Coags: Lab Results   Component Value Date/Time    PROTIME 16.2 05/08/2022 09:48 PM    INR 1.41 05/08/2022 09:48 PM    APTT 27.1 05/08/2022 09:48 PM       HCG (If Applicable): No results found for: PREGTESTUR, PREGSERUM, HCG, HCGQUANT     ABGs:   Lab Results   Component Value Date/Time    PHART 7.295 05/08/2022 11:50 PM    PO2ART 72.5 05/08/2022 11:50 PM    QDL9ZOR 79.6 05/08/2022 11:50 PM    IHN7UNM 38.7 05/08/2022 11:50 PM    BEART 9.2 05/08/2022 11:50 PM    T1PBBUWF 92.7 05/08/2022 11:50 PM        Type & Screen (If Applicable):  No results found for: LABABO, LABRH    Drug/Infectious Status (If Applicable):  No results found for: HIV, HEPCAB    COVID-19 Screening (If Applicable): No results found for: COVID19        Anesthesia Evaluation  Patient summary reviewed and Nursing notes reviewed no history of anesthetic complications:   Airway: Mallampati: III  TM distance: >3 FB   Neck ROM: full  Mouth opening: > = 3 FB   Dental:    (+) poor dentition      Pulmonary:Negative Pulmonary ROS breath sounds clear to auscultation      (-) pneumonia, asthma, shortness of breath, recent URI and sleep apnea                           Cardiovascular:  Exercise tolerance: good (>4 METS),   (+) hypertension:, CHF: diastolic,     (-) valvular problems/murmurs, past MI, CAD, CABG/stent, dysrhythmias,  angina,  WALTERS and no pulmonary hypertension    ECG reviewed  Rhythm: regular  Rate: normal  Echocardiogram reviewed         Beta Blocker:  Dose within 24 Hrs      ROS comment: TTE:  Conclusions      Summary   Left ventricular cavity size is normal.   Normal left ventricular wall thickness. Ejection fraction is visually estimated to be 55-60%. No regional wall motion abnormalities are noted. Indeterminate diastolic function. Measurement attempted while definity was administered.    right ventricular appears mildly enlarged with possible mildly reduced Rv   function   Unable to obtain doppler, M-Mode and RV TDI   Right ventricular septum flattened in systole and diastole consistent with   RV pressure and volume overload. Mild pulmonic regurgitation present. Neuro/Psych:   Negative Neuro/Psych ROS     (-) seizures, TIA and CVA           GI/Hepatic/Renal:        (-) liver disease and no renal disease       Endo/Other:    (+) DiabetesType II DM, , electrolyte abnormalities, .    (-) hypothyroidism               Abdominal:             Vascular:     - PVD, DVT and PE. Other Findings:           Anesthesia Plan      general     ASA 3     (GETA, PIV, multimodal analgesics, PACU. Appears euvolemic today. She is hypokalemic, NSR without symptoms. Will replete potassium prior and during surgical procedure and re-check. ECG obtained today which was NSR, no U waves noted or other abnormalities. I discussed with the patient the risks and benefits to general anesthesia including possible anesthetic complications but not limited to: PONV, damage to the airway and surrounding structures (teeth, lips, gums, tongue, etc.), adverse reactions to medications, cardiac complications (MI, CHF, arrhythmias, etc.), respiratory complications (post-op ventilation, respiratory failure, etc.), neurologic complications (nerve damage, stroke, seizure), the potential for conversion to a general anesthetic, and death. The patient was given the opportunity to ask questions and all questions were answered to the patient's satisfaction.  )  Induction: intravenous. MIPS: Postoperative opioids intended and Prophylactic antiemetics administered. Anesthetic plan and risks discussed with patient. Plan discussed with CRNA. This pre-anesthesia assessment may be used as a history and physical.    DOS STAFF ADDENDUM:    Pt seen and examined, chart reviewed (including anesthesia, drug and allergy history). No interval changes to history and physical examination.   Anesthetic plan, risks, benefits, alternatives, and personnel involved discussed with patient. Patient verbalized an understanding and agrees to proceed.       Graeme Saul MD  July 7, 2022  10:12 AM

## 2022-07-07 NOTE — PROGRESS NOTES
IVPB KCL 10Meq infusing on pump to left hand IV. VSS. Right breast surgical dressing dry and intact. Patient taking po fluids prn. Patient states pain level 3 of 10 and tolerable. Family updated in family lounge.

## 2022-07-07 NOTE — ANESTHESIA POSTPROCEDURE EVALUATION
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 Felicitas Phase I: Felicitas Score: 10    Felicitas Phase II: Felicitas Score: 10      Anesthesia Post Evaluation    Patient location during evaluation: PACU  Patient participation: complete - patient participated  Level of consciousness: awake  Airway patency: patent  Nausea & Vomiting: no nausea and no vomiting  Cardiovascular status: blood pressure returned to baseline  Respiratory status: acceptable  Hydration status: stable  Comments: Potassium still low, repleted with an additional 40 mEq oral and 20 mEq IV. Discussed close follow up with PCP and to continue with oral potassium supplementation which the patient verbalized understanding.   Multimodal analgesia pain management approach

## 2022-07-07 NOTE — H&P
Update History & Physical    The patient's History and Physical of July 7, 2022 was reviewed with the patient and I examined the patient. There was no change. The surgical site was confirmed by the patient and me. Plan: The risks, benefits, expected outcome, and alternative to the recommended procedure have been discussed with the patient. Patient understands and wants to proceed with the procedure.      Electronically signed by Jorge A Lundy MD on 7/7/2022 at 9:26 AM

## 2022-07-07 NOTE — PROGRESS NOTES
Dr. Jatinder Barros at bedside, verbal order for patient to receive 1 more IVPB KCl 10 meq after present IVPB infused. Patient instructed to follow up with Dr. Azeb Panchal at home after discharge.

## 2022-07-08 ENCOUNTER — TELEPHONE (OUTPATIENT)
Dept: CARDIOLOGY CLINIC | Age: 57
End: 2022-07-08

## 2022-07-08 NOTE — TELEPHONE ENCOUNTER
Patient potassium level was 2.6 on 7/7/22. She states she was given IV potassium and she continues to take oral potassium chloride prescribed by Dr. Asiya Trejo. She has been instructed to repeat labs on Monday. Order for BMP placed. Patient v/u.

## 2022-07-11 DIAGNOSIS — I50.32 CHRONIC DIASTOLIC HEART FAILURE (HCC): Primary | ICD-10-CM

## 2022-07-11 DIAGNOSIS — I50.32 CHRONIC DIASTOLIC HEART FAILURE (HCC): ICD-10-CM

## 2022-07-11 LAB
ANION GAP SERPL CALCULATED.3IONS-SCNC: 16 MMOL/L (ref 3–16)
BUN BLDV-MCNC: 11 MG/DL (ref 7–20)
CALCIUM SERPL-MCNC: 9.7 MG/DL (ref 8.3–10.6)
CHLORIDE BLD-SCNC: 97 MMOL/L (ref 99–110)
CO2: 29 MMOL/L (ref 21–32)
CREAT SERPL-MCNC: 0.8 MG/DL (ref 0.6–1.1)
GFR AFRICAN AMERICAN: >60
GFR NON-AFRICAN AMERICAN: >60
GLUCOSE BLD-MCNC: 140 MG/DL (ref 70–99)
POTASSIUM SERPL-SCNC: 3.1 MMOL/L (ref 3.5–5.1)
SODIUM BLD-SCNC: 142 MMOL/L (ref 136–145)

## 2022-07-12 RX ORDER — POTASSIUM CHLORIDE 20 MEQ/1
20 TABLET, EXTENDED RELEASE ORAL DAILY
Qty: 90 TABLET | Refills: 3 | Status: SHIPPED | OUTPATIENT
Start: 2022-07-12 | End: 2022-08-15 | Stop reason: DRUGHIGH

## 2022-07-13 ENCOUNTER — TELEPHONE (OUTPATIENT)
Dept: BREAST CENTER | Age: 57
End: 2022-07-13

## 2022-07-13 NOTE — TELEPHONE ENCOUNTER
Spoke to patient, drain output is 15 ML x 3 days. She will come to [facility] office to have drain removed on 7/14/22.        Thanks, Edelmira Bosch

## 2022-07-14 ENCOUNTER — NURSE ONLY (OUTPATIENT)
Dept: SURGERY | Age: 57
End: 2022-07-14

## 2022-07-14 VITALS
DIASTOLIC BLOOD PRESSURE: 90 MMHG | OXYGEN SATURATION: 95 % | WEIGHT: 175 LBS | RESPIRATION RATE: 18 BRPM | BODY MASS INDEX: 28.12 KG/M2 | HEIGHT: 66 IN | HEART RATE: 68 BPM | SYSTOLIC BLOOD PRESSURE: 161 MMHG

## 2022-07-14 DIAGNOSIS — Z48.00 DRESSING CHANGE: Primary | ICD-10-CM

## 2022-07-14 NOTE — PROGRESS NOTES
Kavya Beltran (:  1965) is a 55 y.o. female,Established patient, here for evaluation of the following chief complaint(s): Wound Check (Drain removal )         ASSESSMENT/PLAN:     Shyam Cr here today to have ROMY drain evaluated and removed. Drain output for last three days 5 ML. Drain form scanned into patients chart. Suture and drain removed without complications. Dry dressing applied. Instructed to change dressing daily until healed. Shyam Cr will follow up with Dr. Bj Rdz for her post op appt on 2022. Instructed to call with any questions or concerns         An electronic signature was used to authenticate this note.     --Zak Haney LPN

## 2022-07-25 ENCOUNTER — OFFICE VISIT (OUTPATIENT)
Dept: BREAST CENTER | Age: 57
End: 2022-07-25

## 2022-07-25 VITALS
BODY MASS INDEX: 27.73 KG/M2 | HEART RATE: 64 BPM | DIASTOLIC BLOOD PRESSURE: 70 MMHG | WEIGHT: 171.8 LBS | SYSTOLIC BLOOD PRESSURE: 134 MMHG

## 2022-07-25 DIAGNOSIS — Z12.31 VISIT FOR SCREENING MAMMOGRAM: ICD-10-CM

## 2022-07-25 DIAGNOSIS — D48.61 BORDERLINE FINDING OF PHYLLODES NEOPLASM OF BREAST, RIGHT: Primary | ICD-10-CM

## 2022-07-25 DIAGNOSIS — Z90.11 S/P RIGHT MASTECTOMY: ICD-10-CM

## 2022-07-25 PROCEDURE — 99024 POSTOP FOLLOW-UP VISIT: CPT | Performed by: SURGERY

## 2022-07-25 NOTE — PATIENT INSTRUCTIONS
Incision healing well  May shower and gently pat incision (no bath)  Do right arm exercises by walking fingers up the wall  Patient to schedule left breast mammogram  Patient to call central scheduling to schedule [phone]  Return to office in 4 months    Healthy Lifestyle Recommendations: healthy diet (decrease consumption of red meat, increase fresh fruits and vegetables), decreased alcohol consumption (less than 4 drinks/week), adequate sleep (goal 6-8 hours), routine exercise (goal 150 minutes/week or greater), weight control.

## 2022-07-25 NOTE — PROGRESS NOTES
Subjective:      Patient ID: Loida Martinez is a 55 y.o. female. HPI   Chief Complaint   Patient presents with    Post-Op Check     Patient presents post op right breast mastectomy     Patient is s/p right mastectomy/snbx 2022 for a 12.9 cm borderline phylloides tumor, negative node. Margins clear by > 1 cm. Drain removed . No residual seroma. Wound healing well, instructed on wound care. Saw Dr. Jennifer Cohn. Will order left screening mammogram. Follow up 4 months. Past Medical History:   Diagnosis Date    Breast cancer ([facility]     CHF (congestive heart failure) ([facility]     Hyperlipidemia     Hypertension        Past Surgical History:   Procedure Laterality Date    CYST INCISION AND DRAINAGE      MASTECTOMY Right 2022    RIGHT MASTECTOMY, RIGHT AXILLARY SENTINEL NODE BIOPSY WITH RADIOISOTOPE performed by Julia Barrientos MD at [facility]         Current Outpatient Medications   Medication Sig Dispense Refill    potassium chloride (KLOR-CON M) 20 MEQ extended release tablet Take 1 tablet by mouth daily 90 tablet 3    furosemide (LASIX) 80 MG tablet Take 1 tablet by mouth daily 30 tablet 3    atorvastatin (LIPITOR) 10 MG tablet Take 1 tablet by mouth daily 30 tablet 3    lisinopril (PRINIVIL;ZESTRIL) 5 MG tablet Take 1 tablet by mouth daily 30 tablet 3    metoprolol succinate (TOPROL XL) 25 MG extended release tablet Take 1 tablet by mouth daily 30 tablet 3     No current facility-administered medications for this visit.        Social History     Socioeconomic History    Marital status:      Spouse name: Not on file    Number of children: Not on file    Years of education: Not on file    Highest education level: Not on file   Occupational History    Not on file   Tobacco Use    Smoking status: Former     Packs/day: 1.50     Years: 43.00     Pack years: 64.50     Types: Cigarettes     Quit date: 2022     Years since quittin.2    Smokeless tobacco: Never   Vaping Use    Vaping Use: Never used   Substance and Sexual Activity    Alcohol use: Yes     Alcohol/week: 1.0 - 3.0 standard drink     Types: 1 - 3 Cans of beer per week     Comment: 05/07/22 last drink    Drug use: Never    Sexual activity: Yes   Other Topics Concern    Not on file   Social History Narrative    Not on file     Social Determinants of Health     Financial Resource Strain: Not on file   Food Insecurity: Not on file   Transportation Needs: Not on file   Physical Activity: Not on file   Stress: Not on file   Social Connections: Not on file   Intimate Partner Violence: Not on file   Housing Stability: Not on file       Objective:   Physical Exam        Assessment:      Diagnosis Orders   1. 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 S/P right mastectomy  AK BREAST PROSTHESIS BRA & FORM      3.  Visit for screening mammogram  DARREN DIGITAL SCREEN UNILATERAL LEFT             Plan:     See above       Electronically signed by Ted Maciel MD on 7/25/2022 at 12:35 PM

## 2022-08-15 ENCOUNTER — OFFICE VISIT (OUTPATIENT)
Dept: FAMILY MEDICINE CLINIC | Age: 57
End: 2022-08-15
Payer: MEDICARE

## 2022-08-15 VITALS
WEIGHT: 172 LBS | TEMPERATURE: 98.1 F | RESPIRATION RATE: 16 BRPM | HEIGHT: 65 IN | BODY MASS INDEX: 28.66 KG/M2 | HEART RATE: 61 BPM | OXYGEN SATURATION: 98 % | DIASTOLIC BLOOD PRESSURE: 72 MMHG | SYSTOLIC BLOOD PRESSURE: 138 MMHG

## 2022-08-15 DIAGNOSIS — Z12.4 CERVICAL CANCER SCREENING: ICD-10-CM

## 2022-08-15 DIAGNOSIS — Z87.891 HISTORY OF TOBACCO ABUSE: ICD-10-CM

## 2022-08-15 DIAGNOSIS — E11.9 CONTROLLED TYPE 2 DIABETES MELLITUS WITHOUT COMPLICATION, WITHOUT LONG-TERM CURRENT USE OF INSULIN (HCC): ICD-10-CM

## 2022-08-15 DIAGNOSIS — D50.9 IRON DEFICIENCY ANEMIA, UNSPECIFIED IRON DEFICIENCY ANEMIA TYPE: ICD-10-CM

## 2022-08-15 DIAGNOSIS — Z90.11 S/P RIGHT MASTECTOMY: ICD-10-CM

## 2022-08-15 DIAGNOSIS — D48.61 BORDERLINE FINDING OF PHYLLODES NEOPLASM OF BREAST, RIGHT: Primary | ICD-10-CM

## 2022-08-15 DIAGNOSIS — E87.6 HYPOKALEMIA: Primary | ICD-10-CM

## 2022-08-15 DIAGNOSIS — Z12.11 COLON CANCER SCREENING: ICD-10-CM

## 2022-08-15 DIAGNOSIS — I50.32 CHRONIC DIASTOLIC HEART FAILURE (HCC): ICD-10-CM

## 2022-08-15 PROBLEM — M79.89 LEG SWELLING: Status: RESOLVED | Noted: 2022-05-08 | Resolved: 2022-08-15

## 2022-08-15 PROBLEM — E27.8 ADRENAL NODULE (HCC): Status: RESOLVED | Noted: 2022-08-15 | Resolved: 2022-08-15

## 2022-08-15 PROBLEM — E27.8 ADRENAL NODULE (HCC): Status: ACTIVE | Noted: 2022-08-15

## 2022-08-15 PROBLEM — E27.9 ADRENAL NODULE (HCC): Status: RESOLVED | Noted: 2022-08-15 | Resolved: 2022-08-15

## 2022-08-15 PROBLEM — L03.90 CELLULITIS: Status: RESOLVED | Noted: 2022-05-08 | Resolved: 2022-08-15

## 2022-08-15 PROBLEM — E27.9 ADRENAL NODULE (HCC): Status: ACTIVE | Noted: 2022-08-15

## 2022-08-15 LAB
A/G RATIO: 1.3 (ref 1.1–2.2)
ALBUMIN SERPL-MCNC: 4.3 G/DL (ref 3.4–5)
ALP BLD-CCNC: 130 U/L (ref 40–129)
ALT SERPL-CCNC: 20 U/L (ref 10–40)
ANION GAP SERPL CALCULATED.3IONS-SCNC: 14 MMOL/L (ref 3–16)
AST SERPL-CCNC: 23 U/L (ref 15–37)
BASOPHILS ABSOLUTE: 0.1 K/UL (ref 0–0.2)
BASOPHILS RELATIVE PERCENT: 0.7 %
BILIRUB SERPL-MCNC: 0.7 MG/DL (ref 0–1)
BUN BLDV-MCNC: 13 MG/DL (ref 7–20)
CALCIUM SERPL-MCNC: 9.6 MG/DL (ref 8.3–10.6)
CHLORIDE BLD-SCNC: 99 MMOL/L (ref 99–110)
CO2: 32 MMOL/L (ref 21–32)
CREAT SERPL-MCNC: 0.8 MG/DL (ref 0.6–1.1)
CREATININE URINE: 52.3 MG/DL (ref 28–259)
EOSINOPHILS ABSOLUTE: 0.2 K/UL (ref 0–0.6)
EOSINOPHILS RELATIVE PERCENT: 2.5 %
FERRITIN: 370.7 NG/ML (ref 15–150)
GFR AFRICAN AMERICAN: >60
GFR NON-AFRICAN AMERICAN: >60
GLUCOSE BLD-MCNC: 112 MG/DL (ref 70–99)
HBA1C MFR BLD: 5.3 %
HCT VFR BLD CALC: 38.1 % (ref 36–48)
HEMOGLOBIN: 13.4 G/DL (ref 12–16)
IRON SATURATION: 32 % (ref 15–50)
IRON: 101 UG/DL (ref 37–145)
LYMPHOCYTES ABSOLUTE: 2.4 K/UL (ref 1–5.1)
LYMPHOCYTES RELATIVE PERCENT: 25.3 %
MCH RBC QN AUTO: 28.8 PG (ref 26–34)
MCHC RBC AUTO-ENTMCNC: 35.3 G/DL (ref 31–36)
MCV RBC AUTO: 81.6 FL (ref 80–100)
MICROALBUMIN UR-MCNC: <1.2 MG/DL
MICROALBUMIN/CREAT UR-RTO: NORMAL MG/G (ref 0–30)
MONOCYTES ABSOLUTE: 0.6 K/UL (ref 0–1.3)
MONOCYTES RELATIVE PERCENT: 6 %
NEUTROPHILS ABSOLUTE: 6.2 K/UL (ref 1.7–7.7)
NEUTROPHILS RELATIVE PERCENT: 65.5 %
PDW BLD-RTO: 14.4 % (ref 12.4–15.4)
PLATELET # BLD: 243 K/UL (ref 135–450)
PMV BLD AUTO: 8.1 FL (ref 5–10.5)
POTASSIUM SERPL-SCNC: 2.6 MMOL/L (ref 3.5–5.1)
RBC # BLD: 4.66 M/UL (ref 4–5.2)
SODIUM BLD-SCNC: 145 MMOL/L (ref 136–145)
TOTAL IRON BINDING CAPACITY: 316 UG/DL (ref 260–445)
TOTAL PROTEIN: 7.7 G/DL (ref 6.4–8.2)
WBC # BLD: 9.4 K/UL (ref 4–11)

## 2022-08-15 PROCEDURE — 3017F COLORECTAL CA SCREEN DOC REV: CPT | Performed by: NURSE PRACTITIONER

## 2022-08-15 PROCEDURE — 99204 OFFICE O/P NEW MOD 45 MIN: CPT | Performed by: NURSE PRACTITIONER

## 2022-08-15 PROCEDURE — 83036 HEMOGLOBIN GLYCOSYLATED A1C: CPT | Performed by: NURSE PRACTITIONER

## 2022-08-15 PROCEDURE — 1036F TOBACCO NON-USER: CPT | Performed by: NURSE PRACTITIONER

## 2022-08-15 PROCEDURE — G8427 DOCREV CUR MEDS BY ELIG CLIN: HCPCS | Performed by: NURSE PRACTITIONER

## 2022-08-15 PROCEDURE — 2022F DILAT RTA XM EVC RTNOPTHY: CPT | Performed by: NURSE PRACTITIONER

## 2022-08-15 PROCEDURE — G8417 CALC BMI ABV UP PARAM F/U: HCPCS | Performed by: NURSE PRACTITIONER

## 2022-08-15 PROCEDURE — 3044F HG A1C LEVEL LT 7.0%: CPT | Performed by: NURSE PRACTITIONER

## 2022-08-15 RX ORDER — POTASSIUM CHLORIDE 20 MEQ/1
40 TABLET, EXTENDED RELEASE ORAL 2 TIMES DAILY
Qty: 90 TABLET | Refills: 0 | Status: SHIPPED
Start: 2022-08-15 | End: 2022-08-16

## 2022-08-15 ASSESSMENT — PATIENT HEALTH QUESTIONNAIRE - PHQ9
SUM OF ALL RESPONSES TO PHQ QUESTIONS 1-9: 0
1. LITTLE INTEREST OR PLEASURE IN DOING THINGS: 0
SUM OF ALL RESPONSES TO PHQ QUESTIONS 1-9: 0
SUM OF ALL RESPONSES TO PHQ9 QUESTIONS 1 & 2: 0
2. FEELING DOWN, DEPRESSED OR HOPELESS: 0

## 2022-08-15 ASSESSMENT — ENCOUNTER SYMPTOMS
ABDOMINAL PAIN: 0
VOMITING: 0
CONSTIPATION: 1
BLOOD IN STOOL: 0
SHORTNESS OF BREATH: 0
COUGH: 0
DIARRHEA: 0
NAUSEA: 0

## 2022-08-15 NOTE — PROGRESS NOTES
8/15/2022    Thelma Thomas (:  1965) is a 55 y.o. female, here for evaluation of the following medical concerns:  Chief Complaint   Patient presents with    New Patient     Had rt side mastectomy 22. Was in hospital for edema in may. Has lost 94 pounds. Starts radiation next week. HPI  Patient is here for a new patient visit. Reports that she does not have a previous PCP. Right breast cancer- following with Dr. Archie Gonsales   S/p right mastectomy/snbx 22 for a 12.9 cm borderline phylloides tumor, negative node. Oncologist is [facility]  Scheduled for radiation to start next week on   Planning 25 treatments. Chronic diastolic heart failure- following with Dr. Michelle King on chronic HF 2022. Echo 22 showed EF 55-60%  Weight was 263 lbs on 22. Has decreased weight significantly. Quit smoking on 22. Was smoking 2 ppd for most of her life. Takes lasix 80 mg daily, lisinopril 5 mg daily and metoprolol succinate 25 mg daily     Follow low salt diet    Weight has been running 172 lbs. Diabetes Mellitus Type 2:     Not on medication therapy. Was dx when weight was 263 lbs. Walking daily for 30 minutes     Home blood sugar records: patient does not test  Any episodes of hypoglycemia? no  Eye exam current (within one year): no  Tobacco history: She  reports that she quit smoking about 3 months ago. Her smoking use included cigarettes. She has a 64.50 pack-year smoking history. She has never used smokeless tobacco.     Hypertension:  Home blood pressure monitoring: No.  She is adherent to a low sodium diet. Patient denies chest pain, shortness of breath, headache, peripheral edema, palpitations, and dry cough. Antihypertensive medication side effects: no medication side effects noted. Hyperlipidemia:  No new myalgias or GI upset on atorvastatin (Lipitor).        Lab Results   Component Value Date    LABA1C 7.0 2022     Lab Results   Component Value Date    LABMICR YES 05/08/2022    CREATININE 0.8 07/11/2022     Lab Results   Component Value Date    ALT 6 (L) 05/08/2022    AST 11 (L) 05/08/2022     Lab Results   Component Value Date    CHOL 111 05/09/2022    TRIG 90 05/09/2022    HDL 26 (L) 05/09/2022    LDLCALC 67 05/09/2022        Iron deficiency anemia-   Has received IV iron in hospital in May 2022. Denies blood in stool. Has not had colonoscopy     Review of Systems   Constitutional:  Negative for activity change and fever. Respiratory:  Negative for cough and shortness of breath. Cardiovascular:  Negative for chest pain, palpitations and leg swelling. Gastrointestinal:  Positive for constipation. Negative for abdominal pain, blood in stool, diarrhea, nausea and vomiting. Skin:         + dry skin that itches    Neurological:  Negative for dizziness, syncope and headaches. Prior to Visit Medications    Medication Sig Taking?  Authorizing Provider   potassium chloride (KLOR-CON M) 20 MEQ extended release tablet Take 1 tablet by mouth daily Yes Xiomara Malin MD   furosemide (LASIX) 80 MG tablet Take 1 tablet by mouth daily Yes Xiomara Malin MD   atorvastatin (LIPITOR) 10 MG tablet Take 1 tablet by mouth daily Yes Isac Hall MD   lisinopril (PRINIVIL;ZESTRIL) 5 MG tablet Take 1 tablet by mouth daily Yes Isac Hall MD   metoprolol succinate (TOPROL XL) 25 MG extended release tablet Take 1 tablet by mouth daily Yes Isac Hall MD        No Known Allergies    Past Medical History:   Diagnosis Date    Breast cancer ([facility] [address]     CHF (congestive heart failure) ([facility] [address]     Hyperlipidemia     Hypertension        Past Surgical History:   Procedure Laterality Date    CYST INCISION AND DRAINAGE      MASTECTOMY Right 7/7/2022    RIGHT MASTECTOMY, RIGHT AXILLARY SENTINEL NODE BIOPSY WITH RADIOISOTOPE performed by Jake Read MD at [address] History     Socioeconomic History Marital status:      Spouse name: Leonel Jung    Number of children: 2    Years of education: Not on file    Highest education level: Not on file   Occupational History    Not on file   Tobacco Use    Smoking status: Former     Packs/day: 1.50     Years: 43.00     Pack years: 64.50     Types: Cigarettes     Quit date: 2022     Years since quittin.2    Smokeless tobacco: Never   Vaping Use    Vaping Use: Never used   Substance and Sexual Activity    Alcohol use: Yes     Alcohol/week: 1.0 - 3.0 standard drink     Types: 1 - 3 Cans of beer per week     Comment: 22 last drink    Drug use: Never    Sexual activity: Yes     Partners: Male     Comment:  Leonel Jung   Other Topics Concern    Not on file   Social History Narrative    Not on file     Social Determinants of Health     Financial Resource Strain: Not on file   Food Insecurity: Not on file   Transportation Needs: Not on file   Physical Activity: Not on file   Stress: Not on file   Social Connections: Not on file   Intimate Partner Violence: Not on file   Housing Stability: Not on file        History reviewed. No pertinent family history. Vitals:    08/15/22 1101   BP: 138/72   Site: Left Upper Arm   Position: Sitting   Cuff Size: Medium Adult   Pulse: 61   Resp: 16   Temp: 98.1 °F (36.7 °C)   TempSrc: Oral   SpO2: 98%   Weight: 172 lb (78 kg)   Height: 5' 5.35\" (1.66 m)     Estimated body mass index is 28.31 kg/m² as calculated from the following:    Height as of this encounter: 5' 5.35\" (1.66 m). Weight as of this encounter: 172 lb (78 kg). Physical Exam  Vitals and nursing note reviewed. Constitutional:       General: She is not in acute distress. Appearance: She is well-developed. HENT:      Head: Normocephalic and atraumatic. Cardiovascular:      Rate and Rhythm: Normal rate and regular rhythm. Heart sounds: Normal heart sounds. No murmur heard. No friction rub. No gallop.    Pulmonary:      Effort: Pulmonary effort is normal. No respiratory distress. Breath sounds: Normal breath sounds. Abdominal:      Palpations: Abdomen is soft. Tenderness: There is no abdominal tenderness. Musculoskeletal:      Cervical back: Neck supple. Right lower leg: No edema. Left lower leg: No edema. Feet:      Right foot:      Protective Sensation: 10 sites tested. 10 sites sensed. Left foot:      Protective Sensation: 10 sites tested. 10 sites sensed. Skin:     General: Skin is warm and dry. Comments: + dry skin    Neurological:      Mental Status: She is alert and oriented to person, place, and time. Psychiatric:         Behavior: Behavior normal.         Thought Content: Thought content normal.         Judgment: Judgment normal.       ASSESSMENT/PLAN:  1. 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, right/ S/P right mastectomy  S/p right mastectomy/snbx 7/7/22 for a 12.9 cm borderline phylloides tumor  Continue f/u with Dr. Amadou Gordon on starting radiation treatment next week with [facility]. 3. Chronic diastolic heart failure (HCC)  Stable. Continue current medications. Continue f/u with cardiologist Dr. Adonis Ramirez     4. Iron deficiency anemia, unspecified iron deficiency anemia type  S/p iron transfusion while in hospital. Will repeat lab  - CBC with Auto Differential; Future  - Ferritin; Future  - Iron and TIBC; Future    5. Controlled type 2 diabetes mellitus without complication, without long-term current use of insulin (HCC)  - POCT glycosylated hemoglobin (Hb A1C)- 5.3%  - Comprehensive Metabolic Panel; Future  - Microalbumin / Creatinine Urine Ratio; Future  -  DIABETES FOOT EXAM  Diet controlled   Has lost significant amount of weight. Advised low carb diet and aerobic exercise     6. Colon cancer screening  Refer to GI for screening colonoscopy   - [facility] - Maria Esther Evans MD, Gastroenterology, [facility]    7.  Cervical cancer screening  Refer to GYN for pap smear   - [facility] - Marisela Rojo MD, Gynecology, [facility]    8. History of tobacco abuse- quit 5/2022  Continue with smoking cessation     Advised to obtain vaccines at her pharmacy. Return in about 3 months (around 11/15/2022), or if symptoms worsen or fail to improve, for chronic conditions. On this date 8/15/2022 I have spent 50 minutes reviewing previous notes, test results and face to face with the patient discussing the diagnosis and importance of compliance with the treatment plan as well as documenting on the day of the visit. An  electronic signature was used to authenticate this note.     --Alberto Roca, APRIL - CNP on 8/15/2022 at 11:03 AM

## 2022-08-16 ENCOUNTER — HOSPITAL ENCOUNTER (EMERGENCY)
Age: 57
Discharge: HOME | End: 2022-08-16
Attending: EMERGENCY MEDICINE
Payer: MEDICARE

## 2022-08-16 VITALS
BODY MASS INDEX: 28.87 KG/M2 | OXYGEN SATURATION: 98 % | DIASTOLIC BLOOD PRESSURE: 97 MMHG | RESPIRATION RATE: 20 BRPM | HEIGHT: 65 IN | TEMPERATURE: 97.8 F | WEIGHT: 173.28 LBS | SYSTOLIC BLOOD PRESSURE: 173 MMHG | HEART RATE: 70 BPM

## 2022-08-16 DIAGNOSIS — E87.6 HYPOKALEMIA: ICD-10-CM

## 2022-08-16 DIAGNOSIS — E87.6 HYPOKALEMIA: Primary | ICD-10-CM

## 2022-08-16 LAB
A/G RATIO: 1.2 (ref 1.1–2.2)
ALBUMIN SERPL-MCNC: 4.2 G/DL (ref 3.4–5)
ALP BLD-CCNC: 128 U/L (ref 40–129)
ALT SERPL-CCNC: 25 U/L (ref 10–40)
ANION GAP SERPL CALCULATED.3IONS-SCNC: 12 MMOL/L (ref 3–16)
ANION GAP SERPL CALCULATED.3IONS-SCNC: 14 MMOL/L (ref 3–16)
AST SERPL-CCNC: 28 U/L (ref 15–37)
BASOPHILS ABSOLUTE: 0.1 K/UL (ref 0–0.2)
BASOPHILS RELATIVE PERCENT: 1.2 %
BILIRUB SERPL-MCNC: 0.5 MG/DL (ref 0–1)
BUN BLDV-MCNC: 11 MG/DL (ref 7–20)
BUN BLDV-MCNC: 12 MG/DL (ref 7–20)
CALCIUM SERPL-MCNC: 9 MG/DL (ref 8.3–10.6)
CALCIUM SERPL-MCNC: 9.2 MG/DL (ref 8.3–10.6)
CHLORIDE BLD-SCNC: 102 MMOL/L (ref 99–110)
CHLORIDE BLD-SCNC: 103 MMOL/L (ref 99–110)
CO2: 30 MMOL/L (ref 21–32)
CO2: 30 MMOL/L (ref 21–32)
CREAT SERPL-MCNC: 0.7 MG/DL (ref 0.6–1.1)
CREAT SERPL-MCNC: 0.9 MG/DL (ref 0.6–1.1)
EOSINOPHILS ABSOLUTE: 0.3 K/UL (ref 0–0.6)
EOSINOPHILS RELATIVE PERCENT: 3.8 %
GFR AFRICAN AMERICAN: >60
GFR AFRICAN AMERICAN: >60
GFR NON-AFRICAN AMERICAN: >60
GFR NON-AFRICAN AMERICAN: >60
GLUCOSE BLD-MCNC: 130 MG/DL (ref 70–99)
GLUCOSE BLD-MCNC: 152 MG/DL (ref 70–99)
HCT VFR BLD CALC: 34.3 % (ref 36–48)
HEMOGLOBIN: 12.3 G/DL (ref 12–16)
LYMPHOCYTES ABSOLUTE: 2.3 K/UL (ref 1–5.1)
LYMPHOCYTES RELATIVE PERCENT: 31.3 %
MCH RBC QN AUTO: 29 PG (ref 26–34)
MCHC RBC AUTO-ENTMCNC: 35.9 G/DL (ref 31–36)
MCV RBC AUTO: 80.6 FL (ref 80–100)
MONOCYTES ABSOLUTE: 0.5 K/UL (ref 0–1.3)
MONOCYTES RELATIVE PERCENT: 6.7 %
NEUTROPHILS ABSOLUTE: 4.3 K/UL (ref 1.7–7.7)
NEUTROPHILS RELATIVE PERCENT: 57 %
PDW BLD-RTO: 14.4 % (ref 12.4–15.4)
PLATELET # BLD: 227 K/UL (ref 135–450)
PMV BLD AUTO: 7.7 FL (ref 5–10.5)
POTASSIUM SERPL-SCNC: 2.5 MMOL/L (ref 3.5–5.1)
POTASSIUM SERPL-SCNC: 2.7 MMOL/L (ref 3.5–5.1)
RBC # BLD: 4.26 M/UL (ref 4–5.2)
SODIUM BLD-SCNC: 144 MMOL/L (ref 136–145)
SODIUM BLD-SCNC: 147 MMOL/L (ref 136–145)
TOTAL PROTEIN: 7.7 G/DL (ref 6.4–8.2)
WBC # BLD: 7.5 K/UL (ref 4–11)

## 2022-08-16 PROCEDURE — 96366 THER/PROPH/DIAG IV INF ADDON: CPT

## 2022-08-16 PROCEDURE — 80053 COMPREHEN METABOLIC PANEL: CPT

## 2022-08-16 PROCEDURE — 96365 THER/PROPH/DIAG IV INF INIT: CPT

## 2022-08-16 PROCEDURE — 6370000000 HC RX 637 (ALT 250 FOR IP): Performed by: EMERGENCY MEDICINE

## 2022-08-16 PROCEDURE — 99284 EMERGENCY DEPT VISIT MOD MDM: CPT

## 2022-08-16 PROCEDURE — 85025 COMPLETE CBC W/AUTO DIFF WBC: CPT

## 2022-08-16 PROCEDURE — 6360000002 HC RX W HCPCS: Performed by: EMERGENCY MEDICINE

## 2022-08-16 RX ORDER — POTASSIUM CHLORIDE 20 MEQ/1
40 TABLET, EXTENDED RELEASE ORAL 2 TIMES DAILY
Qty: 40 TABLET | Refills: 1 | Status: SHIPPED | OUTPATIENT
Start: 2022-08-16 | End: 2022-08-25

## 2022-08-16 RX ORDER — POTASSIUM CHLORIDE 7.45 MG/ML
10 INJECTION INTRAVENOUS
Status: COMPLETED | OUTPATIENT
Start: 2022-08-16 | End: 2022-08-16

## 2022-08-16 RX ORDER — POTASSIUM CHLORIDE 750 MG/1
40 TABLET, FILM COATED, EXTENDED RELEASE ORAL ONCE
Status: COMPLETED | OUTPATIENT
Start: 2022-08-16 | End: 2022-08-16

## 2022-08-16 RX ORDER — POTASSIUM CHLORIDE 20 MEQ/1
20 TABLET, EXTENDED RELEASE ORAL DAILY
COMMUNITY
End: 2022-08-16

## 2022-08-16 RX ADMIN — POTASSIUM CHLORIDE 40 MEQ: 750 TABLET, FILM COATED, EXTENDED RELEASE ORAL at 17:59

## 2022-08-16 RX ADMIN — Medication 10 MEQ: at 19:09

## 2022-08-16 RX ADMIN — Medication 10 MEQ: at 17:59

## 2022-08-16 ASSESSMENT — PAIN - FUNCTIONAL ASSESSMENT: PAIN_FUNCTIONAL_ASSESSMENT: NONE - DENIES PAIN

## 2022-08-16 NOTE — PROGRESS NOTES
Pharmacy Medication Reconciliation Note     List of medications Kendall Ayers is currently taking is complete. Source of information:   1. Conversation with patient at bedside  2. EMR    Notes regarding home medications:   1.  Patient reports taking all AM meds PTA in the ED    Patient denies taking any OTC or herbal medications    Lorene Yañez, Pharmacy Intern  8/16/2022  7:37 PM

## 2022-08-17 NOTE — DISCHARGE INSTRUCTIONS
You should continue the potassium 40 mill equivalents 2 times daily. You should have your potassium rechecked within 48 to 72 hours. Your physician will instruct you on your ongoing dose of potassium. Do take another dose of 40 mEq before you go to bed tonight. Return if any new problems of concern.

## 2022-08-17 NOTE — ED NOTES
Patient ambulated with steady gait to lobby.   Patient stated that she was going to check on her son that is also being seen     Olesya Zacarias, RENAE  08/16/22 2545

## 2022-08-17 NOTE — ED PROVIDER NOTES
WITH RADIOISOTOPE performed by Nelsy Willingham MD at [address] [place] [address]       Previous Medications    ATORVASTATIN (LIPITOR) 10 MG TABLET    Take 1 tablet by mouth daily    FUROSEMIDE (LASIX) 80 MG TABLET    Take 1 tablet by mouth daily    LISINOPRIL (PRINIVIL;ZESTRIL) 5 MG TABLET    Take 1 tablet by mouth daily    METOPROLOL SUCCINATE (TOPROL XL) 25 MG EXTENDED RELEASE TABLET    Take 1 tablet by mouth daily       ALLERGIES     Patient has no known allergies. FAMILY HISTORY     History reviewed. No pertinent family history. SOCIAL HISTORY       Social History     Socioeconomic History    Marital status:      Spouse name: Lodema Nageotte    Number of children: 2    Years of education: None    Highest education level: None   Tobacco Use    Smoking status: Former     Packs/day: 1.50     Years: 43.00     Pack years: 64.50     Types: Cigarettes     Quit date: 2022     Years since quittin.2    Smokeless tobacco: Never   Vaping Use    Vaping Use: Never used   Substance and Sexual Activity    Alcohol use: Yes     Alcohol/week: 1.0 - 3.0 standard drink     Types: 1 - 3 Cans of beer per week     Comment: 22 last drink    Drug use: Never    Sexual activity: Yes     Partners: Male     Comment:  Ben         PHYSICAL EXAM    (up to 7 for level 4, 8 or more for level 5)     ED Triage Vitals   BP Temp Temp Source Heart Rate Resp SpO2 Height Weight   22 1516 22 1516 22 1516 22 1516 22 1516 -- 22 1513 22 1513   (!) 197/91 97.8 °F (36.6 °C) Oral 78 18  5' 5\" (1.651 m) 173 lb 4.5 oz (78.6 kg)       General: Alert white female in no acute distress. Head: Atraumatic and normocephalic. Eyes: No conjunctival injection. No pallor. Pupils equal round reactive. ENT: Cinderella Mauro is clear. Oropharynx is moist without erythema. Neck: Supple, nontender, no adenopathy. Heart: Regular rate and rhythm.   No murmurs or gallops noted.  Lungs: Breath sounds equal bilaterally and clear. Abdomen: Soft, nondistended, nontender. No masses organomegaly. Bowel sounds are normal.  Musculoskeletal: No edema. Intact distal pulses. Skin: Warm and dry, good turgor. No pallor or cyanosis. Neuro: Awake, alert, oriented. Symmetrical reactive pupils. Intact extraocular movements. No facial asymmetry. Normal gait. DIFFERENTIAL DIAGNOSIS   Differential includes but is not limited to hypokalemia, other. DIAGNOSTIC RESULTS     EKG: All EKG's are interpreted by Mo Marcus MD in the absence of a cardiologist.    Normal sinus rhythm, rate of 66, normal EKG. Rhythm strip shows sinus rhythm with a rate of 66, LA interval 172 ms, QRS of 84 ms with no other ectopy as interpreted by me. This compares to an EKG dated 7/7/2022 no significant change noted. RADIOLOGY:   Non-plain film images such as CT, Ultrasound and MRI are read by the radiologist. Plain radiographic images are visualized and preliminarily interpreted Mo Marcus MD with the below findings:      Interpretation per the Radiologist below, if available at the time of this note:    No orders to display         ED BEDSIDE ULTRASOUND:   Performed by ED Physician - none    LABS:  Labs Reviewed   CBC WITH AUTO DIFFERENTIAL - Abnormal; Notable for the following components:       Result Value    Hematocrit 34.3 (*)     All other components within normal limits   COMPREHENSIVE METABOLIC PANEL - Abnormal; Notable for the following components:    Potassium 2.5 (*)     Glucose 130 (*)     All other components within normal limits    Narrative:     Vikas Jonasy tel. [phone],  Chemistry results called to and read back by Rohan Eason, 08/16/2022 16:14, by [facility]       All other labs were within normal range or not returned as of this dictation.     EMERGENCY DEPARTMENT COURSE and DIFFERENTIAL DIAGNOSIS/MDM:   Vitals:    Vitals:    08/16/22 1513 08/16/22 1516   BP:  (!) 197/91   Pulse:  78   Resp:  18   Temp:  97.8 °F (36.6 °C)   TempSrc:  Oral   Weight: 173 lb 4.5 oz (78.6 kg)    Height: 5' 5\" (1.651 m)        This patient presents for potassium replacement. She had lab done yesterday. Her potassium was 2.6.  2.5 today. It was 2.8 on 1 July, and 3.1 on 11 July. She was only on 20 mill equivalents a day. She was increased to 40 mEq twice daily yesterday evening. She was given 20 mill equivalents through peripheral IV. She was given 40 mill equivalents p.o. She will continue the 40 mEq twice daily. I told her she needs to have her potassium rechecked within 48 to 72 hours. She will follow-up with her primary care provider. I told her based on her repeat potassium levels her provider will determine her ongoing potassium replacement requirements. Return for any new symptoms of concern. Test results, diagnosis, and treatment plan were discussed the patient. She understands treatment plan follow-up as discussed. CONSULTS:  None    PROCEDURES:  None    FINAL IMPRESSION      1. Hypokalemia          DISPOSITION/PLAN   DISPOSITION Decision To Discharge 08/16/2022 08:23:26 PM      PATIENT REFERRED TO:  APRIL Castellanos - CNP  [address] [place] [address]  [phone]    In 2 days  For repeat potassium level      DISCHARGE MEDICATIONS:  New Prescriptions    POTASSIUM CHLORIDE (KLOR-CON M) 20 MEQ EXTENDED RELEASE TABLET    Take 2 tablets by mouth in the morning and 2 tablets before bedtime.        (Please note that portions of this note were completed with a voice recognition program.  Efforts were made to edit the dictations but occasionally words are mis-transcribed.)    Macy Jarrett MD  Attending Emergency Physician        Anila Quispe MD  08/16/22 2032

## 2022-08-18 ENCOUNTER — TELEPHONE (OUTPATIENT)
Dept: FAMILY MEDICINE CLINIC | Age: 57
End: 2022-08-18

## 2022-08-18 DIAGNOSIS — E87.6 HYPOKALEMIA: Primary | ICD-10-CM

## 2022-08-18 NOTE — TELEPHONE ENCOUNTER
PT WAS SEEN IN ER FOR LOW POTASSIUM SHE HAS A ED FU ON 8/24/2022 PT WAS TOLD SHE NEEDS TO HAVE THE POTASSIUM RECHECKED IN 48-72 HRS AND TOMORROW IS THE 3RD DAY SHE IS WANTING TO GO TODAY OR TOMORROW TO THE LAB TO GET BLOOD DRAWN FOR THE RE CHECK       PLEASE ADVISE

## 2022-08-19 DIAGNOSIS — E87.6 HYPOKALEMIA: ICD-10-CM

## 2022-08-19 LAB
ANION GAP SERPL CALCULATED.3IONS-SCNC: 14 MMOL/L (ref 3–16)
BUN BLDV-MCNC: 13 MG/DL (ref 7–20)
CALCIUM SERPL-MCNC: 9.6 MG/DL (ref 8.3–10.6)
CHLORIDE BLD-SCNC: 102 MMOL/L (ref 99–110)
CO2: 30 MMOL/L (ref 21–32)
CREAT SERPL-MCNC: 0.8 MG/DL (ref 0.6–1.1)
GFR AFRICAN AMERICAN: >60
GFR NON-AFRICAN AMERICAN: >60
GLUCOSE BLD-MCNC: 110 MG/DL (ref 70–99)
POTASSIUM SERPL-SCNC: 3.2 MMOL/L (ref 3.5–5.1)
SODIUM BLD-SCNC: 146 MMOL/L (ref 136–145)

## 2022-08-24 DIAGNOSIS — E87.6 HYPOKALEMIA: Primary | ICD-10-CM

## 2022-08-25 DIAGNOSIS — E87.6 HYPOKALEMIA: ICD-10-CM

## 2022-08-25 DIAGNOSIS — E87.6 HYPOKALEMIA: Primary | ICD-10-CM

## 2022-08-25 LAB
ANION GAP SERPL CALCULATED.3IONS-SCNC: 13 MMOL/L (ref 3–16)
BUN BLDV-MCNC: 10 MG/DL (ref 7–20)
CALCIUM SERPL-MCNC: 9.8 MG/DL (ref 8.3–10.6)
CHLORIDE BLD-SCNC: 101 MMOL/L (ref 99–110)
CO2: 27 MMOL/L (ref 21–32)
CREAT SERPL-MCNC: 0.7 MG/DL (ref 0.6–1.1)
GFR AFRICAN AMERICAN: >60
GFR NON-AFRICAN AMERICAN: >60
GLUCOSE BLD-MCNC: 114 MG/DL (ref 70–99)
POTASSIUM SERPL-SCNC: 2.9 MMOL/L (ref 3.5–5.1)
SODIUM BLD-SCNC: 141 MMOL/L (ref 136–145)

## 2022-08-25 RX ORDER — POTASSIUM CHLORIDE 20 MEQ/1
40 TABLET, EXTENDED RELEASE ORAL 3 TIMES DAILY
Qty: 180 TABLET | Refills: 0 | Status: SHIPPED | OUTPATIENT
Start: 2022-08-25 | End: 2022-09-08

## 2022-08-26 ENCOUNTER — TELEPHONE (OUTPATIENT)
Dept: CARDIOLOGY CLINIC | Age: 57
End: 2022-08-26

## 2022-08-26 DIAGNOSIS — E87.6 HYPOKALEMIA: ICD-10-CM

## 2022-08-26 RX ORDER — POTASSIUM CHLORIDE 20 MEQ/1
40 TABLET, EXTENDED RELEASE ORAL 3 TIMES DAILY
Qty: 180 TABLET | Refills: 0 | OUTPATIENT
Start: 2022-08-26

## 2022-08-26 NOTE — TELEPHONE ENCOUNTER
Called and spoke to [place] and she states that he PCP has increase K to 40 meq TID and she is going to get labs checked on Monday. Advised her that she could decrease Lasix to 40 mg until then but if she notices any increased dyspnea, weight gain or swelling she should resume the 80 mg daily. She verbalized understanding.

## 2022-08-26 NOTE — TELEPHONE ENCOUNTER
[facility] called in this morning, she states her potassium keeps being low and she keeps getting sent to the ER for potassium IV's she would like to know if something can be done with her Lasix so her potassium will stop being so low. She can be reached at [phone].

## 2022-08-30 ENCOUNTER — TELEPHONE (OUTPATIENT)
Dept: FAMILY MEDICINE CLINIC | Age: 57
End: 2022-08-30

## 2022-08-30 DIAGNOSIS — E87.6 HYPOKALEMIA: ICD-10-CM

## 2022-08-30 DIAGNOSIS — E87.6 HYPOKALEMIA: Primary | ICD-10-CM

## 2022-08-30 LAB
ANION GAP SERPL CALCULATED.3IONS-SCNC: 13 MMOL/L (ref 3–16)
BUN BLDV-MCNC: 11 MG/DL (ref 7–20)
CALCIUM SERPL-MCNC: 9.6 MG/DL (ref 8.3–10.6)
CHLORIDE BLD-SCNC: 105 MMOL/L (ref 99–110)
CO2: 24 MMOL/L (ref 21–32)
CREAT SERPL-MCNC: 0.8 MG/DL (ref 0.6–1.1)
GFR AFRICAN AMERICAN: >60
GFR NON-AFRICAN AMERICAN: >60
GLUCOSE BLD-MCNC: 139 MG/DL (ref 70–99)
POTASSIUM SERPL-SCNC: 3.9 MMOL/L (ref 3.5–5.1)
SODIUM BLD-SCNC: 142 MMOL/L (ref 136–145)

## 2022-08-30 NOTE — TELEPHONE ENCOUNTER
----- Message from Josy Thomason sent at 8/30/2022  9:05 AM EDT -----  Subject: Message to Provider    QUESTIONS  Information for Provider? Patient is waiting for orders to get her   potassium checked  ---------------------------------------------------------------------------  --------------  Regis ONEILL  [phone]; OK to leave message on voicemail  ---------------------------------------------------------------------------  --------------  SCRIPT ANSWERS  Relationship to Patient?  Self

## 2022-08-30 NOTE — TELEPHONE ENCOUNTER
----- Message from [facility] sent at 8/29/2022  8:39 AM EDT -----  Subject: Message to Provider    QUESTIONS  Information for Provider? patient spoke with Tiara Castrejon late   Thursday and lab orders still need to be placed in chart for Potassium   (BMP) please call patient when order is placed  ---------------------------------------------------------------------------  --------------  [address] [employer]  [phone]; OK to leave message on voicemail  ---------------------------------------------------------------------------  --------------  SCRIPT ANSWERS  Relationship to Patient?  Self

## 2022-08-30 NOTE — TELEPHONE ENCOUNTER
----- Message from Josy Thomason sent at 8/30/2022  9:06 AM EDT -----  Subject: Referral Request    Reason for referral request? Patient needs orders to get her potassium   checked  Provider patient wants to be referred to(if known):     Provider Phone Number(if known):     Additional Information for Provider?   ---------------------------------------------------------------------------  --------------  [id number]    [phone]; OK to leave message on voicemail  ---------------------------------------------------------------------------  --------------

## 2022-08-30 NOTE — TELEPHONE ENCOUNTER
Patient was advised to go to er but didn't. . She has been taking potassium as advised. Can repeat order be placed to check for potassium? Please advise.   Thanks

## 2022-08-30 NOTE — TELEPHONE ENCOUNTER
----- Message from [facility] sent at 8/29/2022  8:39 AM EDT -----  Subject: Message to Provider    QUESTIONS  Information for Provider? patient spoke with Elaine Garcia late   Thursday and lab orders still need to be placed in chart for Potassium   (BMP) please call patient when order is placed  ---------------------------------------------------------------------------  --------------  [address] [employer]  [phone]; OK to leave message on voicemail  ---------------------------------------------------------------------------  --------------  SCRIPT ANSWERS  Relationship to Patient?  Self

## 2022-09-08 ENCOUNTER — OFFICE VISIT (OUTPATIENT)
Dept: FAMILY MEDICINE CLINIC | Age: 57
End: 2022-09-08
Payer: MEDICARE

## 2022-09-08 VITALS
HEART RATE: 69 BPM | OXYGEN SATURATION: 99 % | BODY MASS INDEX: 29.16 KG/M2 | DIASTOLIC BLOOD PRESSURE: 80 MMHG | RESPIRATION RATE: 16 BRPM | SYSTOLIC BLOOD PRESSURE: 130 MMHG | TEMPERATURE: 97.9 F | HEIGHT: 65 IN | WEIGHT: 175 LBS

## 2022-09-08 DIAGNOSIS — I50.32 CHRONIC DIASTOLIC HEART FAILURE (HCC): ICD-10-CM

## 2022-09-08 DIAGNOSIS — E87.6 HYPOKALEMIA: ICD-10-CM

## 2022-09-08 DIAGNOSIS — E87.6 HYPOKALEMIA: Primary | ICD-10-CM

## 2022-09-08 LAB
ANION GAP SERPL CALCULATED.3IONS-SCNC: 12 MMOL/L (ref 3–16)
BUN BLDV-MCNC: 12 MG/DL (ref 7–20)
CALCIUM SERPL-MCNC: 10 MG/DL (ref 8.3–10.6)
CHLORIDE BLD-SCNC: 105 MMOL/L (ref 99–110)
CO2: 27 MMOL/L (ref 21–32)
CREAT SERPL-MCNC: 0.7 MG/DL (ref 0.6–1.1)
GFR AFRICAN AMERICAN: >60
GFR NON-AFRICAN AMERICAN: >60
GLUCOSE BLD-MCNC: 101 MG/DL (ref 70–99)
POTASSIUM SERPL-SCNC: 3.4 MMOL/L (ref 3.5–5.1)
SODIUM BLD-SCNC: 144 MMOL/L (ref 136–145)

## 2022-09-08 PROCEDURE — 99213 OFFICE O/P EST LOW 20 MIN: CPT | Performed by: NURSE PRACTITIONER

## 2022-09-08 PROCEDURE — G8427 DOCREV CUR MEDS BY ELIG CLIN: HCPCS | Performed by: NURSE PRACTITIONER

## 2022-09-08 PROCEDURE — 1036F TOBACCO NON-USER: CPT | Performed by: NURSE PRACTITIONER

## 2022-09-08 PROCEDURE — 3017F COLORECTAL CA SCREEN DOC REV: CPT | Performed by: NURSE PRACTITIONER

## 2022-09-08 PROCEDURE — G8417 CALC BMI ABV UP PARAM F/U: HCPCS | Performed by: NURSE PRACTITIONER

## 2022-09-08 RX ORDER — POTASSIUM CHLORIDE 20 MEQ/1
TABLET, EXTENDED RELEASE ORAL
Qty: 180 TABLET | Refills: 0
Start: 2022-09-08 | End: 2022-09-22 | Stop reason: SDUPTHER

## 2022-09-08 SDOH — ECONOMIC STABILITY: FOOD INSECURITY: WITHIN THE PAST 12 MONTHS, THE FOOD YOU BOUGHT JUST DIDN'T LAST AND YOU DIDN'T HAVE MONEY TO GET MORE.: NEVER TRUE

## 2022-09-08 SDOH — ECONOMIC STABILITY: FOOD INSECURITY: WITHIN THE PAST 12 MONTHS, YOU WORRIED THAT YOUR FOOD WOULD RUN OUT BEFORE YOU GOT MONEY TO BUY MORE.: NEVER TRUE

## 2022-09-08 ASSESSMENT — ENCOUNTER SYMPTOMS
CHEST TIGHTNESS: 0
SHORTNESS OF BREATH: 0

## 2022-09-08 ASSESSMENT — SOCIAL DETERMINANTS OF HEALTH (SDOH): HOW HARD IS IT FOR YOU TO PAY FOR THE VERY BASICS LIKE FOOD, HOUSING, MEDICAL CARE, AND HEATING?: NOT HARD AT ALL

## 2022-09-08 NOTE — PROGRESS NOTES
tablet Take 1 tablet by mouth daily (Patient taking differently: Take 40 mg by mouth daily) 30 tablet 3    atorvastatin (LIPITOR) 10 MG tablet Take 1 tablet by mouth daily 30 tablet 3    lisinopril (PRINIVIL;ZESTRIL) 5 MG tablet Take 1 tablet by mouth daily 30 tablet 3    metoprolol succinate (TOPROL XL) 25 MG extended release tablet Take 1 tablet by mouth daily 30 tablet 3     No current facility-administered medications for this visit. No Known Allergies    Review of Systems   Respiratory:  Negative for chest tightness and shortness of breath. Cardiovascular:  Negative for chest pain, palpitations and leg swelling. Genitourinary:  Negative for decreased urine volume. Skin: Negative. Neurological:  Negative for dizziness and light-headedness. Vitals:    09/08/22 0905 09/08/22 0935   BP: (!) 142/86 130/80   Site: Left Upper Arm Left Upper Arm   Position: Sitting Sitting   Cuff Size: Medium Adult Medium Adult   Pulse: 69    Resp: 16    Temp: 97.9 °F (36.6 °C)    TempSrc: Oral    SpO2: 99%    Weight: 175 lb (79.4 kg)    Height: 5' 5\" (1.651 m)        Body mass index is 29.12 kg/m². Wt Readings from Last 3 Encounters:   09/08/22 175 lb (79.4 kg)   08/16/22 173 lb 4.5 oz (78.6 kg)   08/15/22 172 lb (78 kg)       BP Readings from Last 3 Encounters:   09/08/22 (!) 142/86   08/16/22 (!) 173/97   08/15/22 138/72       Physical Exam  Constitutional:       Appearance: Normal appearance. HENT:      Head: Normocephalic. Cardiovascular:      Rate and Rhythm: Normal rate and regular rhythm. Pulses: Normal pulses. Heart sounds: Normal heart sounds. Pulmonary:      Effort: Pulmonary effort is normal.      Breath sounds: Normal breath sounds. Musculoskeletal:         General: No swelling. Right lower leg: No edema. Left lower leg: No edema. Skin:     General: Skin is warm and dry. Neurological:      Mental Status: She is alert and oriented to person, place, and time. [address] was [address] today for follow-up. Diagnoses and all orders for this visit:    Hypokalemia  -     Basic Metabolic Panel; Future  -instructed to recheck BMP today; will notify patient if there is an abnormal K+ level  -40 mEq K+ TID currently    Chronic diastolic heart failure ([facility] [address]  -     Basic Metabolic Panel; Future  -lasix 40 mg daily currently- instructed patient to call with increased weight, edema, cough, shortness of breath  -anticipatory guidance provided about notifying the provider with worsening s/s of HF. Notify provider if 3 lbs. Weight gain in a day or 5 lbs. Weight gain in a week. - Continue lisinopril and metoprolol   - Continue f/u with cardiologist Dr. Heather Palmer     Follow up appointment scheduled in November 2022      Return in about 2 months (around 11/15/2022), or if symptoms worsen or fail to improve, for chronic conditions.     Pt seen with APRIL Cronin

## 2022-09-13 RX ORDER — LISINOPRIL 5 MG/1
5 TABLET ORAL DAILY
Qty: 30 TABLET | Refills: 2 | Status: SHIPPED | OUTPATIENT
Start: 2022-09-13 | End: 2022-11-10 | Stop reason: SDUPTHER

## 2022-09-13 RX ORDER — METOPROLOL SUCCINATE 25 MG/1
25 TABLET, EXTENDED RELEASE ORAL DAILY
Qty: 30 TABLET | Refills: 2 | Status: SHIPPED | OUTPATIENT
Start: 2022-09-13 | End: 2022-11-10 | Stop reason: SDUPTHER

## 2022-09-13 RX ORDER — ATORVASTATIN CALCIUM 10 MG/1
10 TABLET, FILM COATED ORAL DAILY
Qty: 30 TABLET | Refills: 2 | Status: SHIPPED | OUTPATIENT
Start: 2022-09-13 | End: 2022-11-10 | Stop reason: SDUPTHER

## 2022-09-15 DIAGNOSIS — E87.6 HYPOKALEMIA: ICD-10-CM

## 2022-09-15 LAB
ANION GAP SERPL CALCULATED.3IONS-SCNC: 14 MMOL/L (ref 3–16)
BUN BLDV-MCNC: 14 MG/DL (ref 7–20)
CALCIUM SERPL-MCNC: 9.7 MG/DL (ref 8.3–10.6)
CHLORIDE BLD-SCNC: 105 MMOL/L (ref 99–110)
CO2: 24 MMOL/L (ref 21–32)
CREAT SERPL-MCNC: 0.8 MG/DL (ref 0.6–1.1)
GFR AFRICAN AMERICAN: >60
GFR NON-AFRICAN AMERICAN: >60
GLUCOSE BLD-MCNC: 112 MG/DL (ref 70–99)
POTASSIUM SERPL-SCNC: 3.9 MMOL/L (ref 3.5–5.1)
SODIUM BLD-SCNC: 143 MMOL/L (ref 136–145)

## 2022-09-20 DIAGNOSIS — E87.6 HYPOKALEMIA: ICD-10-CM

## 2022-09-20 RX ORDER — POTASSIUM CHLORIDE 20 MEQ/1
TABLET, EXTENDED RELEASE ORAL
Qty: 180 TABLET | Refills: 0 | OUTPATIENT
Start: 2022-09-20

## 2022-09-21 DIAGNOSIS — E87.6 HYPOKALEMIA: ICD-10-CM

## 2022-09-21 RX ORDER — POTASSIUM CHLORIDE 1500 MG/1
TABLET, EXTENDED RELEASE ORAL
Qty: 180 TABLET | Refills: 0 | OUTPATIENT
Start: 2022-09-21

## 2022-09-22 DIAGNOSIS — E87.6 HYPOKALEMIA: ICD-10-CM

## 2022-09-22 RX ORDER — POTASSIUM CHLORIDE 20 MEQ/1
TABLET, EXTENDED RELEASE ORAL
Qty: 180 TABLET | Refills: 0 | Status: SHIPPED | OUTPATIENT
Start: 2022-09-22 | End: 2022-10-18

## 2022-09-23 RX ORDER — POTASSIUM CHLORIDE 20 MEQ/1
TABLET, EXTENDED RELEASE ORAL
Qty: 180 TABLET | Refills: 0 | OUTPATIENT
Start: 2022-09-23

## 2022-09-23 RX ORDER — FUROSEMIDE 80 MG
TABLET ORAL
Qty: 90 TABLET | Refills: 3 | Status: SHIPPED | OUTPATIENT
Start: 2022-09-23

## 2022-09-23 NOTE — TELEPHONE ENCOUNTER
Medication was refilled on 9/22. Potassium level was normal on 9/15. I ordered another BMP to be completed next week.

## 2022-09-26 ENCOUNTER — TELEPHONE (OUTPATIENT)
Dept: ADMINISTRATIVE | Age: 57
End: 2022-09-26

## 2022-09-26 DIAGNOSIS — E87.6 HYPOKALEMIA: ICD-10-CM

## 2022-09-26 LAB
ANION GAP SERPL CALCULATED.3IONS-SCNC: 16 MMOL/L (ref 3–16)
BUN BLDV-MCNC: 12 MG/DL (ref 7–20)
CALCIUM SERPL-MCNC: 10 MG/DL (ref 8.3–10.6)
CHLORIDE BLD-SCNC: 105 MMOL/L (ref 99–110)
CO2: 24 MMOL/L (ref 21–32)
CREAT SERPL-MCNC: 0.8 MG/DL (ref 0.6–1.1)
GFR AFRICAN AMERICAN: >60
GFR NON-AFRICAN AMERICAN: >60
GLUCOSE BLD-MCNC: 92 MG/DL (ref 70–99)
POTASSIUM SERPL-SCNC: 3.6 MMOL/L (ref 3.5–5.1)
SODIUM BLD-SCNC: 145 MMOL/L (ref 136–145)

## 2022-09-26 RX ORDER — POTASSIUM CHLORIDE 20 MEQ/1
TABLET, EXTENDED RELEASE ORAL
Qty: 180 TABLET | Refills: 0 | OUTPATIENT
Start: 2022-09-26

## 2022-09-26 NOTE — TELEPHONE ENCOUNTER
Submitted PA for Potassium [facility]   Via CMM Key: [id number] STATUS: \"Please advise the dispensing pharmacy to contact the [address] at [phone] for assistance. \"    If this requires a response please respond to the pool. [address]). Please advise patient thank you.

## 2022-09-27 DIAGNOSIS — E87.6 HYPOKALEMIA: Primary | ICD-10-CM

## 2022-10-18 DIAGNOSIS — E87.6 HYPOKALEMIA: ICD-10-CM

## 2022-10-18 RX ORDER — POTASSIUM CHLORIDE 1500 MG/1
TABLET, EXTENDED RELEASE ORAL
Qty: 210 TABLET | Refills: 0 | Status: SHIPPED | OUTPATIENT
Start: 2022-10-18

## 2022-10-20 DIAGNOSIS — E87.6 HYPOKALEMIA: ICD-10-CM

## 2022-10-21 RX ORDER — POTASSIUM CHLORIDE 1500 MG/1
TABLET, EXTENDED RELEASE ORAL
Qty: 180 TABLET | OUTPATIENT
Start: 2022-10-21

## 2022-10-21 RX ORDER — POTASSIUM CHLORIDE 20 MEQ/1
TABLET, EXTENDED RELEASE ORAL
Qty: 210 TABLET | Refills: 0 | OUTPATIENT
Start: 2022-10-21

## 2022-10-25 DIAGNOSIS — E87.6 HYPOKALEMIA: ICD-10-CM

## 2022-10-25 LAB
ANION GAP SERPL CALCULATED.3IONS-SCNC: 16 MMOL/L (ref 3–16)
BUN BLDV-MCNC: 15 MG/DL (ref 7–20)
CALCIUM SERPL-MCNC: 9.8 MG/DL (ref 8.3–10.6)
CHLORIDE BLD-SCNC: 104 MMOL/L (ref 99–110)
CO2: 24 MMOL/L (ref 21–32)
CREAT SERPL-MCNC: 0.8 MG/DL (ref 0.6–1.1)
GFR SERPL CREATININE-BSD FRML MDRD: >60 ML/MIN/{1.73_M2}
GLUCOSE BLD-MCNC: 134 MG/DL (ref 70–99)
POTASSIUM SERPL-SCNC: 3.8 MMOL/L (ref 3.5–5.1)
SODIUM BLD-SCNC: 144 MMOL/L (ref 136–145)

## 2022-11-07 ENCOUNTER — TELEPHONE (OUTPATIENT)
Dept: FAMILY MEDICINE CLINIC | Age: 57
End: 2022-11-07

## 2022-11-07 NOTE — TELEPHONE ENCOUNTER
Attempted to schedule.  Patient said it's always high when she goes to [facility]    She has an appointment on 11/15/22 & would like to wait until then     [facility]

## 2022-11-07 NOTE — TELEPHONE ENCOUNTER
[facility] called wanted to make [place] aware of the pt /100  Pt has appointment on 11/15/2022 [facility]

## 2022-11-10 RX ORDER — ATORVASTATIN CALCIUM 10 MG/1
10 TABLET, FILM COATED ORAL DAILY
Qty: 30 TABLET | Refills: 0 | Status: SHIPPED | OUTPATIENT
Start: 2022-11-10 | End: 2023-01-06

## 2022-11-10 RX ORDER — METOPROLOL SUCCINATE 25 MG/1
25 TABLET, EXTENDED RELEASE ORAL DAILY
Qty: 30 TABLET | Refills: 0 | Status: SHIPPED | OUTPATIENT
Start: 2022-11-10 | End: 2023-01-06

## 2022-11-10 RX ORDER — LISINOPRIL 5 MG/1
5 TABLET ORAL DAILY
Qty: 30 TABLET | Refills: 0 | Status: SHIPPED | OUTPATIENT
Start: 2022-11-10 | End: 2023-01-06

## 2022-11-16 DIAGNOSIS — E87.6 HYPOKALEMIA: ICD-10-CM

## 2022-11-17 DIAGNOSIS — E87.6 HYPOKALEMIA: ICD-10-CM

## 2022-11-17 RX ORDER — POTASSIUM CHLORIDE 1500 MG/1
TABLET, EXTENDED RELEASE ORAL
Qty: 210 TABLET | Refills: 0 | OUTPATIENT
Start: 2022-11-17

## 2022-11-17 RX ORDER — POTASSIUM CHLORIDE 20 MEQ/1
TABLET, EXTENDED RELEASE ORAL
Qty: 210 TABLET | Refills: 0 | Status: SHIPPED | OUTPATIENT
Start: 2022-11-17 | End: 2022-12-15

## 2022-12-13 ENCOUNTER — COMMUNITY OUTREACH (OUTPATIENT)
Dept: FAMILY MEDICINE CLINIC | Age: 57
End: 2022-12-13

## 2022-12-13 NOTE — PROGRESS NOTES
Patient's HM shows they are overdue for Mammogram Screening. Care Everywhere and  files searched. No results to attach to order nor HM updated.

## 2022-12-15 ENCOUNTER — TELEPHONE (OUTPATIENT)
Dept: FAMILY MEDICINE CLINIC | Age: 57
End: 2022-12-15

## 2022-12-15 DIAGNOSIS — E87.6 HYPOKALEMIA: ICD-10-CM

## 2022-12-15 RX ORDER — POTASSIUM CHLORIDE 20 MEQ/1
TABLET, EXTENDED RELEASE ORAL
Qty: 210 TABLET | Refills: 0 | Status: SHIPPED | OUTPATIENT
Start: 2022-12-15

## 2022-12-15 NOTE — TELEPHONE ENCOUNTER
Spoke to patient and let her know the potassium was refilled and that she needs to keep her upcoming appointment.

## 2022-12-15 NOTE — TELEPHONE ENCOUNTER
Lvm for patient to call office. Her potassium has been refilled but she needs to keep her upcoming appointment.

## 2022-12-28 ENCOUNTER — OFFICE VISIT (OUTPATIENT)
Dept: FAMILY MEDICINE CLINIC | Age: 57
End: 2022-12-28
Payer: MEDICARE

## 2022-12-28 VITALS
RESPIRATION RATE: 14 BRPM | BODY MASS INDEX: 30.45 KG/M2 | WEIGHT: 183 LBS | OXYGEN SATURATION: 95 % | HEART RATE: 90 BPM | DIASTOLIC BLOOD PRESSURE: 100 MMHG | SYSTOLIC BLOOD PRESSURE: 170 MMHG

## 2022-12-28 DIAGNOSIS — E66.9 DIABETES MELLITUS TYPE 2 IN OBESE (HCC): Primary | ICD-10-CM

## 2022-12-28 DIAGNOSIS — E11.9 CONTROLLED TYPE 2 DIABETES MELLITUS WITHOUT COMPLICATION, WITHOUT LONG-TERM CURRENT USE OF INSULIN (HCC): ICD-10-CM

## 2022-12-28 DIAGNOSIS — I50.32 CHRONIC DIASTOLIC HEART FAILURE (HCC): ICD-10-CM

## 2022-12-28 DIAGNOSIS — E87.6 HYPOKALEMIA: ICD-10-CM

## 2022-12-28 DIAGNOSIS — E11.69 DIABETES MELLITUS TYPE 2 IN OBESE (HCC): Primary | ICD-10-CM

## 2022-12-28 DIAGNOSIS — I10 ESSENTIAL HYPERTENSION: ICD-10-CM

## 2022-12-28 LAB
A/G RATIO: 1.1 (ref 1.1–2.2)
ALBUMIN SERPL-MCNC: 4.1 G/DL (ref 3.4–5)
ALP BLD-CCNC: 154 U/L (ref 40–129)
ALT SERPL-CCNC: 18 U/L (ref 10–40)
ANION GAP SERPL CALCULATED.3IONS-SCNC: 14 MMOL/L (ref 3–16)
AST SERPL-CCNC: 16 U/L (ref 15–37)
BILIRUB SERPL-MCNC: 0.4 MG/DL (ref 0–1)
BUN BLDV-MCNC: 12 MG/DL (ref 7–20)
CALCIUM SERPL-MCNC: 9.5 MG/DL (ref 8.3–10.6)
CHLORIDE BLD-SCNC: 102 MMOL/L (ref 99–110)
CO2: 25 MMOL/L (ref 21–32)
CREAT SERPL-MCNC: 0.9 MG/DL (ref 0.6–1.1)
GFR SERPL CREATININE-BSD FRML MDRD: >60 ML/MIN/{1.73_M2}
GLUCOSE BLD-MCNC: 131 MG/DL (ref 70–99)
HBA1C MFR BLD: 5.6 %
POTASSIUM SERPL-SCNC: 3.3 MMOL/L (ref 3.5–5.1)
SODIUM BLD-SCNC: 141 MMOL/L (ref 136–145)
TOTAL PROTEIN: 7.7 G/DL (ref 6.4–8.2)

## 2022-12-28 PROCEDURE — 3017F COLORECTAL CA SCREEN DOC REV: CPT | Performed by: NURSE PRACTITIONER

## 2022-12-28 PROCEDURE — 2022F DILAT RTA XM EVC RTNOPTHY: CPT | Performed by: NURSE PRACTITIONER

## 2022-12-28 PROCEDURE — 1036F TOBACCO NON-USER: CPT | Performed by: NURSE PRACTITIONER

## 2022-12-28 PROCEDURE — G8417 CALC BMI ABV UP PARAM F/U: HCPCS | Performed by: NURSE PRACTITIONER

## 2022-12-28 PROCEDURE — 99214 OFFICE O/P EST MOD 30 MIN: CPT | Performed by: NURSE PRACTITIONER

## 2022-12-28 PROCEDURE — G8484 FLU IMMUNIZE NO ADMIN: HCPCS | Performed by: NURSE PRACTITIONER

## 2022-12-28 PROCEDURE — 3074F SYST BP LT 130 MM HG: CPT | Performed by: NURSE PRACTITIONER

## 2022-12-28 PROCEDURE — 3078F DIAST BP <80 MM HG: CPT | Performed by: NURSE PRACTITIONER

## 2022-12-28 PROCEDURE — G8427 DOCREV CUR MEDS BY ELIG CLIN: HCPCS | Performed by: NURSE PRACTITIONER

## 2022-12-28 PROCEDURE — 83036 HEMOGLOBIN GLYCOSYLATED A1C: CPT | Performed by: NURSE PRACTITIONER

## 2022-12-28 PROCEDURE — 3044F HG A1C LEVEL LT 7.0%: CPT | Performed by: NURSE PRACTITIONER

## 2022-12-28 ASSESSMENT — ENCOUNTER SYMPTOMS
COUGH: 0
VOMITING: 0
SHORTNESS OF BREATH: 0
DIARRHEA: 0
CHEST TIGHTNESS: 0
ABDOMINAL PAIN: 0
BLOOD IN STOOL: 0
NAUSEA: 0
CONSTIPATION: 0

## 2022-12-28 NOTE — PROGRESS NOTES
12/28/2022    This is a 65 y.o. female   Chief Complaint   Patient presents with    Hypertension    Diabetes   . HPI    Diabetes Mellitus Type 2: Current symptoms/problems include none. Home blood sugar records: patient does not test  Any episodes of hypoglycemia? no  Eye exam current (within one year): no  Tobacco history: She  reports that she quit smoking about 7 months ago. Her smoking use included cigarettes. She has a 64.50 pack-year smoking history. She has never used smokeless tobacco.     Not on medication therapy. Was dx when weight was 263 lbs. Walking daily for 30 minutes    Hypertension:  Home blood pressure monitoring: Yes - 118-129/73-81. She is not adherent to a low sodium diet. Patient denies chest pain, shortness of breath, headache, lightheadedness, peripheral edema, palpitations, and dry cough. Antihypertensive medication side effects: no medication side effects noted. She reports that her BP is always elevated in the office due to \"white coat syndrome\". She reports that her BP is normal at home. On lisinopril 5 mg daily and metoprolol succinate 25 mg daily     Hyperlipidemia:  No new myalgias or GI upset on atorvastatin (Lipitor). Lab Results   Component Value Date    LABA1C 5.3 08/15/2022    LABA1C 7.0 05/09/2022     Lab Results   Component Value Date    LABMICR <1.20 08/15/2022    CREATININE 0.8 10/25/2022     Lab Results   Component Value Date    ALT 25 08/16/2022    AST 28 08/16/2022     Lab Results   Component Value Date    CHOL 111 05/09/2022    TRIG 90 05/09/2022    HDL 26 (L) 05/09/2022    LDLCALC 67 05/09/2022        Right breast cancer- following with Dr. Conor Bell   S/p right mastectomy/snbx 7/7/22 for a 12.9 cm borderline phylloides tumor, negative node. S/p radiation completed on 11/17/22. Oncologist is [facility]    Chronic diastolic heart failure- following with Dr. Nery Esposito   Acute on chronic HF 5/2022.  Echo 5/8/22 showed EF 55-60%  Weight was 263 lbs on 5/8/22. Has decreased weight significantly. Quit smoking on 5/8/22. Was smoking 2 ppd for most of her life. Takes lasix 80 mg daily, lisinopril 5 mg daily and metoprolol succinate 25 mg daily   On potassium supplement at 40 meq twice daily and 60 meq daily. Has not been following low salt diet for the past couple of months. Has had increased family stress with illnesses. Weight has been running 189 lbs at home. Patient Active Problem List   Diagnosis    Malignancy ([facility] [address]    New onset of congestive heart failure (HCC)    Controlled type 2 diabetes mellitus without complication, without long-term current use of insulin (HCC)    Aortic atherosclerosis (HCC)    Nicotine dependence    Iron deficiency anemia    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, right    S/P right mastectomy    History of tobacco abuse- quit 5/2022          Current Outpatient Medications   Medication Sig Dispense Refill    potassium chloride (KLOR-CON M20) 20 MEQ extended release tablet TAKE 2 TABLETS BY MOUTH 2 TIMES DAILY AND 3 TABLETS BY MOUTH ONCE DAILY. 210 tablet 0    lisinopril (PRINIVIL;ZESTRIL) 5 MG tablet Take 1 tablet by mouth daily 30 tablet 0    atorvastatin (LIPITOR) 10 MG tablet Take 1 tablet by mouth daily 30 tablet 0    metoprolol succinate (TOPROL XL) 25 MG extended release tablet Take 1 tablet by mouth daily 30 tablet 0    furosemide (LASIX) 80 MG tablet TAKE 1 TABLET BY MOUTH EVERY DAY 90 tablet 3     No current facility-administered medications for this visit. No Known Allergies    Review of Systems   Constitutional:  Negative for activity change. Respiratory:  Negative for cough, chest tightness and shortness of breath. Cardiovascular:  Negative for chest pain. Gastrointestinal:  Negative for abdominal pain, blood in stool, constipation, diarrhea, nausea and vomiting. Neurological:  Negative for dizziness, syncope, weakness and headaches.    Psychiatric/Behavioral:  Negative for confusion. Vitals:    12/28/22 1010 12/28/22 1027   BP: (!) 164/100 (!) 170/100   Site: Left Upper Arm Right Upper Arm   Position: Sitting Sitting   Cuff Size: Medium Adult Medium Adult   Pulse: 90    Resp: 14    SpO2: 95%    Weight: 183 lb (83 kg)        Body mass index is 30.45 kg/m². Wt Readings from Last 3 Encounters:   12/28/22 183 lb (83 kg)   09/08/22 175 lb (79.4 kg)   08/16/22 173 lb 4.5 oz (78.6 kg)       BP Readings from Last 3 Encounters:   12/28/22 (!) 164/100   09/08/22 130/80   08/16/22 (!) 173/97       Physical Exam  Vitals and nursing note reviewed. Constitutional:       General: She is not in acute distress. Appearance: She is well-developed. HENT:      Head: Normocephalic and atraumatic. Cardiovascular:      Rate and Rhythm: Normal rate and regular rhythm. Heart sounds: Normal heart sounds. No murmur heard. No friction rub. No gallop. Pulmonary:      Effort: Pulmonary effort is normal. No respiratory distress. Breath sounds: Normal breath sounds. Musculoskeletal:      Cervical back: Neck supple. Right lower leg: No edema. Left lower leg: No edema. Skin:     General: Skin is warm and dry. Neurological:      Mental Status: She is alert and oriented to person, place, and time. Psychiatric:         Behavior: Behavior normal.         Thought Content: Thought content normal.         Judgment: Judgment normal.       Assessmentand Plan  Marcos Cervantes was seen today for hypertension and diabetes. Diagnoses and all orders for this visit:    Diabetes mellitus type 2 in obese ([address]  -     POCT glycosylated hemoglobin (Hb A1C)- 5.6%  -     Comprehensive Metabolic Panel; Future  Diet controlled. Continue to work on low carb diet, aerobic exercise, and weight loss. Chronic diastolic heart failure (HCC)  -     Comprehensive Metabolic Panel; Future  Appears euvolemic on exam today.    On lisinopril 5 mg daily and metoprolol succinate 25 mg daily   On lasix 80 mg daily. This causes hypokalemia and therefore taking potassium chloride 40 meq in morning and afternoon and 60 meq in the evening. Has f/u with cardiologist Dr. Yuval Merrill later this week and will discuss treatment therapy     Hypokalemia  -     Comprehensive Metabolic Panel; Future  On supplement     Essential hypertension  Uncontrolled. She is reporting normal readings at home. She is not wanting to adjust medication today. She has f/u with her cardiologist Dr. Yuval Merrill later this week and will discuss BP further at that time. Advised to obtain vaccines at her pharmacy. Advised to schedule screening colonoscopy that has been recommended in the past.     Return in about 6 months (around 6/28/2023), or if symptoms worsen or fail to improve, for chronic conditions.

## 2022-12-28 NOTE — PROGRESS NOTES
[address]      Cardiology Consult    Alysiatammy Keny  1965 December 30, 2022    Referring Physician: Rachel Christianson, APRIL - CNP  Reason for Referral: CHF    CC: \"I am gaining weight. \"     HPI:  The patient is 65 y.o. female with a past medical history significant for diastolic heart failure, breast cancer, and hypertension who presents today for management of heart failure. She presented to the hospital with complaints of worsening lower extremity edema and shortness of breath. She did not routinely follow with a physician. She noted lower extremity edema for the past several years but did not seek medical attention. The swelling would wax and wane in intensity but would become so severe at times that she could not bend her knees or move her ankles. She would have skin breakdown at times on her lower extremities. However over a few weeks, she had been having worsening shortness of breath and PND/orthopnea. [facility] was also consulted as she had a large fungating right breast mass. She first noticed 2 \"marble sized\" masses in her right breast approximately 10 years ago. For over 1 year, the mass on her chest would open up periodically and bleed but she never sought medical attention. She was started on IV Lasix and diuresed approximately 60 pounds prior to discharge and has lost more than 80 pounds total. Pulmonary was also consulted as the patient had hypercapnic respiratory failure requiring BiPAP. She was discharged home with supplemental oxygen. Subsequently, she is undergone surgery for the breast mass and completed radiation treatments. Today, she states that overall she is doing well. She is accompanied by her  and son. She endorses significant anxiety at physician appointments. Her blood pressure is elevated in the office but she provides a blood pressure log at home which has been very well controlled and averages <120/80.  She has gained about 6 pounds over the last month secondary to dietary indiscretion. She does not feel that this is pure fluid retention and denies lower extremity edema or worsening shortness of breath. She continues to have low potassium despite the potassium supplement. Patient denies exertional chest pain/pressure, dyspnea at rest, WALTERS, PND, orthopnea, palpitations, lightheadedness, changes in LE edema, and syncope. Patient reports compliance to her medications. Past Medical History:   Diagnosis Date    Breast cancer ([facility] [address]     CHF (congestive heart failure) ([facility] [address]     Hyperlipidemia     Hypertension      Past Surgical History:   Procedure Laterality Date    CYST INCISION AND DRAINAGE      MASTECTOMY Right 2022    RIGHT MASTECTOMY, RIGHT AXILLARY SENTINEL NODE BIOPSY WITH RADIOISOTOPE performed by Oswaldo Kaye MD at [facility]       History reviewed. No pertinent family history. Social History     Tobacco Use    Smoking status: Former     Packs/day: 1.50     Years: 43.00     Pack years: 64.50     Types: Cigarettes     Quit date: 2022     Years since quittin.6    Smokeless tobacco: Never   Vaping Use    Vaping Use: Never used   Substance Use Topics    Alcohol use: Yes     Alcohol/week: 1.0 - 3.0 standard drink     Types: 1 - 3 Cans of beer per week     Comment: 22 last drink    Drug use: Never     No Known Allergies  Current Outpatient Medications   Medication Sig Dispense Refill    potassium chloride (KLOR-CON M20) 20 MEQ extended release tablet Take 3 tablets by mouth 2 times daily AND 2 tablets daily.  210 tablet 0    lisinopril (PRINIVIL;ZESTRIL) 5 MG tablet Take 1 tablet by mouth daily 30 tablet 0    atorvastatin (LIPITOR) 10 MG tablet Take 1 tablet by mouth daily 30 tablet 0    metoprolol succinate (TOPROL XL) 25 MG extended release tablet Take 1 tablet by mouth daily 30 tablet 0    furosemide (LASIX) 80 MG tablet TAKE 1 TABLET BY MOUTH EVERY DAY 90 tablet 3     No current facility-administered medications for this visit. Review of Systems:  Constitutional: No unanticipated weight loss. There's been no change in energy level, sleep pattern, or activity level. No fevers, chills. Eyes: No visual changes or diplopia. No scleral icterus. ENT: No Headaches, hearing loss or vertigo. No mouth sores or sore throat. Cardiovascular: as reviewed in HPI  Respiratory: No cough or wheezing, no sputum production. No hemoptysis. Gastrointestinal: No abdominal pain, appetite loss, blood in stools. No change in bowel or bladder habits. Genitourinary: No dysuria, trouble voiding, or hematuria. Musculoskeletal:  No gait disturbance, no joint complaints. Integumentary: No rash or pruritis. Neurological: No headache, diplopia, change in muscle strength, numbness or tingling. Psychiatric: No anxiety or depression. Endocrine: No temperature intolerance. No excessive thirst, fluid intake, or urination. No tremor. Hematologic/Lymphatic: No abnormal bruising or bleeding, blood clots or swollen lymph nodes. Allergic/Immunologic: No nasal congestion or hives. Physical Exam:   BP (!) 160/102 (Site: Left Upper Arm, Position: Sitting)   Pulse 95   Ht 5' 5\" (1.651 m)   Wt 189 lb (85.7 kg)   LMP 06/30/2014   SpO2 99%   BMI 31.45 kg/m²   Wt Readings from Last 3 Encounters:   12/30/22 189 lb (85.7 kg)   12/28/22 183 lb (83 kg)   09/08/22 175 lb (79.4 kg)     Constitutional: She is oriented to person, place, and time. She appears well-developed and well-nourished. In no acute distress. Head: Normocephalic and atraumatic. Pupils equal and round. Neck: Neck supple. No JVP or carotid bruit appreciated. No mass and no thyromegaly present. No lymphadenopathy present. Cardiovascular: Normal rate. Normal heart sounds. Exam reveals no gallop and no friction rub. No murmur heard. Pulmonary/Chest: Effort normal and breath sounds normal. No respiratory distress.  She has no wheezes, rhonchi or rales. Fungating right breast mass. Abdominal: Soft, non-tender. Bowel sounds are normal. She exhibits no organomegaly, mass or bruit. Extremities: Trace ankle edema. No cyanosis or clubbing. Pulses are 2+ radial/carotid bilaterally. Neurological: No gross cranial nerve deficit. Coordination normal.   Skin: Skin is warm and dry. There is no rash or diaphoresis. Psychiatric: She has a normal mood and affect. Her speech is normal and behavior is normal.     Lab Review:   FLP:    Lab Results   Component Value Date/Time    TRIG 90 05/09/2022 06:09 AM    HDL 26 05/09/2022 06:09 AM    LDLCALC 67 05/09/2022 06:09 AM    LABVLDL 18 05/09/2022 06:09 AM     BUN/Creatinine:    Lab Results   Component Value Date/Time    BUN 12 12/28/2022 10:43 AM    CREATININE 0.9 12/28/2022 10:43 AM     EKG Interpretation: 6/24/22 Sinus rhythm right axis deviation, incomplete RBBB    Image Review:     Echo 5/11/22   Left ventricular cavity size is normal. Normal left ventricular wall thickness. Ejection fraction is visually estimated to be 55-60%. No regional wall motion abnormalities are noted. Indeterminate diastolic function. Measurement attempted while definity was administered. Right ventricular appears mildly enlarged with possible mildly reduced Rv function  Unable to obtain doppler, M-Mode and RV TDI  Right ventricular septum flattened in systole and diastole consistent with RV pressure and volume overload. Mild pulmonic regurgitation present. Assessment/Plan:  1) Chronic diastolic heart failure. EF 55-60%. NYHA II. Pt appears euvolemic today. Current weight 189 pounds. Continue with medical therapy including ACE-I, B-blocker, and statin. Encouraged a low sodium diet and daily weight. Continue lasix 80 mg daily. Repeat echocardiogram.  Consider addition of SGLT2i and/or Aldactone pending results of echocardiogram.    2) History of chronic hypoxic and hypercapnic respiratory failure. Improved with weight loss.  Pulmonary no longer following. 3) Breast cancer. Management per [facility] and surgery. 4) CAD risk equivalent with type 2 diabetes mellitus/aortic atherosclerosis. Diabetes management per primary care physician; encouraged follow up. Continue statin therapy. 5) Nicotine Addiction. Encouraged continued smoking cessation. 6) Iron deficiency anemia. Received IV iron in the hospital but defer additional work-up/treatment to primary care physician and [facility]. Ferritin improved 370.7 in 8/2022. 7) Elevated blood pressure reading. Patient reports significant anxiety at doctor office visits. Home BP log with normal blood pressures. Follow up in 6 months     Thank you very much for allowing me to participate in the care of your patient. Please do not hesitate to contact me if you have any questions. Sincerely,  Saranya Baum. Aurelia Guidry MD      [facility], [address] [place], [place] [address], [address]  Ph: [phone]  Fax: [phone]    This note was scribed in the presence of Dr Aurelia Guidry, by Jigar Davis RN  Physician Attestation: The scribes documentation has been prepared under my direction and personally reviewed by me in its entirety. I confirm that the note above accurately reflects all work, treatment, procedures, and medical decision making performed by me. All portions of the note including but not limited to the chief complaint, history of present illness, physical exam, assessment and plan/medical decision making were personally reviewed, edited, and updated on the day of the visit.

## 2022-12-29 DIAGNOSIS — E87.6 HYPOKALEMIA: ICD-10-CM

## 2022-12-29 RX ORDER — POTASSIUM CHLORIDE 20 MEQ/1
TABLET, EXTENDED RELEASE ORAL
Qty: 210 TABLET | Refills: 0
Start: 2022-12-29

## 2022-12-30 ENCOUNTER — OFFICE VISIT (OUTPATIENT)
Dept: CARDIOLOGY CLINIC | Age: 57
End: 2022-12-30
Payer: MEDICARE

## 2022-12-30 VITALS
BODY MASS INDEX: 31.49 KG/M2 | HEART RATE: 95 BPM | SYSTOLIC BLOOD PRESSURE: 160 MMHG | DIASTOLIC BLOOD PRESSURE: 102 MMHG | OXYGEN SATURATION: 99 % | HEIGHT: 65 IN | WEIGHT: 189 LBS

## 2022-12-30 DIAGNOSIS — I70.0 AORTIC ATHEROSCLEROSIS (HCC): ICD-10-CM

## 2022-12-30 DIAGNOSIS — R03.0 ELEVATED BLOOD PRESSURE READING: ICD-10-CM

## 2022-12-30 DIAGNOSIS — I50.32 CHRONIC DIASTOLIC HEART FAILURE (HCC): Primary | ICD-10-CM

## 2022-12-30 PROCEDURE — 3017F COLORECTAL CA SCREEN DOC REV: CPT | Performed by: INTERNAL MEDICINE

## 2022-12-30 PROCEDURE — G8484 FLU IMMUNIZE NO ADMIN: HCPCS | Performed by: INTERNAL MEDICINE

## 2022-12-30 PROCEDURE — 3080F DIAST BP >= 90 MM HG: CPT | Performed by: INTERNAL MEDICINE

## 2022-12-30 PROCEDURE — G8417 CALC BMI ABV UP PARAM F/U: HCPCS | Performed by: INTERNAL MEDICINE

## 2022-12-30 PROCEDURE — 1036F TOBACCO NON-USER: CPT | Performed by: INTERNAL MEDICINE

## 2022-12-30 PROCEDURE — 3077F SYST BP >= 140 MM HG: CPT | Performed by: INTERNAL MEDICINE

## 2022-12-30 PROCEDURE — 99214 OFFICE O/P EST MOD 30 MIN: CPT | Performed by: INTERNAL MEDICINE

## 2022-12-30 PROCEDURE — G8427 DOCREV CUR MEDS BY ELIG CLIN: HCPCS | Performed by: INTERNAL MEDICINE

## 2023-01-03 DIAGNOSIS — E87.6 HYPOKALEMIA: ICD-10-CM

## 2023-01-03 LAB
ANION GAP SERPL CALCULATED.3IONS-SCNC: 12 MMOL/L (ref 3–16)
BUN BLDV-MCNC: 13 MG/DL (ref 7–20)
CALCIUM SERPL-MCNC: 9.2 MG/DL (ref 8.3–10.6)
CHLORIDE BLD-SCNC: 105 MMOL/L (ref 99–110)
CO2: 25 MMOL/L (ref 21–32)
CREAT SERPL-MCNC: 0.8 MG/DL (ref 0.6–1.1)
GFR SERPL CREATININE-BSD FRML MDRD: >60 ML/MIN/{1.73_M2}
GLUCOSE BLD-MCNC: 172 MG/DL (ref 70–99)
POTASSIUM SERPL-SCNC: 3.9 MMOL/L (ref 3.5–5.1)
SODIUM BLD-SCNC: 142 MMOL/L (ref 136–145)

## 2023-01-04 DIAGNOSIS — E87.6 HYPOKALEMIA: Primary | ICD-10-CM

## 2023-01-06 RX ORDER — METOPROLOL SUCCINATE 25 MG/1
TABLET, EXTENDED RELEASE ORAL
Qty: 30 TABLET | Refills: 2 | Status: SHIPPED | OUTPATIENT
Start: 2023-01-06

## 2023-01-06 RX ORDER — ATORVASTATIN CALCIUM 10 MG/1
TABLET, FILM COATED ORAL
Qty: 30 TABLET | Refills: 2 | Status: SHIPPED | OUTPATIENT
Start: 2023-01-06

## 2023-01-06 RX ORDER — LISINOPRIL 5 MG/1
TABLET ORAL
Qty: 30 TABLET | Refills: 2 | Status: SHIPPED | OUTPATIENT
Start: 2023-01-06

## 2023-01-09 DIAGNOSIS — E87.6 HYPOKALEMIA: ICD-10-CM

## 2023-01-09 RX ORDER — POTASSIUM CHLORIDE 20 MEQ/1
TABLET, EXTENDED RELEASE ORAL
Qty: 210 TABLET | Refills: 0 | Status: CANCELLED | OUTPATIENT
Start: 2023-01-09

## 2023-01-10 RX ORDER — POTASSIUM CHLORIDE 20 MEQ/1
TABLET, EXTENDED RELEASE ORAL
Qty: 240 TABLET | Refills: 0 | Status: SHIPPED | OUTPATIENT
Start: 2023-01-10 | End: 2023-02-07

## 2023-01-18 DIAGNOSIS — E87.6 HYPOKALEMIA: ICD-10-CM

## 2023-01-18 LAB
ANION GAP SERPL CALCULATED.3IONS-SCNC: 13 MMOL/L (ref 3–16)
BUN BLDV-MCNC: 12 MG/DL (ref 7–20)
CALCIUM SERPL-MCNC: 9.6 MG/DL (ref 8.3–10.6)
CHLORIDE BLD-SCNC: 102 MMOL/L (ref 99–110)
CO2: 28 MMOL/L (ref 21–32)
CREAT SERPL-MCNC: 0.8 MG/DL (ref 0.6–1.1)
GFR SERPL CREATININE-BSD FRML MDRD: >60 ML/MIN/{1.73_M2}
GLUCOSE BLD-MCNC: 165 MG/DL (ref 70–99)
POTASSIUM SERPL-SCNC: 3.7 MMOL/L (ref 3.5–5.1)
SODIUM BLD-SCNC: 143 MMOL/L (ref 136–145)

## 2023-02-07 DIAGNOSIS — E87.6 HYPOKALEMIA: ICD-10-CM

## 2023-02-07 RX ORDER — POTASSIUM CHLORIDE 1500 MG/1
TABLET, EXTENDED RELEASE ORAL
Qty: 240 TABLET | Refills: 0 | Status: SHIPPED | OUTPATIENT
Start: 2023-02-07 | End: 2023-03-07

## 2023-02-07 NOTE — TELEPHONE ENCOUNTER
Medication refilled.   I see that she no showed her appointment for her echocardiogram.  She needs to have this rescheduled and then follow-up with her cardiologist.

## 2023-02-17 RX ORDER — FUROSEMIDE 80 MG
TABLET ORAL
Qty: 90 TABLET | Refills: 3 | Status: CANCELLED | OUTPATIENT
Start: 2023-02-17

## 2023-02-17 RX ORDER — FUROSEMIDE 80 MG
80 TABLET ORAL DAILY
Qty: 90 TABLET | Refills: 3 | Status: SHIPPED | OUTPATIENT
Start: 2023-02-17 | End: 2023-03-09 | Stop reason: SDUPTHER

## 2023-02-28 ENCOUNTER — HOSPITAL ENCOUNTER (OUTPATIENT)
Dept: CT IMAGING | Age: 58
Discharge: HOME | End: 2023-02-28
Payer: MEDICAID

## 2023-02-28 DIAGNOSIS — C50.011 MALIGNANT NEOPLASM OF NIPPLE AND AREOLA OF FEMALE BREAST, RIGHT (HCC): ICD-10-CM

## 2023-02-28 PROCEDURE — 74177 CT ABD & PELVIS W/CONTRAST: CPT

## 2023-02-28 PROCEDURE — 6360000004 HC RX CONTRAST MEDICATION: Performed by: INTERNAL MEDICINE

## 2023-02-28 RX ADMIN — IOPAMIDOL 75 ML: 755 INJECTION, SOLUTION INTRAVENOUS at 10:12

## 2023-02-28 RX ADMIN — IOPAMIDOL 50 ML: 612 INJECTION, SOLUTION INTRAVENOUS at 10:09

## 2023-03-03 ENCOUNTER — TELEPHONE (OUTPATIENT)
Dept: BREAST CENTER | Age: 58
End: 2023-03-03

## 2023-03-03 NOTE — TELEPHONE ENCOUNTER
Called patient to cancel/ reschedule appointment for 03/22/23  Appointment scheduled through my-chart- scheduled incorrectly-according to schedule.    Patient returns call please re-schedule for next available.

## 2023-03-07 DIAGNOSIS — E87.6 HYPOKALEMIA: ICD-10-CM

## 2023-03-07 RX ORDER — POTASSIUM CHLORIDE 1500 MG/1
TABLET, EXTENDED RELEASE ORAL
Qty: 240 TABLET | Refills: 0 | Status: SHIPPED | OUTPATIENT
Start: 2023-03-07

## 2023-03-21 RX ORDER — FUROSEMIDE 80 MG
80 TABLET ORAL DAILY
Qty: 90 TABLET | Refills: 1 | Status: SHIPPED | OUTPATIENT
Start: 2023-03-21

## 2023-04-06 RX ORDER — METOPROLOL SUCCINATE 25 MG/1
TABLET, EXTENDED RELEASE ORAL
Qty: 30 TABLET | Refills: 2 | Status: SHIPPED | OUTPATIENT
Start: 2023-04-06

## 2023-04-06 RX ORDER — ATORVASTATIN CALCIUM 10 MG/1
TABLET, FILM COATED ORAL
Qty: 30 TABLET | Refills: 2 | Status: SHIPPED | OUTPATIENT
Start: 2023-04-06

## 2023-04-06 RX ORDER — LISINOPRIL 5 MG/1
TABLET ORAL
Qty: 30 TABLET | Refills: 2 | Status: SHIPPED | OUTPATIENT
Start: 2023-04-06

## 2023-05-08 DIAGNOSIS — E87.6 HYPOKALEMIA: ICD-10-CM

## 2023-05-12 DIAGNOSIS — E87.6 HYPOKALEMIA: ICD-10-CM

## 2023-05-12 LAB
ANION GAP SERPL CALCULATED.3IONS-SCNC: 12 MMOL/L (ref 3–16)
BUN SERPL-MCNC: 16 MG/DL (ref 7–20)
CALCIUM SERPL-MCNC: 9.4 MG/DL (ref 8.3–10.6)
CHLORIDE SERPL-SCNC: 109 MMOL/L (ref 99–110)
CO2 SERPL-SCNC: 23 MMOL/L (ref 21–32)
CREAT SERPL-MCNC: 1.2 MG/DL (ref 0.6–1.1)
GFR SERPLBLD CREATININE-BSD FMLA CKD-EPI: 53 ML/MIN/{1.73_M2}
GLUCOSE SERPL-MCNC: 134 MG/DL (ref 70–99)
POTASSIUM SERPL-SCNC: 4.4 MMOL/L (ref 3.5–5.1)
SODIUM SERPL-SCNC: 144 MMOL/L (ref 136–145)

## 2023-05-15 RX ORDER — POTASSIUM CHLORIDE 20 MEQ/1
TABLET, EXTENDED RELEASE ORAL
Qty: 240 TABLET | Refills: 0 | OUTPATIENT
Start: 2023-05-15

## 2023-05-15 RX ORDER — POTASSIUM CHLORIDE 1500 MG/1
TABLET, EXTENDED RELEASE ORAL
Qty: 240 TABLET | Refills: 0 | Status: SHIPPED | OUTPATIENT
Start: 2023-05-15

## 2023-05-22 ENCOUNTER — HOSPITAL ENCOUNTER (OUTPATIENT)
Dept: CT IMAGING | Age: 58
Discharge: HOME | End: 2023-05-22
Payer: MEDICAID

## 2023-05-22 DIAGNOSIS — C50.011 PAGET'S DISEASE OF THE BREAST, RIGHT (HCC): ICD-10-CM

## 2023-05-22 PROCEDURE — 71260 CT THORAX DX C+: CPT

## 2023-05-22 PROCEDURE — 6360000004 HC RX CONTRAST MEDICATION: Performed by: INTERNAL MEDICINE

## 2023-05-22 RX ADMIN — IOPAMIDOL 50 ML: 612 INJECTION, SOLUTION INTRAVENOUS at 09:06

## 2023-05-22 RX ADMIN — IOPAMIDOL 75 ML: 755 INJECTION, SOLUTION INTRAVENOUS at 09:06

## 2023-06-08 DIAGNOSIS — E87.6 HYPOKALEMIA: ICD-10-CM

## 2023-06-08 RX ORDER — POTASSIUM CHLORIDE 1500 MG/1
TABLET, EXTENDED RELEASE ORAL
Qty: 240 TABLET | Refills: 0 | Status: SHIPPED | OUTPATIENT
Start: 2023-06-08

## 2023-06-08 NOTE — TELEPHONE ENCOUNTER
Mediation refilled. Patient still has not completed echo that has previously been ordered. She needs to have this completed. I will refer to nephrologist for evaluation due to her hypokalemia and need to use high doses of potassium replacement.

## 2023-06-13 NOTE — TELEPHONE ENCOUNTER
LVM for patient to call office and to complete the echo. Also that she would possibly need to see nephrology because of high doses of potasium.

## 2023-07-09 DIAGNOSIS — E87.6 HYPOKALEMIA: ICD-10-CM

## 2023-07-17 ENCOUNTER — TELEPHONE (OUTPATIENT)
Dept: FAMILY MEDICINE CLINIC | Age: 58
End: 2023-07-17

## 2023-07-17 RX ORDER — ATORVASTATIN CALCIUM 10 MG/1
10 TABLET, FILM COATED ORAL DAILY
Qty: 30 TABLET | Refills: 0 | Status: SHIPPED | OUTPATIENT
Start: 2023-07-17

## 2023-07-17 RX ORDER — METOPROLOL SUCCINATE 25 MG/1
25 TABLET, EXTENDED RELEASE ORAL DAILY
Qty: 30 TABLET | Refills: 0 | Status: SHIPPED | OUTPATIENT
Start: 2023-07-17

## 2023-07-17 RX ORDER — LISINOPRIL 5 MG/1
5 TABLET ORAL DAILY
Qty: 30 TABLET | Refills: 0 | Status: SHIPPED | OUTPATIENT
Start: 2023-07-17

## 2023-07-17 NOTE — TELEPHONE ENCOUNTER
LVM for patient that atorvastatin, lisinopril and metoprolol had been sent to the pharmacy. Also that the potassium would be sent once labs are completed. Also that her insurance may not be out-of-network and to check with her insurance to make sure her visits are still covered.

## 2023-07-17 NOTE — TELEPHONE ENCOUNTER
Pt calling in, she is out of atorvastatin, lisinopril, and metoprolol. Advised Office was trying to contact her to schedule an appt. Stated that she schedule in August to see alexey and can not schedule sooner.     Please Advise  Pt can be reached at [phone]

## 2023-07-17 NOTE — TELEPHONE ENCOUNTER
BMP ordered to be completed today. Need to check potassium level again. I see that she has been contacted to make a sooner appt. Looks like she has [facility], I believe that we are now out of network. She needs to check with her insurance and schedule a new PCP appt for ongoing care.

## 2023-07-17 NOTE — TELEPHONE ENCOUNTER
Medications refilled. See other message about having BMP drawn to refill her potassium. Looks like she has [facility], I believe that we are now out of network. She needs to check with her insurance and schedule a new PCP appt for ongoing care.

## 2023-07-19 NOTE — TELEPHONE ENCOUNTER
LVM for patient to get labs drawn and to check with insurance to verify coverage. Also to call back.

## 2023-07-21 RX ORDER — LISINOPRIL 5 MG/1
TABLET ORAL
Qty: 30 TABLET | Refills: 2 | OUTPATIENT
Start: 2023-07-21

## 2023-07-21 RX ORDER — ATORVASTATIN CALCIUM 10 MG/1
TABLET, FILM COATED ORAL
Qty: 30 TABLET | Refills: 2 | OUTPATIENT
Start: 2023-07-21

## 2023-07-21 RX ORDER — METOPROLOL SUCCINATE 25 MG/1
TABLET, EXTENDED RELEASE ORAL
Qty: 30 TABLET | Refills: 2 | OUTPATIENT
Start: 2023-07-21

## 2023-07-31 NOTE — TELEPHONE ENCOUNTER
LVM for patient to call office and verify insurance coverage. Also to get ordered labs completed so rx for potassium could be sent to pharmacy.

## 2023-08-04 DIAGNOSIS — E87.6 HYPOKALEMIA: ICD-10-CM

## 2023-08-04 RX ORDER — POTASSIUM CHLORIDE 1500 MG/1
TABLET, EXTENDED RELEASE ORAL
Qty: 240 TABLET | Refills: 0 | OUTPATIENT
Start: 2023-08-04

## 2023-08-04 NOTE — TELEPHONE ENCOUNTER
Spoke to patient and explained that rx could not be filled until labs are completed. Also discussed her insurance being out of network. She is going to take the lab order to a lab outside [facility] so potassium can be filled. She is also going to check on switching insurance plans to a plan that is in network. She is not currently taking her diuretic because she is out of potassium. Advised her to not take the high dose of potassium while off the water pill because of risk for heart attack. Also that she should be taking all her meds as prescribed and to swithch plans or she would need to find a new pcp.

## 2023-08-07 RX ORDER — LISINOPRIL 5 MG/1
5 TABLET ORAL DAILY
Qty: 30 TABLET | Refills: 0 | Status: SHIPPED | OUTPATIENT
Start: 2023-08-07 | End: 2023-09-22 | Stop reason: SDUPTHER

## 2023-08-07 NOTE — TELEPHONE ENCOUNTER
Due for follow up with their PCP. Medicine has been refilled, but please call them to make an appointment. Thanks.

## 2023-08-14 RX ORDER — ATORVASTATIN CALCIUM 10 MG/1
TABLET, FILM COATED ORAL
Qty: 30 TABLET | Refills: 0 | Status: SHIPPED | OUTPATIENT
Start: 2023-08-14

## 2023-08-14 RX ORDER — METOPROLOL SUCCINATE 25 MG/1
TABLET, EXTENDED RELEASE ORAL
Qty: 30 TABLET | Refills: 0 | Status: SHIPPED | OUTPATIENT
Start: 2023-08-14

## 2023-08-21 RX ORDER — POTASSIUM CHLORIDE 20 MEQ/1
TABLET, EXTENDED RELEASE ORAL
Qty: 240 TABLET | Refills: 0 | OUTPATIENT
Start: 2023-08-21

## 2023-09-13 RX ORDER — ATORVASTATIN CALCIUM 10 MG/1
TABLET, FILM COATED ORAL
Qty: 30 TABLET | Refills: 0 | OUTPATIENT
Start: 2023-09-13

## 2023-09-13 RX ORDER — LISINOPRIL 5 MG/1
5 TABLET ORAL DAILY
Qty: 30 TABLET | Refills: 0 | OUTPATIENT
Start: 2023-09-13

## 2023-09-13 RX ORDER — METOPROLOL SUCCINATE 25 MG/1
TABLET, EXTENDED RELEASE ORAL
Qty: 30 TABLET | Refills: 0 | OUTPATIENT
Start: 2023-09-13

## 2023-09-13 NOTE — TELEPHONE ENCOUNTER
Patient is overdue for f/u and labs. Needs appt before medications are refilled.  Also needs appt with cardiologist.

## 2023-09-18 RX ORDER — METOPROLOL SUCCINATE 25 MG/1
25 TABLET, EXTENDED RELEASE ORAL DAILY
Qty: 30 TABLET | Refills: 0 | Status: CANCELLED | OUTPATIENT
Start: 2023-09-18

## 2023-09-18 RX ORDER — ATORVASTATIN CALCIUM 10 MG/1
10 TABLET, FILM COATED ORAL DAILY
Qty: 30 TABLET | Refills: 0 | Status: CANCELLED | OUTPATIENT
Start: 2023-09-18

## 2023-09-18 NOTE — TELEPHONE ENCOUNTER
LVM for patient to call and schedule sooner than 10/10 and needs labs completed before meds can be refilled.

## 2023-09-19 RX ORDER — LISINOPRIL 5 MG/1
5 TABLET ORAL DAILY
Qty: 30 TABLET | Refills: 0 | OUTPATIENT
Start: 2023-09-19

## 2023-09-19 NOTE — TELEPHONE ENCOUNTER
Patient is overdue for f/u and labs. Needs appt before medications are refilled.  Also needs appt with cardiologist. F05

## 2023-09-20 DIAGNOSIS — E87.6 HYPOKALEMIA: ICD-10-CM

## 2023-09-21 DIAGNOSIS — E87.6 HYPOKALEMIA: ICD-10-CM

## 2023-09-22 ENCOUNTER — TELEMEDICINE (OUTPATIENT)
Dept: FAMILY MEDICINE CLINIC | Age: 58
End: 2023-09-22
Payer: COMMERCIAL

## 2023-09-22 DIAGNOSIS — I50.32 CHRONIC DIASTOLIC HEART FAILURE (HCC): Primary | ICD-10-CM

## 2023-09-22 DIAGNOSIS — E87.6 HYPOKALEMIA: ICD-10-CM

## 2023-09-22 DIAGNOSIS — I10 ESSENTIAL HYPERTENSION: ICD-10-CM

## 2023-09-22 LAB
ANION GAP SERPL CALCULATED.3IONS-SCNC: 14 MMOL/L (ref 3–16)
BUN SERPL-MCNC: 15 MG/DL (ref 7–20)
CALCIUM SERPL-MCNC: 9.1 MG/DL (ref 8.3–10.6)
CHLORIDE SERPL-SCNC: 102 MMOL/L (ref 99–110)
CO2 SERPL-SCNC: 27 MMOL/L (ref 21–32)
CREAT SERPL-MCNC: 1 MG/DL (ref 0.6–1.1)
GFR SERPLBLD CREATININE-BSD FMLA CKD-EPI: >60 ML/MIN/{1.73_M2}
GLUCOSE SERPL-MCNC: 100 MG/DL (ref 70–99)
POTASSIUM SERPL-SCNC: 3.1 MMOL/L (ref 3.5–5.1)
SODIUM SERPL-SCNC: 143 MMOL/L (ref 136–145)

## 2023-09-22 PROCEDURE — G8427 DOCREV CUR MEDS BY ELIG CLIN: HCPCS | Performed by: NURSE PRACTITIONER

## 2023-09-22 PROCEDURE — 99214 OFFICE O/P EST MOD 30 MIN: CPT | Performed by: NURSE PRACTITIONER

## 2023-09-22 PROCEDURE — 3017F COLORECTAL CA SCREEN DOC REV: CPT | Performed by: NURSE PRACTITIONER

## 2023-09-22 RX ORDER — POTASSIUM CHLORIDE 20 MEQ/1
20 TABLET, EXTENDED RELEASE ORAL 2 TIMES DAILY
Qty: 60 TABLET | Refills: 0 | Status: SHIPPED | OUTPATIENT
Start: 2023-09-22

## 2023-09-22 RX ORDER — POTASSIUM CHLORIDE 1500 MG/1
TABLET, EXTENDED RELEASE ORAL
Qty: 240 TABLET | Refills: 0 | OUTPATIENT
Start: 2023-09-22

## 2023-09-22 RX ORDER — FUROSEMIDE 40 MG/1
40 TABLET ORAL DAILY
Qty: 30 TABLET | Refills: 0 | Status: SHIPPED | OUTPATIENT
Start: 2023-09-22

## 2023-09-22 RX ORDER — ATORVASTATIN CALCIUM 10 MG/1
10 TABLET, FILM COATED ORAL DAILY
Qty: 30 TABLET | Refills: 0 | Status: SHIPPED | OUTPATIENT
Start: 2023-09-22

## 2023-09-22 RX ORDER — LISINOPRIL 5 MG/1
5 TABLET ORAL DAILY
Qty: 30 TABLET | Refills: 0 | Status: SHIPPED | OUTPATIENT
Start: 2023-09-22

## 2023-09-22 RX ORDER — METOPROLOL SUCCINATE 25 MG/1
25 TABLET, EXTENDED RELEASE ORAL DAILY
Qty: 30 TABLET | Refills: 0 | Status: SHIPPED | OUTPATIENT
Start: 2023-09-22

## 2023-09-22 ASSESSMENT — PATIENT HEALTH QUESTIONNAIRE - PHQ9
SUM OF ALL RESPONSES TO PHQ9 QUESTIONS 1 & 2: 0
1. LITTLE INTEREST OR PLEASURE IN DOING THINGS: 0
SUM OF ALL RESPONSES TO PHQ QUESTIONS 1-9: 0
SUM OF ALL RESPONSES TO PHQ QUESTIONS 1-9: 0
2. FEELING DOWN, DEPRESSED OR HOPELESS: 0
SUM OF ALL RESPONSES TO PHQ QUESTIONS 1-9: 0
SUM OF ALL RESPONSES TO PHQ QUESTIONS 1-9: 0

## 2023-09-22 ASSESSMENT — ENCOUNTER SYMPTOMS
WHEEZING: 0
ABDOMINAL PAIN: 0
SHORTNESS OF BREATH: 0
COUGH: 0

## 2023-09-22 NOTE — PROGRESS NOTES
2023    TELEHEALTH EVALUATION -- Audio/Visual    HPI:    Jayme Sutton (:  1965) has requested an audio/video evaluation for the following concern(s):  Chief Complaint   Patient presents with    Results     F/u for labs and for meds. Patient was added on as an urgent video visit today since she has been out of her medication. She was not able to come into the office today. Has appt scheduled for 10/10/23. She reports that she has not taken her furosemide 80 mg daily or her potassium supplement for the past month. Denies cough, shortness of breath, edema, weight gain. Reports that weight at home has been running 190 lbs. Chronic diastolic heart failure- following with Dr. Rosanna Holguin- has not seen recently. Acute on chronic HF 2022. Echo 22 showed EF 55-60%. Has order to repeat echo, but she has not yet scheduled. Weight was 263 lbs on 22. Has decreased weight significantly. Quit smoking on 22. Was smoking 2 ppd for most of her life. On lisinopril 5 mg daily and metoprolol succinate 25 mg daily. Has been out of medication for the past 3 days. She reports that she does not always follow a low salt diet. Has a new job as a cook at [employer]   Constitutional:  Negative for activity change, fever and unexpected weight change. Respiratory:  Negative for cough, shortness of breath and wheezing. Cardiovascular:  Negative for chest pain, palpitations and leg swelling. Gastrointestinal:  Negative for abdominal pain. Neurological:  Negative for dizziness, syncope and headaches. Prior to Visit Medications    Medication Sig Taking?  Authorizing Provider   atorvastatin (LIPITOR) 10 MG tablet TAKE 1 TABLET BY MOUTH EVERY DAY  Patient not taking: Reported on 2023  Ev Barr MD   metoprolol succinate (TOPROL XL) 25 MG extended release tablet TAKE 1 TABLET BY MOUTH EVERY DAY  Patient not taking: Reported on 2023  Ebb Flood

## 2023-09-26 DIAGNOSIS — E87.6 HYPOKALEMIA: ICD-10-CM

## 2023-09-26 LAB
ANION GAP SERPL CALCULATED.3IONS-SCNC: 11 MMOL/L (ref 3–16)
BUN SERPL-MCNC: 15 MG/DL (ref 7–20)
CALCIUM SERPL-MCNC: 8.9 MG/DL (ref 8.3–10.6)
CHLORIDE SERPL-SCNC: 104 MMOL/L (ref 99–110)
CO2 SERPL-SCNC: 27 MMOL/L (ref 21–32)
CREAT SERPL-MCNC: 1 MG/DL (ref 0.6–1.1)
GFR SERPLBLD CREATININE-BSD FMLA CKD-EPI: >60 ML/MIN/{1.73_M2}
GLUCOSE SERPL-MCNC: 149 MG/DL (ref 70–99)
POTASSIUM SERPL-SCNC: 3.5 MMOL/L (ref 3.5–5.1)
SODIUM SERPL-SCNC: 142 MMOL/L (ref 136–145)

## 2023-09-27 DIAGNOSIS — I10 ESSENTIAL HYPERTENSION: ICD-10-CM

## 2023-09-27 DIAGNOSIS — E87.6 HYPOKALEMIA: Primary | ICD-10-CM

## 2023-10-26 DIAGNOSIS — I10 ESSENTIAL HYPERTENSION: ICD-10-CM

## 2023-10-26 DIAGNOSIS — E87.6 HYPOKALEMIA: ICD-10-CM

## 2023-10-26 LAB
ANION GAP SERPL CALCULATED.3IONS-SCNC: 14 MMOL/L (ref 3–16)
BUN SERPL-MCNC: 14 MG/DL (ref 7–20)
CALCIUM SERPL-MCNC: 9.5 MG/DL (ref 8.3–10.6)
CHLORIDE SERPL-SCNC: 103 MMOL/L (ref 99–110)
CO2 SERPL-SCNC: 24 MMOL/L (ref 21–32)
CREAT SERPL-MCNC: 1 MG/DL (ref 0.6–1.1)
GFR SERPLBLD CREATININE-BSD FMLA CKD-EPI: >60 ML/MIN/{1.73_M2}
GLUCOSE SERPL-MCNC: 127 MG/DL (ref 70–99)
POTASSIUM SERPL-SCNC: 3.3 MMOL/L (ref 3.5–5.1)
SODIUM SERPL-SCNC: 141 MMOL/L (ref 136–145)

## 2023-10-27 DIAGNOSIS — E87.6 HYPOKALEMIA: ICD-10-CM

## 2023-10-30 RX ORDER — POTASSIUM CHLORIDE 1500 MG/1
TABLET, EXTENDED RELEASE ORAL
Qty: 210 TABLET | OUTPATIENT
Start: 2023-10-30

## 2023-11-23 DIAGNOSIS — I50.32 CHRONIC DIASTOLIC HEART FAILURE (HCC): ICD-10-CM

## 2023-11-23 DIAGNOSIS — I10 ESSENTIAL HYPERTENSION: ICD-10-CM

## 2023-11-23 DIAGNOSIS — E87.6 HYPOKALEMIA: ICD-10-CM

## 2023-11-24 DIAGNOSIS — E87.6 HYPOKALEMIA: Primary | ICD-10-CM

## 2023-11-24 NOTE — TELEPHONE ENCOUNTER
Pt had a no show this month , has low potassium. Will order renal  Needs labs and a follow up appt sched.  If not completely out of med , let's get labs first before refilling

## 2023-11-24 NOTE — TELEPHONE ENCOUNTER
LVM for patient to call office about refills.  Labs have been ordered to be drawn and needs a f/u appointment.

## 2023-11-27 DIAGNOSIS — I50.32 CHRONIC DIASTOLIC HEART FAILURE (HCC): ICD-10-CM

## 2023-11-27 DIAGNOSIS — E87.6 HYPOKALEMIA: ICD-10-CM

## 2023-11-27 RX ORDER — ATORVASTATIN CALCIUM 10 MG/1
10 TABLET, FILM COATED ORAL DAILY
Qty: 30 TABLET | Refills: 0 | OUTPATIENT
Start: 2023-11-27

## 2023-11-27 RX ORDER — POTASSIUM CHLORIDE 1500 MG/1
TABLET, EXTENDED RELEASE ORAL
Qty: 210 TABLET | OUTPATIENT
Start: 2023-11-27

## 2023-11-27 RX ORDER — POTASSIUM CHLORIDE 1500 MG/1
20 TABLET, EXTENDED RELEASE ORAL 2 TIMES DAILY
Qty: 60 TABLET | Refills: 0 | OUTPATIENT
Start: 2023-11-27

## 2023-11-27 RX ORDER — METOPROLOL SUCCINATE 25 MG/1
25 TABLET, EXTENDED RELEASE ORAL DAILY
Qty: 30 TABLET | Refills: 0 | OUTPATIENT
Start: 2023-11-27

## 2023-11-27 RX ORDER — FUROSEMIDE 40 MG/1
40 TABLET ORAL DAILY
Qty: 30 TABLET | Refills: 0 | OUTPATIENT
Start: 2023-11-27

## 2023-11-27 RX ORDER — LISINOPRIL 5 MG/1
5 TABLET ORAL DAILY
Qty: 30 TABLET | Refills: 0 | OUTPATIENT
Start: 2023-11-27

## 2023-11-27 NOTE — TELEPHONE ENCOUNTER
Noted. Patient no showed her last appt with me. I do not know if she has been taking her medications consistently. She needs to have consistent f/u.

## 2023-11-27 NOTE — TELEPHONE ENCOUNTER
Patient no showed her last appt with me. I do not know if she has been taking her medications consistently. She needs to have consistent f/u.

## 2023-11-28 DIAGNOSIS — I50.32 CHRONIC DIASTOLIC HEART FAILURE (HCC): ICD-10-CM

## 2023-11-28 DIAGNOSIS — I10 ESSENTIAL HYPERTENSION: ICD-10-CM

## 2023-11-28 RX ORDER — FUROSEMIDE 40 MG/1
40 TABLET ORAL DAILY
Qty: 30 TABLET | Refills: 0 | Status: CANCELLED | OUTPATIENT
Start: 2023-11-28

## 2023-11-30 DIAGNOSIS — I10 ESSENTIAL HYPERTENSION: ICD-10-CM

## 2023-11-30 DIAGNOSIS — E87.6 HYPOKALEMIA: ICD-10-CM

## 2023-11-30 DIAGNOSIS — I50.32 CHRONIC DIASTOLIC HEART FAILURE (HCC): ICD-10-CM

## 2023-12-01 RX ORDER — POTASSIUM CHLORIDE 20 MEQ/1
20 TABLET, EXTENDED RELEASE ORAL 2 TIMES DAILY
Qty: 60 TABLET | Refills: 0 | Status: SHIPPED | OUTPATIENT
Start: 2023-12-01

## 2023-12-01 RX ORDER — ATORVASTATIN CALCIUM 10 MG/1
10 TABLET, FILM COATED ORAL DAILY
Qty: 30 TABLET | Refills: 0 | Status: SHIPPED | OUTPATIENT
Start: 2023-12-01

## 2023-12-01 RX ORDER — FUROSEMIDE 40 MG/1
40 TABLET ORAL DAILY
Qty: 30 TABLET | Refills: 0 | Status: SHIPPED | OUTPATIENT
Start: 2023-12-01

## 2023-12-01 RX ORDER — METOPROLOL SUCCINATE 25 MG/1
25 TABLET, EXTENDED RELEASE ORAL DAILY
Qty: 30 TABLET | Refills: 0 | Status: SHIPPED | OUTPATIENT
Start: 2023-12-01 | End: 2023-12-28

## 2023-12-01 RX ORDER — LISINOPRIL 5 MG/1
5 TABLET ORAL DAILY
Qty: 30 TABLET | Refills: 0 | Status: SHIPPED | OUTPATIENT
Start: 2023-12-01 | End: 2024-01-10 | Stop reason: SDUPTHER

## 2023-12-01 NOTE — TELEPHONE ENCOUNTER
LVM for patient to call office for sooner appointment and to get labs completed before refills can be sent.  Waiting for call back.

## 2023-12-01 NOTE — TELEPHONE ENCOUNTER
LVM that refills had been sent to pharmacy and to have labs drawn 1 week after restarting meds.  She also needs to make sure she keeps her upcoming appointment.

## 2023-12-01 NOTE — TELEPHONE ENCOUNTER
Medications refilled. She needs to have BMP drawn when she has been taking her medications for a week. She needs to keep her upcoming appt with me. She no showed her last appt.

## 2023-12-13 RX ORDER — ATORVASTATIN CALCIUM 10 MG/1
10 TABLET, FILM COATED ORAL DAILY
Qty: 30 TABLET | Refills: 0 | OUTPATIENT
Start: 2023-12-13

## 2023-12-19 RX ORDER — METOPROLOL SUCCINATE 25 MG/1
25 TABLET, EXTENDED RELEASE ORAL DAILY
Qty: 30 TABLET | Refills: 0 | OUTPATIENT
Start: 2023-12-19

## 2023-12-19 RX ORDER — ATORVASTATIN CALCIUM 10 MG/1
10 TABLET, FILM COATED ORAL DAILY
Qty: 30 TABLET | Refills: 0 | OUTPATIENT
Start: 2023-12-19

## 2023-12-19 RX ORDER — LISINOPRIL 5 MG/1
5 TABLET ORAL DAILY
Qty: 30 TABLET | Refills: 0 | OUTPATIENT
Start: 2023-12-19

## 2023-12-28 DIAGNOSIS — I50.32 CHRONIC DIASTOLIC HEART FAILURE (HCC): ICD-10-CM

## 2023-12-28 DIAGNOSIS — I10 ESSENTIAL HYPERTENSION: ICD-10-CM

## 2023-12-28 RX ORDER — METOPROLOL SUCCINATE 25 MG/1
25 TABLET, EXTENDED RELEASE ORAL DAILY
Qty: 30 TABLET | Refills: 0 | Status: SHIPPED | OUTPATIENT
Start: 2023-12-28

## 2023-12-28 RX ORDER — LISINOPRIL 5 MG/1
5 TABLET ORAL DAILY
Qty: 30 TABLET | Refills: 0 | OUTPATIENT
Start: 2023-12-28

## 2023-12-28 RX ORDER — ATORVASTATIN CALCIUM 10 MG/1
10 TABLET, FILM COATED ORAL DAILY
Qty: 30 TABLET | Refills: 0 | OUTPATIENT
Start: 2023-12-28

## 2023-12-29 RX ORDER — ATORVASTATIN CALCIUM 10 MG/1
10 TABLET, FILM COATED ORAL DAILY
Qty: 30 TABLET | Refills: 0 | OUTPATIENT
Start: 2023-12-29

## 2023-12-29 RX ORDER — LISINOPRIL 5 MG/1
5 TABLET ORAL DAILY
Qty: 30 TABLET | Refills: 0 | OUTPATIENT
Start: 2023-12-29

## 2023-12-29 NOTE — TELEPHONE ENCOUNTER
Called pt  Lmom to call back to schedule  In other refill attempts in last two months we have attempted to reach pt multiple times.     Closing Encounter

## 2024-01-10 DIAGNOSIS — I10 ESSENTIAL HYPERTENSION: ICD-10-CM

## 2024-01-10 DIAGNOSIS — I50.32 CHRONIC DIASTOLIC HEART FAILURE (HCC): ICD-10-CM

## 2024-01-17 DIAGNOSIS — I50.32 CHRONIC DIASTOLIC HEART FAILURE (HCC): ICD-10-CM

## 2024-01-17 DIAGNOSIS — I10 ESSENTIAL HYPERTENSION: ICD-10-CM

## 2024-01-17 RX ORDER — ATORVASTATIN CALCIUM 10 MG/1
10 TABLET, FILM COATED ORAL DAILY
Qty: 30 TABLET | Refills: 0 | OUTPATIENT
Start: 2024-01-17

## 2024-01-17 RX ORDER — LISINOPRIL 5 MG/1
5 TABLET ORAL DAILY
Qty: 30 TABLET | Refills: 0 | Status: SHIPPED | OUTPATIENT
Start: 2024-01-17

## 2024-01-18 ENCOUNTER — TELEPHONE (OUTPATIENT)
Dept: FAMILY MEDICINE CLINIC | Age: 59
End: 2024-01-18

## 2024-01-18 RX ORDER — ATORVASTATIN CALCIUM 10 MG/1
10 TABLET, FILM COATED ORAL DAILY
Qty: 30 TABLET | Refills: 0 | OUTPATIENT
Start: 2024-01-18

## 2024-01-18 NOTE — TELEPHONE ENCOUNTER
1/18/24  6:05 PM  Note     No lab results yet. Will refuse refill to get patient attention but please call them again.           MS    1/18/24  2:33 PM  Sadia Olivarez MA routed this conversation to Me      January 11, 2024  Garry Levin MA         1/11/24  3:18 PM  Note     LVM that labs need to be completed before refills can be sent.          January 10, 2024

## 2024-01-25 DIAGNOSIS — I50.32 CHRONIC DIASTOLIC HEART FAILURE (HCC): ICD-10-CM

## 2024-01-25 DIAGNOSIS — I10 ESSENTIAL HYPERTENSION: ICD-10-CM

## 2024-01-25 RX ORDER — ATORVASTATIN CALCIUM 10 MG/1
10 TABLET, FILM COATED ORAL DAILY
Qty: 30 TABLET | Refills: 0 | OUTPATIENT
Start: 2024-01-25

## 2024-01-25 RX ORDER — LISINOPRIL 5 MG/1
5 TABLET ORAL DAILY
Qty: 30 TABLET | Refills: 0 | OUTPATIENT
Start: 2024-01-25

## 2024-01-25 RX ORDER — METOPROLOL SUCCINATE 25 MG/1
25 TABLET, EXTENDED RELEASE ORAL DAILY
Qty: 30 TABLET | Refills: 0 | OUTPATIENT
Start: 2024-01-25

## 2024-01-30 DIAGNOSIS — I50.32 CHRONIC DIASTOLIC HEART FAILURE (HCC): ICD-10-CM

## 2024-01-30 DIAGNOSIS — I10 ESSENTIAL HYPERTENSION: ICD-10-CM

## 2024-01-31 ENCOUNTER — TELEPHONE (OUTPATIENT)
Dept: PSYCHIATRY | Age: 59
End: 2024-01-31

## 2024-01-31 RX ORDER — METOPROLOL SUCCINATE 25 MG/1
25 TABLET, EXTENDED RELEASE ORAL DAILY
Qty: 30 TABLET | Refills: 0 | OUTPATIENT
Start: 2024-01-31

## 2024-01-31 RX ORDER — ATORVASTATIN CALCIUM 10 MG/1
10 TABLET, FILM COATED ORAL DAILY
Qty: 30 TABLET | Refills: 0 | OUTPATIENT
Start: 2024-01-31

## 2024-01-31 RX ORDER — LISINOPRIL 5 MG/1
5 TABLET ORAL DAILY
Qty: 30 TABLET | Refills: 0 | OUTPATIENT
Start: 2024-01-31

## 2024-01-31 NOTE — TELEPHONE ENCOUNTER
Patient has bee discharged from practice for non-compliance.  LVM for patient to get previously ordered labs completed drawn before meds can be refilled

## 2024-01-31 NOTE — TELEPHONE ENCOUNTER
Patient has bee discharged from practice for non-compliance.  LVM for patient to get previously ordered labs completed drawn before meds can be refilled.

## 2024-01-31 NOTE — TELEPHONE ENCOUNTER
Patient has requested refills multiple times and refills have been refused due to outstanding labs that needs to be completed first.  Multiple messages have been left to get labs drawn before refills can be sent.  Patient has been discharged from practice due to non-compliance.

## 2024-02-10 DIAGNOSIS — I50.32 CHRONIC DIASTOLIC HEART FAILURE (HCC): ICD-10-CM

## 2024-02-10 DIAGNOSIS — I10 ESSENTIAL HYPERTENSION: ICD-10-CM

## 2024-02-10 RX ORDER — LISINOPRIL 5 MG/1
5 TABLET ORAL DAILY
Qty: 30 TABLET | Refills: 0 | OUTPATIENT
Start: 2024-02-10

## 2024-02-20 DIAGNOSIS — I50.32 CHRONIC DIASTOLIC HEART FAILURE (HCC): ICD-10-CM

## 2024-02-20 RX ORDER — METOPROLOL SUCCINATE 25 MG/1
25 TABLET, EXTENDED RELEASE ORAL DAILY
Qty: 30 TABLET | Refills: 0 | OUTPATIENT
Start: 2024-02-20

## 2024-02-21 DIAGNOSIS — E87.6 HYPOKALEMIA: ICD-10-CM

## 2024-02-21 DIAGNOSIS — I50.32 CHRONIC DIASTOLIC HEART FAILURE (HCC): ICD-10-CM

## 2024-02-21 DIAGNOSIS — I10 ESSENTIAL HYPERTENSION: ICD-10-CM

## 2024-02-21 LAB
ALBUMIN SERPL-MCNC: 4.3 G/DL (ref 3.4–5)
ANION GAP SERPL CALCULATED.3IONS-SCNC: 12 MMOL/L (ref 3–16)
BUN SERPL-MCNC: 17 MG/DL (ref 7–20)
CALCIUM SERPL-MCNC: 9.4 MG/DL (ref 8.3–10.6)
CHLORIDE SERPL-SCNC: 103 MMOL/L (ref 99–110)
CO2 SERPL-SCNC: 28 MMOL/L (ref 21–32)
CREAT SERPL-MCNC: 0.9 MG/DL (ref 0.6–1.1)
GFR SERPLBLD CREATININE-BSD FMLA CKD-EPI: >60 ML/MIN/{1.73_M2}
GLUCOSE SERPL-MCNC: 185 MG/DL (ref 70–99)
PHOSPHATE SERPL-MCNC: 2.9 MG/DL (ref 2.5–4.9)
POTASSIUM SERPL-SCNC: 3.5 MMOL/L (ref 3.5–5.1)
SODIUM SERPL-SCNC: 143 MMOL/L (ref 136–145)

## 2024-02-21 RX ORDER — ATORVASTATIN CALCIUM 10 MG/1
10 TABLET, FILM COATED ORAL DAILY
Qty: 30 TABLET | Refills: 0 | OUTPATIENT
Start: 2024-02-21

## 2024-02-21 RX ORDER — METOPROLOL SUCCINATE 25 MG/1
25 TABLET, EXTENDED RELEASE ORAL DAILY
Qty: 30 TABLET | Refills: 0 | OUTPATIENT
Start: 2024-02-21

## 2024-02-21 RX ORDER — LISINOPRIL 5 MG/1
5 TABLET ORAL DAILY
Qty: 30 TABLET | Refills: 0 | OUTPATIENT
Start: 2024-02-21

## 2024-02-23 RX ORDER — ATORVASTATIN CALCIUM 10 MG/1
10 TABLET, FILM COATED ORAL DAILY
Qty: 30 TABLET | Refills: 0 | OUTPATIENT
Start: 2024-02-23

## 2024-02-25 DIAGNOSIS — I50.32 CHRONIC DIASTOLIC HEART FAILURE (HCC): ICD-10-CM

## 2024-02-25 DIAGNOSIS — I10 ESSENTIAL HYPERTENSION: ICD-10-CM

## 2024-02-26 RX ORDER — METOPROLOL SUCCINATE 25 MG/1
25 TABLET, EXTENDED RELEASE ORAL DAILY
Qty: 30 TABLET | Refills: 0 | OUTPATIENT
Start: 2024-02-26

## 2024-02-26 RX ORDER — ATORVASTATIN CALCIUM 10 MG/1
10 TABLET, FILM COATED ORAL DAILY
Qty: 30 TABLET | Refills: 0 | OUTPATIENT
Start: 2024-02-26

## 2024-02-26 RX ORDER — LISINOPRIL 5 MG/1
5 TABLET ORAL DAILY
Qty: 30 TABLET | Refills: 0 | OUTPATIENT
Start: 2024-02-26

## 2024-02-27 DIAGNOSIS — I50.32 CHRONIC DIASTOLIC HEART FAILURE (HCC): ICD-10-CM

## 2024-02-27 DIAGNOSIS — I10 ESSENTIAL HYPERTENSION: ICD-10-CM

## 2024-02-27 RX ORDER — LISINOPRIL 5 MG/1
5 TABLET ORAL DAILY
Qty: 30 TABLET | Refills: 0 | OUTPATIENT
Start: 2024-02-27

## 2024-03-02 DIAGNOSIS — I10 ESSENTIAL HYPERTENSION: ICD-10-CM

## 2024-03-02 DIAGNOSIS — I50.32 CHRONIC DIASTOLIC HEART FAILURE (HCC): ICD-10-CM

## 2024-03-02 RX ORDER — ATORVASTATIN CALCIUM 10 MG/1
10 TABLET, FILM COATED ORAL DAILY
Qty: 30 TABLET | Refills: 0 | Status: CANCELLED | OUTPATIENT
Start: 2024-03-02

## 2024-03-02 RX ORDER — METOPROLOL SUCCINATE 25 MG/1
25 TABLET, EXTENDED RELEASE ORAL DAILY
Qty: 30 TABLET | Refills: 0 | Status: CANCELLED | OUTPATIENT
Start: 2024-03-02

## 2024-03-04 ENCOUNTER — PATIENT MESSAGE (OUTPATIENT)
Dept: CARDIOLOGY CLINIC | Age: 59
End: 2024-03-04

## 2024-03-04 DIAGNOSIS — I10 ESSENTIAL HYPERTENSION: ICD-10-CM

## 2024-03-04 DIAGNOSIS — I50.32 CHRONIC DIASTOLIC HEART FAILURE (HCC): ICD-10-CM

## 2024-03-04 RX ORDER — LISINOPRIL 5 MG/1
5 TABLET ORAL DAILY
Qty: 30 TABLET | Refills: 0 | OUTPATIENT
Start: 2024-03-04

## 2024-03-04 NOTE — TELEPHONE ENCOUNTER
LVM explaining that I had called last week and lvm that meds could not be refilled because she had been discharged from the practice.  Labs were not completed for several months after trying to reach her to get them done.  Suggested she contact her cardiologist for the refills.  She should call if she has any questions.

## 2024-03-05 RX ORDER — ATORVASTATIN CALCIUM 10 MG/1
10 TABLET, FILM COATED ORAL DAILY
Qty: 30 TABLET | Refills: 0 | Status: SHIPPED | OUTPATIENT
Start: 2024-03-05

## 2024-03-05 RX ORDER — METOPROLOL SUCCINATE 25 MG/1
25 TABLET, EXTENDED RELEASE ORAL DAILY
Qty: 30 TABLET | Refills: 0 | Status: SHIPPED | OUTPATIENT
Start: 2024-03-05

## 2024-03-05 RX ORDER — LISINOPRIL 5 MG/1
5 TABLET ORAL DAILY
Qty: 30 TABLET | Refills: 0 | Status: SHIPPED | OUTPATIENT
Start: 2024-03-05

## 2024-03-05 NOTE — TELEPHONE ENCOUNTER
From: Danielito Morris  To: Dr. Konrad Rudolph  Sent: 3/4/2024 6:01 PM EST  Subject: Refills    This Danielito morris wanted to see if dr stevens can refill my medicine lisinpril 5 mg metroprolol 25 mg and Larry can 10mg my dr Uribe office said to ask dr stevens to refill since I no longer see Rony let me no thanks

## 2024-03-28 DIAGNOSIS — E78.5 HYPERLIPIDEMIA, UNSPECIFIED HYPERLIPIDEMIA TYPE: Primary | ICD-10-CM

## 2024-03-28 DIAGNOSIS — I10 ESSENTIAL HYPERTENSION: ICD-10-CM

## 2024-03-28 DIAGNOSIS — I50.32 CHRONIC DIASTOLIC HEART FAILURE (HCC): ICD-10-CM

## 2024-03-28 RX ORDER — METOPROLOL SUCCINATE 25 MG/1
25 TABLET, EXTENDED RELEASE ORAL DAILY
Qty: 90 TABLET | Refills: 3 | Status: SHIPPED | OUTPATIENT
Start: 2024-03-28

## 2024-03-28 RX ORDER — LISINOPRIL 5 MG/1
5 TABLET ORAL DAILY
Qty: 90 TABLET | Refills: 3 | Status: SHIPPED | OUTPATIENT
Start: 2024-03-28

## 2024-03-28 RX ORDER — ATORVASTATIN CALCIUM 10 MG/1
10 TABLET, FILM COATED ORAL DAILY
Qty: 90 TABLET | Refills: 3 | Status: SHIPPED | OUTPATIENT
Start: 2024-03-28 | End: 2024-03-28 | Stop reason: SDUPTHER

## 2024-03-28 RX ORDER — LISINOPRIL 5 MG/1
5 TABLET ORAL DAILY
Qty: 90 TABLET | Refills: 3 | Status: SHIPPED | OUTPATIENT
Start: 2024-03-28 | End: 2024-03-28 | Stop reason: SDUPTHER

## 2024-03-28 RX ORDER — ATORVASTATIN CALCIUM 10 MG/1
10 TABLET, FILM COATED ORAL DAILY
Qty: 90 TABLET | Refills: 3 | Status: SHIPPED | OUTPATIENT
Start: 2024-03-28

## 2024-03-28 RX ORDER — METOPROLOL SUCCINATE 25 MG/1
25 TABLET, EXTENDED RELEASE ORAL DAILY
Qty: 90 TABLET | Refills: 3 | Status: SHIPPED | OUTPATIENT
Start: 2024-03-28 | End: 2024-03-28 | Stop reason: SDUPTHER

## 2025-01-20 DIAGNOSIS — I50.32 CHRONIC DIASTOLIC HEART FAILURE (HCC): ICD-10-CM

## 2025-01-20 DIAGNOSIS — E78.5 HYPERLIPIDEMIA, UNSPECIFIED HYPERLIPIDEMIA TYPE: ICD-10-CM

## 2025-01-20 DIAGNOSIS — I10 ESSENTIAL HYPERTENSION: ICD-10-CM

## 2025-01-20 RX ORDER — ATORVASTATIN CALCIUM 10 MG/1
10 TABLET, FILM COATED ORAL DAILY
Qty: 90 TABLET | Refills: 3 | OUTPATIENT
Start: 2025-01-20

## 2025-01-20 RX ORDER — LISINOPRIL 5 MG/1
5 TABLET ORAL DAILY
Qty: 90 TABLET | Refills: 3 | OUTPATIENT
Start: 2025-01-20

## 2025-01-20 RX ORDER — METOPROLOL SUCCINATE 25 MG/1
25 TABLET, EXTENDED RELEASE ORAL DAILY
Qty: 90 TABLET | Refills: 3 | OUTPATIENT
Start: 2025-01-20

## 2025-03-25 ENCOUNTER — TELEPHONE (OUTPATIENT)
Dept: CARDIOLOGY CLINIC | Age: 60
End: 2025-03-25

## 2025-03-25 DIAGNOSIS — I10 ESSENTIAL HYPERTENSION: ICD-10-CM

## 2025-03-25 DIAGNOSIS — I50.32 CHRONIC DIASTOLIC HEART FAILURE (HCC): ICD-10-CM

## 2025-03-25 RX ORDER — LISINOPRIL 5 MG/1
5 TABLET ORAL DAILY
Qty: 90 TABLET | Refills: 3 | OUTPATIENT
Start: 2025-03-25

## 2025-03-25 NOTE — TELEPHONE ENCOUNTER
Patient is significantly overdue for follow up with Dr. Rudolph. Was last seen in 2022, and was to follow up- in 6 months. She will need to schedule a follow up for any further refills, please call her to schedule. Thanks.

## 2025-03-27 NOTE — TELEPHONE ENCOUNTER
Patient states that she is waiting on insurance with employer. Wanted to schedule f/u out.    Scheduled for 7/8 at 4 p.m.

## 2025-03-31 NOTE — PROGRESS NOTES
Discharge order noted. [facility] has received over 60 minutes of education on heart failure. She has  a follow up appointment arranged  With Dr. Ngozi Gilliam on 5/24/22 at 915am.  She was given a new scale yesterday, and she has agreed to weigh herself each morning and write it down on the weight log.   She has discharge instructions on her AVS.  Her crow today is 209 lbs [PMH Reviewed and Updated] : past medical history reviewed and updated

## 2025-06-15 DIAGNOSIS — E78.5 HYPERLIPIDEMIA, UNSPECIFIED HYPERLIPIDEMIA TYPE: ICD-10-CM

## 2025-06-15 DIAGNOSIS — I10 ESSENTIAL HYPERTENSION: Primary | ICD-10-CM

## 2025-06-15 DIAGNOSIS — I50.32 CHRONIC DIASTOLIC HEART FAILURE (HCC): ICD-10-CM

## 2025-06-16 DIAGNOSIS — I10 ESSENTIAL HYPERTENSION: ICD-10-CM

## 2025-06-16 DIAGNOSIS — I50.32 CHRONIC DIASTOLIC HEART FAILURE (HCC): ICD-10-CM

## 2025-06-16 RX ORDER — ATORVASTATIN CALCIUM 10 MG/1
10 TABLET, FILM COATED ORAL DAILY
Qty: 30 TABLET | Refills: 0 | Status: SHIPPED | OUTPATIENT
Start: 2025-06-16

## 2025-06-16 NOTE — TELEPHONE ENCOUNTER
Last OV: 12/30/22  Next OV: 7/8/25  Last refill: 3/28/24 #90 3 R/F  Most recent Labs:   Last EKG (if needed):    Called spoke with patient. States she will have new insurance. Starts 7/1/25. Will be able to get labs then.   Labs ordered.

## 2025-06-16 NOTE — TELEPHONE ENCOUNTER
Last OV: 12/30/22  Next OV: 7/8/25  Last refill: 3/28/24 #90 3 R/F  Most recent Labs:   Last EKG (if needed):    Called spoke with patient. States she will have new insurance. Starts 7/1/25. Will be able to get labs then.   Labs ordered

## 2025-06-17 RX ORDER — LISINOPRIL 5 MG/1
5 TABLET ORAL DAILY
Qty: 30 TABLET | Refills: 0 | Status: SHIPPED | OUTPATIENT
Start: 2025-06-17

## 2025-06-17 RX ORDER — METOPROLOL SUCCINATE 25 MG/1
25 TABLET, EXTENDED RELEASE ORAL DAILY
Qty: 30 TABLET | Refills: 0 | Status: SHIPPED | OUTPATIENT
Start: 2025-06-17

## (undated) DEVICE — COVER TRNSDUC W6XL96IN POLY TELESCOPICALLY FLD W/ ATTCH FRM

## (undated) DEVICE — BLADE ES ELASTOMERIC COAT INSUL DURABLE BEND UPTO 90DEG

## (undated) DEVICE — GAUZE FLUFF 2 PLY: Brand: DEROYAL

## (undated) DEVICE — GLOVE ORANGE PI 7   MSG9070

## (undated) DEVICE — RETRACTOR SURG NARROW FLAT ULT LTWT ELEV TIP LO PROF

## (undated) DEVICE — CONTAINER SPEC 165OZ POLYPR PATH SNAP LOK CAP W/ LID

## (undated) DEVICE — PAD,ABDOMINAL,8"X7.5",STERILE,LF,1/PK: Brand: MEDLINE

## (undated) DEVICE — APPLIER LIG CLP M L11IN TI STR RNG HNDL FOR 20 CLP DISP

## (undated) DEVICE — SHEARS ENDOSCP L9CM CRV HARM FOCS +

## (undated) DEVICE — SUTURE PERMAHAND SZ 2-0 L30IN NONABSORBABLE BLK SILK W/O A305H

## (undated) DEVICE — TUBING, SUCTION, 1/4" X 12', STRAIGHT: Brand: MEDLINE

## (undated) DEVICE — GARMENT COMPR STD FOR 17IN CALF UNIF THER FLOTRN

## (undated) DEVICE — ELECTRODE PT RET AD L9FT HI MOIST COND ADH HYDRGEL CORDED

## (undated) DEVICE — SYSTEM PMP VAC SYR 10CC CONT FLO SGL ACT 1 W VLV SAPS

## (undated) DEVICE — MERCY HEALTH WEST TURNOVER: Brand: MEDLINE INDUSTRIES, INC.

## (undated) DEVICE — DRAPE,T,LAPARO,TRANS,STERILE: Brand: MEDLINE

## (undated) DEVICE — DRAPE,CHEST,FENES,15X10,STERIL: Brand: MEDLINE

## (undated) DEVICE — SYRINGE MED 10ML LUERLOCK TIP W/O SFTY DISP

## (undated) DEVICE — COVER LT HNDL BLU PLAS

## (undated) DEVICE — SOLUTION IV IRRIG POUR BRL 0.9% SODIUM CHL 2F7124

## (undated) DEVICE — NEEDLE HYPO 25GA L1.5IN BVL ORIENTED ECLIPSE

## (undated) DEVICE — SUTURE MCRYL + SZ 4-0 L18IN ABSRB UD L19MM PS-2 3/8 CIR MCP496G

## (undated) DEVICE — APPLICATOR MEDICATED 26 CC SOLUTION HI LT ORNG CHLORAPREP

## (undated) DEVICE — CANISTER, RIGID, 1200CC: Brand: MEDLINE INDUSTRIES, INC.

## (undated) DEVICE — SUTURE PERMA-HAND SZ 2-0 L30IN NONABSORBABLE BLK L26MM SH K833H

## (undated) DEVICE — 3M™ TEGADERM™ TRANSPARENT FILM DRESSING FRAME STYLE, 1626W, 4 IN X 4-3/4 IN (10 CM X 12 CM), 50/CT 4CT/CASE: Brand: 3M™ TEGADERM™

## (undated) DEVICE — 3M™ IOBAN™ 2 ANTIMICROBIAL INCISE DRAPE 6650EZ: Brand: IOBAN™ 2

## (undated) DEVICE — DRAIN SURG 15FR SIL RND CHN W/ TRCR FULL FLUT DBL WRP TRAD

## (undated) DEVICE — SUTURE PERMAHAND SZ 2-0 L18IN NONABSORBABLE BLK L26MM FS 685G

## (undated) DEVICE — SUTURE ABSORBABLE MONOFILAMENT 3-0 SH 27 IN VIO PDS + PDP316H

## (undated) DEVICE — SYRINGE 20ML LL S/C 50

## (undated) DEVICE — RETRACTOR SURG WIDE FLAT LO PROF LTWT PHOTONGUIDE

## (undated) DEVICE — DECANTER BAG 9": Brand: MEDLINE INDUSTRIES, INC.

## (undated) DEVICE — MAJOR SET UP: Brand: MEDLINE INDUSTRIES, INC.

## (undated) DEVICE — TOWEL,OR,DSP,ST,BLUE,STD,4/PK,20PK/CS: Brand: MEDLINE

## (undated) DEVICE — CLEANER,CAUTERY TIP,2X2",STERILE: Brand: MEDLINE

## (undated) DEVICE — Device

## (undated) DEVICE — ATTACHMENT SMK EVAC FOR ES PNCL ACCUVAC

## (undated) DEVICE — SUTURE VCRL + SZ 3-0 L18IN ABSRB UD SH 1/2 CIR TAPERCUT NDL VCP864D

## (undated) DEVICE — RESERVOIR,SUCTION,100CC,SILICONE: Brand: MEDLINE

## (undated) DEVICE — TOTAL TRAY, 16FR 10ML SIL FOLEY, URN: Brand: MEDLINE

## (undated) DEVICE — SPONGE LAP W18XL18IN WHT COT 4 PLY FLD STRUNG RADPQ DISP ST